# Patient Record
Sex: FEMALE | Race: WHITE | Employment: OTHER | URBAN - METROPOLITAN AREA
[De-identification: names, ages, dates, MRNs, and addresses within clinical notes are randomized per-mention and may not be internally consistent; named-entity substitution may affect disease eponyms.]

---

## 2017-04-21 ENCOUNTER — OP HISTORICAL/CONVERTED ENCOUNTER (OUTPATIENT)
Dept: OTHER | Age: 72
End: 2017-04-21

## 2018-04-27 ENCOUNTER — OP HISTORICAL/CONVERTED ENCOUNTER (OUTPATIENT)
Dept: OTHER | Age: 73
End: 2018-04-27

## 2019-01-04 LAB — AMB DEXA, EXTERNAL: NORMAL

## 2019-05-03 ENCOUNTER — OP HISTORICAL/CONVERTED ENCOUNTER (OUTPATIENT)
Dept: OTHER | Age: 74
End: 2019-05-03

## 2019-10-04 LAB
CREATININE, EXTERNAL: 0.67
HBA1C MFR BLD HPLC: NORMAL %
LDL-C, EXTERNAL: 111

## 2020-05-13 ENCOUNTER — OP HISTORICAL/CONVERTED ENCOUNTER (OUTPATIENT)
Dept: OTHER | Age: 75
End: 2020-05-13

## 2020-05-13 LAB — MAMMOGRAPHY, EXTERNAL: NORMAL

## 2020-10-04 PROBLEM — M85.80 OSTEOPENIA: Status: ACTIVE | Noted: 2020-10-04

## 2020-10-04 PROBLEM — E78.2 MIXED HYPERLIPIDEMIA: Status: ACTIVE | Noted: 2020-10-04

## 2020-10-04 PROBLEM — M19.90 ARTHRITIS: Status: ACTIVE | Noted: 2020-10-04

## 2020-10-04 PROBLEM — M11.20 CHONDROCALCINOSIS DUE TO DICALCIUM PHOSPHATE CRYSTALS: Status: ACTIVE | Noted: 2020-10-04

## 2020-10-04 RX ORDER — AMLODIPINE BESYLATE AND BENAZEPRIL HYDROCHLORIDE 10; 40 MG/1; MG/1
1 CAPSULE ORAL DAILY
COMMUNITY

## 2020-10-04 RX ORDER — NYSTATIN 100000 [USP'U]/G
POWDER TOPICAL 4 TIMES DAILY
COMMUNITY
End: 2022-07-07 | Stop reason: ALTCHOICE

## 2020-10-04 RX ORDER — METOPROLOL SUCCINATE 100 MG/1
100 TABLET, EXTENDED RELEASE ORAL DAILY
COMMUNITY

## 2020-10-04 RX ORDER — PRAVASTATIN SODIUM 20 MG/1
20 TABLET ORAL
COMMUNITY

## 2020-10-04 RX ORDER — VALACYCLOVIR HYDROCHLORIDE 1 G/1
TABLET, FILM COATED ORAL
COMMUNITY
End: 2021-05-13 | Stop reason: ALTCHOICE

## 2020-10-04 RX ORDER — DICLOFENAC SODIUM 75 MG/1
TABLET, DELAYED RELEASE ORAL
COMMUNITY
End: 2020-10-16

## 2020-10-04 RX ORDER — PERPHENAZINE/AMITRIPTYLINE HCL 2 MG-10 MG
TABLET ORAL
COMMUNITY

## 2020-10-16 RX ORDER — DICLOFENAC SODIUM 75 MG/1
TABLET, DELAYED RELEASE ORAL
Qty: 180 TAB | Refills: 0 | Status: SHIPPED | OUTPATIENT
Start: 2020-10-16 | End: 2021-01-11

## 2020-11-02 VITALS
BODY MASS INDEX: 23.56 KG/M2 | HEART RATE: 66 BPM | HEIGHT: 64 IN | OXYGEN SATURATION: 97 % | SYSTOLIC BLOOD PRESSURE: 180 MMHG | TEMPERATURE: 98.5 F | DIASTOLIC BLOOD PRESSURE: 80 MMHG | WEIGHT: 138 LBS | RESPIRATION RATE: 12 BRPM

## 2020-11-09 ENCOUNTER — OFFICE VISIT (OUTPATIENT)
Dept: FAMILY MEDICINE CLINIC | Age: 75
End: 2020-11-09
Payer: MEDICARE

## 2020-11-09 VITALS
TEMPERATURE: 97.5 F | DIASTOLIC BLOOD PRESSURE: 70 MMHG | SYSTOLIC BLOOD PRESSURE: 120 MMHG | HEART RATE: 65 BPM | OXYGEN SATURATION: 98 % | WEIGHT: 137 LBS | HEIGHT: 64 IN | BODY MASS INDEX: 23.39 KG/M2

## 2020-11-09 DIAGNOSIS — I10 ESSENTIAL HYPERTENSION, BENIGN: Chronic | ICD-10-CM

## 2020-11-09 DIAGNOSIS — B35.1 TOENAIL FUNGUS: ICD-10-CM

## 2020-11-09 DIAGNOSIS — R73.01 IMPAIRED FASTING GLUCOSE: Primary | ICD-10-CM

## 2020-11-09 DIAGNOSIS — M85.80 OSTEOPENIA, UNSPECIFIED LOCATION: ICD-10-CM

## 2020-11-09 DIAGNOSIS — E78.2 MIXED HYPERLIPIDEMIA: ICD-10-CM

## 2020-11-09 DIAGNOSIS — Z86.19 HISTORY OF HERPES ZOSTER: ICD-10-CM

## 2020-11-09 PROBLEM — R01.1 HEART MURMUR: Status: ACTIVE | Noted: 2020-11-09

## 2020-11-09 PROBLEM — L24.9 IRRITANT DERMATITIS: Status: ACTIVE | Noted: 2020-11-09

## 2020-11-09 PROBLEM — L82.1 SEBORRHEIC KERATOSES: Status: ACTIVE | Noted: 2020-11-09

## 2020-11-09 PROCEDURE — G8420 CALC BMI NORM PARAMETERS: HCPCS | Performed by: NURSE PRACTITIONER

## 2020-11-09 PROCEDURE — 1090F PRES/ABSN URINE INCON ASSESS: CPT | Performed by: NURSE PRACTITIONER

## 2020-11-09 PROCEDURE — G0439 PPPS, SUBSEQ VISIT: HCPCS | Performed by: NURSE PRACTITIONER

## 2020-11-09 PROCEDURE — G8752 SYS BP LESS 140: HCPCS | Performed by: NURSE PRACTITIONER

## 2020-11-09 PROCEDURE — G8536 NO DOC ELDER MAL SCRN: HCPCS | Performed by: NURSE PRACTITIONER

## 2020-11-09 PROCEDURE — 99213 OFFICE O/P EST LOW 20 MIN: CPT | Performed by: NURSE PRACTITIONER

## 2020-11-09 PROCEDURE — G8427 DOCREV CUR MEDS BY ELIG CLIN: HCPCS | Performed by: NURSE PRACTITIONER

## 2020-11-09 PROCEDURE — G8399 PT W/DXA RESULTS DOCUMENT: HCPCS | Performed by: NURSE PRACTITIONER

## 2020-11-09 PROCEDURE — G8754 DIAS BP LESS 90: HCPCS | Performed by: NURSE PRACTITIONER

## 2020-11-09 PROCEDURE — G8432 DEP SCR NOT DOC, RNG: HCPCS | Performed by: NURSE PRACTITIONER

## 2020-11-09 PROCEDURE — 1101F PT FALLS ASSESS-DOCD LE1/YR: CPT | Performed by: NURSE PRACTITIONER

## 2020-11-09 RX ORDER — SPIRONOLACTONE 25 MG/1
25 TABLET ORAL DAILY
COMMUNITY

## 2020-11-09 RX ORDER — CICLOPIROX 7.7 MG/G
GEL TOPICAL 2 TIMES DAILY
Qty: 30 G | Refills: 3 | Status: SHIPPED | OUTPATIENT
Start: 2020-11-09 | End: 2022-01-06 | Stop reason: ALTCHOICE

## 2020-11-09 RX ORDER — ZOSTER VACCINE RECOMBINANT, ADJUVANTED 50 MCG/0.5
0.5 KIT INTRAMUSCULAR ONCE
Qty: 0.5 ML | Refills: 0 | Status: SHIPPED | OUTPATIENT
Start: 2020-11-09 | End: 2020-11-09

## 2020-11-09 NOTE — PROGRESS NOTES
Medicare Wellness Exam:    Chief Complaint   Patient presents with    Annual Wellness Visit     Subsequent      she is a 67y.o. year old female who presents for evaluation for their Medicare Wellness Visit. Patient does not monitor her blood pressure at home but is seen annually by a cardiologist (Dr. Lis Mcneil) and is also followed by dermatology (Dr. Gavin Seth for her herpese zoster episode, history of epidermal cyst, irritant dermatitis of the back, seborrheic keratosis of the back and skin cancer screening). Last tdap was  2012 and she has had both pneumonia vaccines. It has been recommended to her by her dermatologist that she get the shingrix and I am sending an order to her pharmacy. She has had a total hysterectomy with oopherectomy and her last colonoscopy was in 2017 and was WNL. Actually at age 67 she has aged out of pap and colonoscopy screenings. Patient got her flu vaccine [id number] at her local pharmacy. She is also followed by Dr. Huang Phillips (opthamology ) for cataracts. She does have a current complaint of toenail fungus of left great toe    Fall Screen is completed and assessed=yes  Depression Screen is completed and assessed=yes  Medication list reviewed and adjusted for accuracy=yes  Immunizations reviewed and updated=yes  Health/Preventative Screenings reviewed and updated=yes  ADL Functions reviewed=yes  See scanned medicare wellness documents for full details.      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 ROS   ROS per HPI and patient's active problem list    Objective:     Vitals:    11/09/20 0856   BP: 120/70   Pulse: 65   Temp: 97.5 °F (36.4 °C)   TempSrc: Temporal   SpO2: 98%   Weight: 137 lb (62.1 kg)   Height: 5' 4\" (1.626 m)     Physical Exam  Vitals signs reviewed. HENT:      Head: Normocephalic. Neck:      Musculoskeletal: Normal range of motion and neck supple. Cardiovascular:      Rate and Rhythm: Normal rate and regular rhythm. Heart sounds: Normal heart sounds. Pulmonary:      Effort: Pulmonary effort is normal.      Breath sounds: Normal breath sounds. Comments: Non-radiating murmur  Abdominal:      General: Bowel sounds are normal.      Palpations: Abdomen is soft. Musculoskeletal: Normal range of motion. Skin:     General: Skin is warm and dry. Neurological:      Mental Status: She is oriented to person, place, and time. Psychiatric:         Mood and Affect: Mood normal.         Behavior: Behavior normal.         Thought Content: Thought content normal.         Judgment: Judgment normal.          Assessment/ Plan:   Diagnoses and all orders for this visit:    1. Impaired fasting glucose  -     HEMOGLOBIN A1C WITH EAG    2. Osteopenia, unspecified location    3. Mixed hyperlipidemia  -     CBC WITH AUTOMATED DIFF  -     LIPID PANEL  -     METABOLIC PANEL, COMPREHENSIVE    4. Essential hypertension, benign    5. History of herpes zoster    6. Toenail fungus    Other orders  -     varicella-zoster recombinant, PF, (Shingrix, PF,) 50 mcg/0.5 mL susr injection; 0.5 mL by IntraMUSCular route once for 1 dose. -     ciclopirox (LOPROX) 0.77 % topical gel; Apply  to affected area two (2) times a day.          -Pain evaluation performed in office  -Cognitive Screen performed in office  -Depression Screen, Fall risks (by up and go test)  and ADL functionality were addressed  -Medication list updated and reviewed for any changes   -A comprehensive review of medical issues and a plan was formulated  -End of life planning was addressed with pt   -Health Screenings for preventions were addressed and a plan was formulated  -Shingles Vaccine was recommended  -Discussed with patient cancer risk factors and appropriate screenings for age  -Patient evaluated for colonoscopy and referred if needed per screeing criteria  -Labs from previous visits were discussed with patient   -Discussed with patient diet and exercise and formulated a plan as needed  -An Advanced care plan was developed with the patient.  -Alcohol screening performed and was negative    -  Follow-up and Dispositions    · Return in about 1 year (around 11/9/2021). I have discussed the diagnosis with the patient and the intended plan as seen in the above orders. The patient understands and agrees with the plan. The patient has received an after-visit summary and questions were answered concerning future plans. Medication Side Effects and Warnings were discussed with patien  Patient Labs were reviewed and or requested  Patient Past Records were reviewed and or requested    There are no Patient Instructions on file for this visit.       Chepe Childs NP

## 2020-11-10 LAB
ALBUMIN SERPL-MCNC: 4.9 G/DL (ref 3.7–4.7)
ALBUMIN/GLOB SERPL: 2 {RATIO} (ref 1.2–2.2)
ALP SERPL-CCNC: 85 IU/L (ref 39–117)
ALT SERPL-CCNC: 22 IU/L (ref 0–32)
AST SERPL-CCNC: 18 IU/L (ref 0–40)
BASOPHILS # BLD AUTO: 0.1 X10E3/UL (ref 0–0.2)
BASOPHILS NFR BLD AUTO: 1 %
BILIRUB SERPL-MCNC: 0.4 MG/DL (ref 0–1.2)
BUN SERPL-MCNC: 21 MG/DL (ref 8–27)
BUN/CREAT SERPL: 25 (ref 12–28)
CALCIUM SERPL-MCNC: 10.1 MG/DL (ref 8.7–10.3)
CHLORIDE SERPL-SCNC: 95 MMOL/L (ref 96–106)
CHOLEST SERPL-MCNC: 188 MG/DL (ref 100–199)
CO2 SERPL-SCNC: 26 MMOL/L (ref 20–29)
CREAT SERPL-MCNC: 0.85 MG/DL (ref 0.57–1)
EOSINOPHIL # BLD AUTO: 0.1 X10E3/UL (ref 0–0.4)
EOSINOPHIL NFR BLD AUTO: 1 %
ERYTHROCYTE [DISTWIDTH] IN BLOOD BY AUTOMATED COUNT: 12.5 % (ref 11.7–15.4)
EST. AVERAGE GLUCOSE BLD GHB EST-MCNC: 126 MG/DL
GLOBULIN SER CALC-MCNC: 2.4 G/DL (ref 1.5–4.5)
GLUCOSE SERPL-MCNC: 105 MG/DL (ref 65–99)
HBA1C MFR BLD: 6 % (ref 4.8–5.6)
HCT VFR BLD AUTO: 36.9 % (ref 34–46.6)
HDLC SERPL-MCNC: 52 MG/DL
HGB BLD-MCNC: 12.3 G/DL (ref 11.1–15.9)
IMM GRANULOCYTES # BLD AUTO: 0 X10E3/UL (ref 0–0.1)
IMM GRANULOCYTES NFR BLD AUTO: 0 %
LDLC SERPL CALC-MCNC: 115 MG/DL (ref 0–99)
LYMPHOCYTES # BLD AUTO: 1.1 X10E3/UL (ref 0.7–3.1)
LYMPHOCYTES NFR BLD AUTO: 20 %
MCH RBC QN AUTO: 30.9 PG (ref 26.6–33)
MCHC RBC AUTO-ENTMCNC: 33.3 G/DL (ref 31.5–35.7)
MCV RBC AUTO: 93 FL (ref 79–97)
MONOCYTES # BLD AUTO: 0.6 X10E3/UL (ref 0.1–0.9)
MONOCYTES NFR BLD AUTO: 10 %
NEUTROPHILS # BLD AUTO: 3.8 X10E3/UL (ref 1.4–7)
NEUTROPHILS NFR BLD AUTO: 68 %
PLATELET # BLD AUTO: 218 X10E3/UL (ref 150–450)
POTASSIUM SERPL-SCNC: 4.5 MMOL/L (ref 3.5–5.2)
PROT SERPL-MCNC: 7.3 G/DL (ref 6–8.5)
RBC # BLD AUTO: 3.98 X10E6/UL (ref 3.77–5.28)
SODIUM SERPL-SCNC: 131 MMOL/L (ref 134–144)
TRIGL SERPL-MCNC: 119 MG/DL (ref 0–149)
VLDLC SERPL CALC-MCNC: 21 MG/DL (ref 5–40)
WBC # BLD AUTO: 5.6 X10E3/UL (ref 3.4–10.8)

## 2020-11-11 ENCOUNTER — TELEPHONE (OUTPATIENT)
Dept: FAMILY MEDICINE CLINIC | Age: 75
End: 2020-11-11

## 2020-11-11 NOTE — TELEPHONE ENCOUNTER
[facility] called to see where to put the gel for her tonail fungus: Left great toenail

## 2020-11-12 ENCOUNTER — TELEPHONE (OUTPATIENT)
Dept: FAMILY MEDICINE CLINIC | Age: 75
End: 2020-11-12

## 2020-11-13 NOTE — PROGRESS NOTES
In October of 2019 your A1C was WNL at 5.6. It is now in the prediabetic level at 6.0. I think that we can still handle it with lifestyle changes and recheck in 6 months. Those lifestyle changes would be:  Patient instructed to adhere to portion sizes Avoid the intake of concentrated sweets such as cakes, pies and cookies, Avoid intake of soft drinks (even diet soft drinks)  Increase intake of water, Increase intake of fiber, Lose weight and exercise at least 30 minutes most days of the week. Your bad cholesterol is also mildly elevated but relatively stable. It was 111 at your last set of labs and 115. Now. That is a minimal increase so we will monitor it for now. Continue taking your pravastatin and hopefully you are taking that in the evening. Your other labs are basically normal.  Some values may be minimally outside the \"normal\" range but are not harmful or clinically significant.   Please contact the office if you have questions or concerns

## 2020-11-16 ENCOUNTER — TELEPHONE (OUTPATIENT)
Dept: FAMILY MEDICINE CLINIC | Age: 75
End: 2020-11-16

## 2021-01-11 RX ORDER — DICLOFENAC SODIUM 75 MG/1
TABLET, DELAYED RELEASE ORAL
Qty: 180 TAB | Refills: 1 | Status: SHIPPED | OUTPATIENT
Start: 2021-01-11 | End: 2021-07-07

## 2021-04-20 ENCOUNTER — TRANSCRIBE ORDER (OUTPATIENT)
Dept: SCHEDULING | Age: 76
End: 2021-04-20

## 2021-04-20 DIAGNOSIS — Z12.31 VISIT FOR SCREENING MAMMOGRAM: Primary | ICD-10-CM

## 2021-05-13 ENCOUNTER — OFFICE VISIT (OUTPATIENT)
Dept: FAMILY MEDICINE CLINIC | Age: 76
End: 2021-05-13
Payer: MEDICARE

## 2021-05-13 VITALS
WEIGHT: 136.8 LBS | TEMPERATURE: 97.2 F | DIASTOLIC BLOOD PRESSURE: 78 MMHG | HEART RATE: 65 BPM | RESPIRATION RATE: 18 BRPM | OXYGEN SATURATION: 98 % | BODY MASS INDEX: 23.35 KG/M2 | SYSTOLIC BLOOD PRESSURE: 140 MMHG | HEIGHT: 64 IN

## 2021-05-13 DIAGNOSIS — Z11.59 ENCOUNTER FOR HEPATITIS C SCREENING TEST FOR LOW RISK PATIENT: ICD-10-CM

## 2021-05-13 DIAGNOSIS — M19.90 ARTHRITIS: ICD-10-CM

## 2021-05-13 DIAGNOSIS — E78.2 MIXED HYPERLIPIDEMIA: ICD-10-CM

## 2021-05-13 DIAGNOSIS — M85.80 OSTEOPENIA, UNSPECIFIED LOCATION: ICD-10-CM

## 2021-05-13 DIAGNOSIS — D62 ACUTE POSTHEMORRHAGIC ANEMIA: ICD-10-CM

## 2021-05-13 DIAGNOSIS — R01.1 HEART MURMUR: ICD-10-CM

## 2021-05-13 DIAGNOSIS — E55.9 VITAMIN D DEFICIENCY: ICD-10-CM

## 2021-05-13 DIAGNOSIS — Z76.0 MEDICATION REFILL: ICD-10-CM

## 2021-05-13 DIAGNOSIS — B35.1 TOENAIL FUNGUS: ICD-10-CM

## 2021-05-13 DIAGNOSIS — M11.20: ICD-10-CM

## 2021-05-13 DIAGNOSIS — R73.01 IMPAIRED FASTING GLUCOSE: Primary | ICD-10-CM

## 2021-05-13 DIAGNOSIS — I10 ESSENTIAL HYPERTENSION, BENIGN: Chronic | ICD-10-CM

## 2021-05-13 PROCEDURE — G8753 SYS BP > OR = 140: HCPCS | Performed by: NURSE PRACTITIONER

## 2021-05-13 PROCEDURE — 99214 OFFICE O/P EST MOD 30 MIN: CPT | Performed by: NURSE PRACTITIONER

## 2021-05-13 PROCEDURE — G8536 NO DOC ELDER MAL SCRN: HCPCS | Performed by: NURSE PRACTITIONER

## 2021-05-13 PROCEDURE — G8399 PT W/DXA RESULTS DOCUMENT: HCPCS | Performed by: NURSE PRACTITIONER

## 2021-05-13 PROCEDURE — 1101F PT FALLS ASSESS-DOCD LE1/YR: CPT | Performed by: NURSE PRACTITIONER

## 2021-05-13 PROCEDURE — G8420 CALC BMI NORM PARAMETERS: HCPCS | Performed by: NURSE PRACTITIONER

## 2021-05-13 PROCEDURE — G8432 DEP SCR NOT DOC, RNG: HCPCS | Performed by: NURSE PRACTITIONER

## 2021-05-13 PROCEDURE — 1090F PRES/ABSN URINE INCON ASSESS: CPT | Performed by: NURSE PRACTITIONER

## 2021-05-13 PROCEDURE — G8427 DOCREV CUR MEDS BY ELIG CLIN: HCPCS | Performed by: NURSE PRACTITIONER

## 2021-05-13 PROCEDURE — G8754 DIAS BP LESS 90: HCPCS | Performed by: NURSE PRACTITIONER

## 2021-05-13 RX ORDER — DICLOFENAC SODIUM 10 MG/G
2 GEL TOPICAL 4 TIMES DAILY
Qty: 20 G | Refills: 2 | Status: SHIPPED | OUTPATIENT
Start: 2021-05-13 | End: 2022-07-07 | Stop reason: SDUPTHER

## 2021-05-13 RX ORDER — PREDNISOLONE ACETATE 10 MG/ML
SUSPENSION/ DROPS OPHTHALMIC
COMMUNITY
Start: 2021-03-16

## 2021-05-13 RX ORDER — CEPHALEXIN 500 MG/1
CAPSULE ORAL
COMMUNITY
Start: 2021-04-06 | End: 2021-05-13

## 2021-05-13 NOTE — PROGRESS NOTES
Subjective  Chief Complaint   Patient presents with    Follow-up     cataracts surgery, shingles    Labs     fasting, A1c     HPI:  Criss Fuentes is a 67 y.o. female. 68 yo female presents for f/u of her chronic conditions. She has gotten the first of the shingrix vaccine and has a mammogram scheduled for tomorrow. She had her eyes checked last Thursday and has a f/u in June. She is s/p successful cataract surgery in April and February of this year. She is also followed by cardiology( Dr Terrell Anton) every 6 months and he manages her blood pressure meds.   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 10/4/2020    Eye problems     Gout     Hx of colonoscopy approx 2000    Hypertension     Mixed hyperlipidemia 10/4/2020    Nausea & vomiting     Osteopenia 10/4/2020    Urinary tract infection, site not specified 7/2012     Family History   Problem Relation Age of Onset    Cancer Father     Cancer Sister         breast, ovary    Delayed Awakening Other     Malignant Hyperthermia Neg Hx     Pseudocholinesterase Deficiency Neg Hx     Post-op Nausea/Vomiting Neg Hx     Emergence Delirium Neg Hx     Post-op Cognitive Dysfunction Neg Hx      Social History     Socioeconomic History    Marital status:      Spouse name: Not on file    Number of children: Not on file    Years of education: Not on file    Highest education level: Not on file   Occupational History    Not on file   Social Needs    Financial resource strain: Not on file    Food insecurity     Worry: Not on file     Inability: Not on file    Transportation needs     Medical: Not on file     Non-medical: Not on file   Tobacco Use    Smoking status: Never Smoker    Smokeless tobacco: Never Used   Substance and Sexual Activity    Alcohol use: No    Drug use: No    Sexual activity: Not on file   Lifestyle    Physical activity     Days per week: Not on file     Minutes per session: Not on file    Stress: Not on file   Relationships    Social connections     Talks on phone: Not on file     Gets together: Not on file     Attends Restorationist service: Not on file     Active member of club or organization: Not on file     Attends meetings of clubs or organizations: Not on file     Relationship status: Not on file    Intimate partner violence     Fear of current or ex partner: Not on file     Emotionally abused: Not on file     Physically abused: Not on file     Forced sexual activity: Not on file   Other Topics Concern    Not on file   Social History Narrative    Not on file     Current Outpatient Medications on File Prior to Visit   Medication Sig Dispense Refill    prednisoLONE acetate (PRED FORTE) 1 % ophthalmic suspension       diclofenac EC (VOLTAREN) 75 mg EC tablet Take 1 tablet by mouth twice daily as needed for pain 180 Tab 1    spironolactone (ALDACTONE) 25 mg tablet Take 25 mg by mouth daily.  vit C/vit E acet/lutein/min (LUTEIN VISON FORMULA PO) Take  by mouth.  ciclopirox (LOPROX) 0.77 % topical gel Apply  to affected area two (2) times a day. 30 g 3    amLODIPine-benazepril (LOTREL) 10-40 mg per capsule Take 1 Cap by mouth daily.  astaxanthin 4 mg cap Take  by mouth.  calcium crb,cit/D3/min34/frances (CITRACAL PLUS BONE DENSITY PO) Take  by mouth.  calcium phosphate trib/vit D3 (CITRACAL-D3 GUMMIES PO) Take  by mouth.  metoprolol succinate (TOPROL-XL) 100 mg tablet Take 100 mg by mouth daily.  pravastatin (PRAVACHOL) 20 mg tablet Take 20 mg by mouth nightly.  TURMERIC PO Take  by mouth. 500mg po bid      furosemide (LASIX) 40 mg tablet Take 40 mg by mouth daily.       [DISCONTINUED] cephALEXin (KEFLEX) 500 mg capsule       nystatin (Nyamyc) powder Apply  to affected area four (4) times daily.  [DISCONTINUED] valACYclovir (VALTREX) 1 gram tablet Take  by mouth.  rivaroxaban (XARELTO) 10 mg tablet Take 1 Tab by mouth every twenty-four (24) hours. 30 Tab 1    HYDROmorphone (DILAUDID) 2 mg tablet Take 1 Tab by mouth every four (4) hours as needed for Pain. 60 Tab 0    conjugated estrogens (PREMARIN) 0.625 mg tablet Take 0.625 mg by mouth.  losartan (COZAAR) 100 mg tablet Take 100 mg by mouth daily.  amLODIPine (NORVASC) 5 mg tablet Take 5 mg by mouth daily.  citalopram (CELEXA) 20 mg tablet Take 10 mg by mouth daily. No current facility-administered medications on file prior to visit. Allergies   Allergen Reactions    Codeine Other (comments)    Darvocet A500 [Propoxyphene N-Acetaminophen] Not Reported This Time    Hydrochlorothiazide Rash    Morphine Not Reported This Time     ROS   ROS per HPI and PMH      Objective  Physical Exam  Vitals signs reviewed. Cardiovascular:      Rate and Rhythm: Normal rate and regular rhythm. Heart sounds: Normal heart sounds. Pulmonary:      Effort: Pulmonary effort is normal.      Breath sounds: Normal breath sounds. Neurological:      Mental Status: She is alert and oriented to person, place, and time. Psychiatric:         Mood and Affect: Mood normal.         Behavior: Behavior normal.         Thought Content: Thought content normal.         Judgment: Judgment normal.          Assessment & Plan      ICD-10-CM ICD-9-CM    1. Impaired fasting glucose  R73.01 790.21 HEMOGLOBIN A1C WITH EAG   2. Essential hypertension, benign  X14 242.3 METABOLIC PANEL, COMPREHENSIVE   3. Osteopenia, unspecified location  M85.80 733.90    4. Mixed hyperlipidemia  E78.2 272.2 LIPID PANEL   5. Acute posthemorrhagic anemia  D62 285.1 CBC WITH AUTOMATED DIFF   6.  Encounter for hepatitis C screening test for low risk patient  Z11.59 V73.89 HEPATITIS C AB   7. Vitamin D deficiency  E55.9 268.9 VITAMIN D, 25 HYDROXY   8. Toenail fungus  B35.1 110.1    9. Arthritis  M19.90 716.90    10. 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 Heart murmur  R01.1 785. 2      Diagnoses and all orders for this visit:    1. Impaired fasting glucose  -     HEMOGLOBIN A1C WITH EAG  Obtaining updated A1C for trending and will make treatment decisions when I get the results    2. Essential hypertension, benign  -     METABOLIC PANEL, COMPREHENSIVE  Her blood pressure on presentation is 140/78 and her blood pressure meds are handled by her cardiologist.  Obtaining updated CMP for trending and will make treatment decisions when I get the results    3. Osteopenia, unspecified location  Patient is taking a vitamin D supplement. Will discuss updated bone density at [facility]    4. Mixed hyperlipidemia  -     LIPID PANEL  Obtaining updated lipid panel for trending and will make treatment decisions when I get the results    5. Acute posthemorrhagic anemia  -     CBC WITH AUTOMATED DIFF  Obtaining updated CBC for trending and will make treatment decisions when I get the results    6. Encounter for hepatitis C screening test for low risk patient  -     HEPATITIS C AB  Obtaining screening for hepatitis C and will confirm positive with an additional lab test and will refer to GI for further evaluation and treatment    7. Vitamin D deficiency  -     VITAMIN D, 25 HYDROXY  Obtaining updated vitamin D for trending and will make supplementation decisions when I get the results    8. Toenail fungus  This condition is much improved with the use of her toe nail fungus gell. I will continue with the use of this medication  9. Arthritis  Patient uses PO diclofenac and diclofenac gel for her discomfort and this works adequately for her. Will continue with the use of these medications    10.  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 Will continue with the use of these medications    11. Heart murmur  Patient is followed by Dr. Chidi Renteria for this conditions    Other orders  -     diclofenac (VOLTAREN) 1 % gel; Apply 2 g to affected area four (4) times daily.   Medication refill as requested by patient      Jami Mathur NP

## 2021-05-13 NOTE — PROGRESS NOTES
Diclofenac 1% gel wants a prescription on the gel uses it mostly on her toes    Chief Complaint   Patient presents with    Follow-up     cataracts surgery, shingles    Labs     fasting, A1c     1. Have you been to the ER, urgent care clinic since your last visit? Hospitalized since your last visit? No    2. Have you seen or consulted any other health care providers outside of the [address] [place] [address] since your last visit? Include any pap smears or colon screening.  Yes Where: [facility] Reason for visit: Cataracts surgery, dentist    3 most recent [facility] Screens 5/13/2021   Little interest or pleasure in doing things Not at all   Feeling down, depressed, irritable, or hopeless Not at all   Total Score PHQ 2 0

## 2021-05-14 ENCOUNTER — HOSPITAL ENCOUNTER (OUTPATIENT)
Dept: MAMMOGRAPHY | Age: 76
Discharge: HOME | End: 2021-05-14
Attending: NURSE PRACTITIONER
Payer: MEDICARE

## 2021-05-14 DIAGNOSIS — Z12.31 VISIT FOR SCREENING MAMMOGRAM: ICD-10-CM

## 2021-05-14 LAB
25(OH)D3+25(OH)D2 SERPL-MCNC: 57.1 NG/ML (ref 30–100)
ALBUMIN SERPL-MCNC: 4.8 G/DL (ref 3.7–4.7)
ALBUMIN/GLOB SERPL: 1.8 {RATIO} (ref 1.2–2.2)
ALP SERPL-CCNC: 79 IU/L (ref 39–117)
ALT SERPL-CCNC: 20 IU/L (ref 0–32)
AST SERPL-CCNC: 17 IU/L (ref 0–40)
BASOPHILS # BLD AUTO: 0.1 X10E3/UL (ref 0–0.2)
BASOPHILS NFR BLD AUTO: 1 %
BILIRUB SERPL-MCNC: 0.4 MG/DL (ref 0–1.2)
BUN SERPL-MCNC: 21 MG/DL (ref 8–27)
BUN/CREAT SERPL: 24 (ref 12–28)
CALCIUM SERPL-MCNC: 10.4 MG/DL (ref 8.7–10.3)
CHLORIDE SERPL-SCNC: 99 MMOL/L (ref 96–106)
CHOLEST SERPL-MCNC: 179 MG/DL (ref 100–199)
CO2 SERPL-SCNC: 26 MMOL/L (ref 20–29)
CREAT SERPL-MCNC: 0.86 MG/DL (ref 0.57–1)
EOSINOPHIL # BLD AUTO: 0.1 X10E3/UL (ref 0–0.4)
EOSINOPHIL NFR BLD AUTO: 1 %
ERYTHROCYTE [DISTWIDTH] IN BLOOD BY AUTOMATED COUNT: 12.4 % (ref 11.7–15.4)
EST. AVERAGE GLUCOSE BLD GHB EST-MCNC: 120 MG/DL
GLOBULIN SER CALC-MCNC: 2.7 G/DL (ref 1.5–4.5)
GLUCOSE SERPL-MCNC: 95 MG/DL (ref 65–99)
HBA1C MFR BLD: 5.8 % (ref 4.8–5.6)
HCT VFR BLD AUTO: 37.6 % (ref 34–46.6)
HCV AB S/CO SERPL IA: <0.1 S/CO RATIO (ref 0–0.9)
HDLC SERPL-MCNC: 56 MG/DL
HGB BLD-MCNC: 12.8 G/DL (ref 11.1–15.9)
IMM GRANULOCYTES # BLD AUTO: 0 X10E3/UL (ref 0–0.1)
IMM GRANULOCYTES NFR BLD AUTO: 0 %
LDLC SERPL CALC-MCNC: 102 MG/DL (ref 0–99)
LYMPHOCYTES # BLD AUTO: 1.1 X10E3/UL (ref 0.7–3.1)
LYMPHOCYTES NFR BLD AUTO: 15 %
MCH RBC QN AUTO: 31.4 PG (ref 26.6–33)
MCHC RBC AUTO-ENTMCNC: 34 G/DL (ref 31.5–35.7)
MCV RBC AUTO: 92 FL (ref 79–97)
MONOCYTES # BLD AUTO: 0.6 X10E3/UL (ref 0.1–0.9)
MONOCYTES NFR BLD AUTO: 8 %
NEUTROPHILS # BLD AUTO: 5.5 X10E3/UL (ref 1.4–7)
NEUTROPHILS NFR BLD AUTO: 75 %
PLATELET # BLD AUTO: 218 X10E3/UL (ref 150–450)
POTASSIUM SERPL-SCNC: 4.5 MMOL/L (ref 3.5–5.2)
PROT SERPL-MCNC: 7.5 G/DL (ref 6–8.5)
RBC # BLD AUTO: 4.07 X10E6/UL (ref 3.77–5.28)
SODIUM SERPL-SCNC: 137 MMOL/L (ref 134–144)
TRIGL SERPL-MCNC: 117 MG/DL (ref 0–149)
VLDLC SERPL CALC-MCNC: 21 MG/DL (ref 5–40)
WBC # BLD AUTO: 7.4 X10E3/UL (ref 3.4–10.8)

## 2021-05-14 PROCEDURE — 77063 BREAST TOMOSYNTHESIS BI: CPT

## 2021-05-14 NOTE — PROGRESS NOTES
Your A1c has decreased a little so that is a good thing. It is almost in the normal range. Your other labs are basically normal.  Some values may be minimally outside the \"normal\" range but are not harmful or clinically significant. Please contact the office if you have questions or concerns.   We will recheck your labs at your next office visit

## 2021-09-16 ENCOUNTER — TELEPHONE (OUTPATIENT)
Dept: FAMILY MEDICINE CLINIC | Age: 76
End: 2021-09-16

## 2021-09-16 DIAGNOSIS — Z23 ENCOUNTER FOR IMMUNIZATION: Primary | ICD-10-CM

## 2021-09-17 ENCOUNTER — TELEPHONE (OUTPATIENT)
Dept: FAMILY MEDICINE CLINIC | Age: 76
End: 2021-09-17

## 2021-09-17 NOTE — TELEPHONE ENCOUNTER
Called and informed that per TSS it would be best if she got at least one MMR vaccine. Pt stated that when she comes for her appt in Dec she will get it then.

## 2021-09-17 NOTE — TELEPHONE ENCOUNTER
Pt called back concerning MMR vaccine. Can call back at either [phone]:Home or [phone]:Cell. She may not be home all day so she said the best phone to reach her on is her cell phone. Thanks.

## 2022-01-06 ENCOUNTER — OFFICE VISIT (OUTPATIENT)
Dept: FAMILY MEDICINE CLINIC | Age: 77
End: 2022-01-06
Payer: MEDICARE

## 2022-01-06 VITALS
TEMPERATURE: 97.1 F | RESPIRATION RATE: 16 BRPM | SYSTOLIC BLOOD PRESSURE: 130 MMHG | BODY MASS INDEX: 23.52 KG/M2 | OXYGEN SATURATION: 96 % | WEIGHT: 137.8 LBS | DIASTOLIC BLOOD PRESSURE: 68 MMHG | HEIGHT: 64 IN | HEART RATE: 64 BPM

## 2022-01-06 DIAGNOSIS — I10 ESSENTIAL HYPERTENSION, BENIGN: Primary | ICD-10-CM

## 2022-01-06 DIAGNOSIS — M19.90 ARTHRITIS: ICD-10-CM

## 2022-01-06 DIAGNOSIS — R73.01 IMPAIRED FASTING GLUCOSE: ICD-10-CM

## 2022-01-06 DIAGNOSIS — Z00.00 ENCOUNTER FOR MEDICARE ANNUAL WELLNESS EXAM: ICD-10-CM

## 2022-01-06 DIAGNOSIS — R76.0 ABNORMAL ANTIBODY TITER: ICD-10-CM

## 2022-01-06 DIAGNOSIS — E55.9 VITAMIN D DEFICIENCY: ICD-10-CM

## 2022-01-06 DIAGNOSIS — Z13.29 SCREENING FOR THYROID DISORDER: ICD-10-CM

## 2022-01-06 DIAGNOSIS — E78.2 MIXED HYPERLIPIDEMIA: ICD-10-CM

## 2022-01-06 PROCEDURE — G8420 CALC BMI NORM PARAMETERS: HCPCS | Performed by: NURSE PRACTITIONER

## 2022-01-06 PROCEDURE — 1090F PRES/ABSN URINE INCON ASSESS: CPT | Performed by: NURSE PRACTITIONER

## 2022-01-06 PROCEDURE — G8536 NO DOC ELDER MAL SCRN: HCPCS | Performed by: NURSE PRACTITIONER

## 2022-01-06 PROCEDURE — G8427 DOCREV CUR MEDS BY ELIG CLIN: HCPCS | Performed by: NURSE PRACTITIONER

## 2022-01-06 PROCEDURE — G0439 PPPS, SUBSEQ VISIT: HCPCS | Performed by: NURSE PRACTITIONER

## 2022-01-06 PROCEDURE — G8754 DIAS BP LESS 90: HCPCS | Performed by: NURSE PRACTITIONER

## 2022-01-06 PROCEDURE — G8752 SYS BP LESS 140: HCPCS | Performed by: NURSE PRACTITIONER

## 2022-01-06 PROCEDURE — 1101F PT FALLS ASSESS-DOCD LE1/YR: CPT | Performed by: NURSE PRACTITIONER

## 2022-01-06 PROCEDURE — 99213 OFFICE O/P EST LOW 20 MIN: CPT | Performed by: NURSE PRACTITIONER

## 2022-01-06 PROCEDURE — G8399 PT W/DXA RESULTS DOCUMENT: HCPCS | Performed by: NURSE PRACTITIONER

## 2022-01-06 PROCEDURE — G8432 DEP SCR NOT DOC, RNG: HCPCS | Performed by: NURSE PRACTITIONER

## 2022-01-06 RX ORDER — CEPHALEXIN 500 MG/1
CAPSULE ORAL
COMMUNITY
Start: 2021-12-09

## 2022-01-06 RX ORDER — DICLOFENAC SODIUM 75 MG/1
TABLET, DELAYED RELEASE ORAL
Qty: 180 TABLET | Refills: 1 | Status: SHIPPED | OUTPATIENT
Start: 2022-01-06 | End: 2022-07-07

## 2022-01-06 RX ORDER — TRETINOIN 0.25 MG/G
CREAM TOPICAL
COMMUNITY
Start: 2021-12-09

## 2022-01-06 RX ORDER — GABAPENTIN 100 MG/1
CAPSULE ORAL
COMMUNITY
End: 2022-07-07 | Stop reason: ALTCHOICE

## 2022-01-06 NOTE — PROGRESS NOTES
Medicare Wellness Exam:    Chief Complaint   Patient presents with    Annual Wellness Visit     Medicare   [facility]     she is a 67y.o. year old female who presents for evaluation for their Medicare Wellness Visit. Of concern is that she thinks that she has not had an MMR. She has had the measles and mumps and her son advised her to get an MMR so we will check a titer. He told her that there are cases among the [place]. She is followed by ortho for her hip/sciatica. She is also followed by Dr. Ronaldo Brooks (cardiology). She is otherwise without complaints    Fall Screen is completed and assessed=yes  Depression Screen is completed and assessed=yes  Medication list reviewed and adjusted for accuracy=yes  Immunizations reviewed and updated=yes  Health/Preventative Screenings reviewed and updated=yes  ADL Functions reviewed=yes  See scanned medicare wellness documents for full details. 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 ROS   ROS per HPI and patient's active problem list    Objective:     Vitals:    01/06/22 0935   BP: 130/68   Pulse: 64   Resp: 16   Temp: 97.1 °F (36.2 °C)   TempSrc: Temporal   SpO2: 96%   Weight: 137 lb 12.8 oz (62.5 kg)   Height: 5' 4\" (1.626 m)     Physical Exam  Vitals reviewed.    Cardiovascular:      Rate and Rhythm: Normal rate and regular rhythm. Heart sounds: Normal heart sounds. Pulmonary:      Effort: Pulmonary effort is normal.      Breath sounds: Normal breath sounds. Neurological:      Mental Status: She is alert and oriented to person, place, and time. Psychiatric:         Mood and Affect: Mood normal.         Behavior: Behavior normal.         Thought Content: Thought content normal.         Judgment: Judgment normal.          Assessment/ Plan:   Diagnoses and all orders for this visit:    1. Essential hypertension, benign  -     CBC WITH AUTOMATED DIFF  -     METABOLIC PANEL, COMPREHENSIVE  Obtaining updated CBC and CMP for trending and will make treatment decisions when I get the results    2. Impaired fasting glucose  -     HEMOGLOBIN A1C WITH EAG  Obtaining updated A1C for trending and will make treatment decisions when I get the results    3. Mixed hyperlipidemia  -     LIPID PANEL  Obtaining baseline lipid for trending and will make treatment decisions when I get the resujlts    4. Vitamin D deficiency  -     VITAMIN D, 25 HYDROXY  Obtaining updated vitamin D for trending and will make supplementation decisions when I get the results    5. Screening for thyroid disorder  -     TSH 3RD GENERATION  -     T4, FREE  Obtaining baseline TSH and T4 and will make treatment decisions when I get the results    6. Abnormal antibody titer  -     MEASLES/MUMPS/RUBELLA IMMUNITY  Obtaining MMR to ascertain status and will make treatment decisions when I get the results    7. Arthritis  Patient is followed by ortho for this condition and uses diclofenac gel for discomfort    8.  Encounter for Medicare annual wellness   exam  We are making sure that her health screenings are done in a timely fashion and they are as documented in the EMR         -Pain evaluation performed in office  -Cognitive Screen performed in office  -Depression Screen, Fall risks (by up and go test)  and ADL functionality were addressed  -Medication list updated and reviewed for any changes   -A comprehensive review of medical issues and a plan was formulated  -End of life planning was addressed with pt   -Health Screenings for preventions were addressed and a plan was formulated  -Shingles Vaccine was recommended  -Discussed with patient cancer risk factors and appropriate screenings for age  -Patient evaluated for colonoscopy and referred if needed per screeing criteria  -Labs from previous visits were discussed with patient   -Discussed with patient diet and exercise and formulated a plan as needed  -An Advanced care plan was developed with the patient.  -Alcohol screening performed and was negative    -  Follow-up and Dispositions    · Return in about 6 months (around 7/6/2022) for f/u of chronic conditions with fasting labs. I have discussed the diagnosis with the patient and the intended plan as seen in the above orders. The patient understands and agrees with the plan. The patient has received an after-visit summary and questions were answered concerning future plans. Medication Side Effects and Warnings were discussed with patien  Patient Labs were reviewed and or requested  Patient Past Records were reviewed and or requested    There are no Patient Instructions on file for this visit.       Crystal Garnica NP

## 2022-01-06 NOTE — PROGRESS NOTES
Chief Complaint   Patient presents with   [facility] Annual Wellness Visit     Medicare    Labs     1. Have you been to the ER, urgent care clinic since your last visit? Hospitalized since your last visit? No    2. Have you seen or consulted any other health care providers outside of the [address] [place] [address] since your last visit? Include any pap smears or colon screening. No  3 most recent PHQ Screens 1/6/2022   Little interest or pleasure in doing things Not at all   Feeling down, depressed, irritable, or hopeless Not at all   Total Score PHQ 2 0     Fall Risk Assessment, last 12 mths 1/6/2022   Able to walk? Yes   Fall in past 12 months? 0   Do you feel unsteady?  0   Are you worried about falling 0               Visit Vitals  /68 (BP 1 Location: Left upper arm, BP Patient Position: Sitting, BP Cuff Size: Adult)   Pulse 64   Temp 97.1 °F (36.2 °C) (Temporal)   Resp 16   Ht 5' 4\" (1.626 m)   Wt 137 lb 12.8 oz (62.5 kg)   SpO2 96%   BMI 23.65 kg/m²

## 2022-01-08 LAB
25(OH)D3+25(OH)D2 SERPL-MCNC: 50.6 NG/ML (ref 30–100)
ALBUMIN SERPL-MCNC: 4.7 G/DL (ref 3.7–4.7)
ALBUMIN/GLOB SERPL: 1.8 {RATIO} (ref 1.2–2.2)
ALP SERPL-CCNC: 86 IU/L (ref 44–121)
ALT SERPL-CCNC: 21 IU/L (ref 0–32)
AST SERPL-CCNC: 17 IU/L (ref 0–40)
BASOPHILS # BLD AUTO: 0.1 X10E3/UL (ref 0–0.2)
BASOPHILS NFR BLD AUTO: 1 %
BILIRUB SERPL-MCNC: 0.3 MG/DL (ref 0–1.2)
BUN SERPL-MCNC: 18 MG/DL (ref 8–27)
BUN/CREAT SERPL: 21 (ref 12–28)
CALCIUM SERPL-MCNC: 10 MG/DL (ref 8.7–10.3)
CHLORIDE SERPL-SCNC: 97 MMOL/L (ref 96–106)
CHOLEST SERPL-MCNC: 162 MG/DL (ref 100–199)
CO2 SERPL-SCNC: 23 MMOL/L (ref 20–29)
CREAT SERPL-MCNC: 0.86 MG/DL (ref 0.57–1)
EOSINOPHIL # BLD AUTO: 0.1 X10E3/UL (ref 0–0.4)
EOSINOPHIL NFR BLD AUTO: 1 %
ERYTHROCYTE [DISTWIDTH] IN BLOOD BY AUTOMATED COUNT: 12.2 % (ref 11.7–15.4)
EST. AVERAGE GLUCOSE BLD GHB EST-MCNC: 123 MG/DL
GLOBULIN SER CALC-MCNC: 2.6 G/DL (ref 1.5–4.5)
GLUCOSE SERPL-MCNC: 101 MG/DL (ref 65–99)
HBA1C MFR BLD: 5.9 % (ref 4.8–5.6)
HCT VFR BLD AUTO: 36.3 % (ref 34–46.6)
HDLC SERPL-MCNC: 53 MG/DL
HGB BLD-MCNC: 12.3 G/DL (ref 11.1–15.9)
IMM GRANULOCYTES # BLD AUTO: 0 X10E3/UL (ref 0–0.1)
IMM GRANULOCYTES NFR BLD AUTO: 1 %
LDLC SERPL CALC-MCNC: 90 MG/DL (ref 0–99)
LYMPHOCYTES # BLD AUTO: 1.2 X10E3/UL (ref 0.7–3.1)
LYMPHOCYTES NFR BLD AUTO: 19 %
MCH RBC QN AUTO: 31.5 PG (ref 26.6–33)
MCHC RBC AUTO-ENTMCNC: 33.9 G/DL (ref 31.5–35.7)
MCV RBC AUTO: 93 FL (ref 79–97)
MEV IGG SER IA-ACNC: >300 AU/ML
MONOCYTES # BLD AUTO: 0.6 X10E3/UL (ref 0.1–0.9)
MONOCYTES NFR BLD AUTO: 9 %
MUV IGG SER IA-ACNC: 289 AU/ML
NEUTROPHILS # BLD AUTO: 4.6 X10E3/UL (ref 1.4–7)
NEUTROPHILS NFR BLD AUTO: 69 %
PLATELET # BLD AUTO: 223 X10E3/UL (ref 150–450)
POTASSIUM SERPL-SCNC: 4.1 MMOL/L (ref 3.5–5.2)
PROT SERPL-MCNC: 7.3 G/DL (ref 6–8.5)
RBC # BLD AUTO: 3.9 X10E6/UL (ref 3.77–5.28)
RUBV IGG SERPL IA-ACNC: 9.99 INDEX
SODIUM SERPL-SCNC: 133 MMOL/L (ref 134–144)
T4 FREE SERPL-MCNC: 1.69 NG/DL (ref 0.82–1.77)
TRIGL SERPL-MCNC: 104 MG/DL (ref 0–149)
TSH SERPL DL<=0.005 MIU/L-ACNC: 1.54 UIU/ML (ref 0.45–4.5)
VLDLC SERPL CALC-MCNC: 19 MG/DL (ref 5–40)
WBC # BLD AUTO: 6.6 X10E3/UL (ref 3.4–10.8)

## 2022-01-12 NOTE — PROGRESS NOTES
Her titers show that she has immunity to [place], Rubeola and Mumps and is not in need of an MMR. These results indicated past exposure to these conditions. Your other labs are basically normal.  Some values may be minimally outside the  \"normal\" range but are not harmful or clinically significant. Please contact the office if you have questions or concerns.   We will recheck all your labs at your next office visit

## 2022-02-05 PROBLEM — Z00.00 ENCOUNTER FOR MEDICARE ANNUAL WELLNESS EXAM: Status: RESOLVED | Noted: 2022-01-06 | Resolved: 2022-02-05

## 2022-03-18 PROBLEM — E55.9 VITAMIN D DEFICIENCY: Status: ACTIVE | Noted: 2022-01-06

## 2022-03-18 PROBLEM — M19.90 ARTHRITIS: Status: ACTIVE | Noted: 2020-10-04

## 2022-03-18 PROBLEM — L24.9 IRRITANT DERMATITIS: Status: ACTIVE | Noted: 2020-11-09

## 2022-03-18 PROBLEM — M11.20 CHONDROCALCINOSIS DUE TO DICALCIUM PHOSPHATE CRYSTALS: Status: ACTIVE | Noted: 2020-10-04

## 2022-03-18 PROBLEM — M85.80 OSTEOPENIA: Status: ACTIVE | Noted: 2020-10-04

## 2022-03-19 PROBLEM — Z86.19 HISTORY OF HERPES ZOSTER: Status: ACTIVE | Noted: 2020-11-09

## 2022-03-19 PROBLEM — B35.1 TOENAIL FUNGUS: Status: ACTIVE | Noted: 2020-11-09

## 2022-03-19 PROBLEM — R76.0 ABNORMAL ANTIBODY TITER: Status: ACTIVE | Noted: 2022-01-06

## 2022-03-19 PROBLEM — R01.1 HEART MURMUR: Status: ACTIVE | Noted: 2020-11-09

## 2022-03-19 PROBLEM — Z76.0 MEDICATION REFILL: Status: ACTIVE | Noted: 2021-05-13

## 2022-03-19 PROBLEM — L82.1 SEBORRHEIC KERATOSES: Status: ACTIVE | Noted: 2020-11-09

## 2022-03-20 PROBLEM — E78.2 MIXED HYPERLIPIDEMIA: Status: ACTIVE | Noted: 2020-10-04

## 2022-04-18 ENCOUNTER — TRANSCRIBE ORDER (OUTPATIENT)
Dept: SCHEDULING | Age: 77
End: 2022-04-18

## 2022-04-18 DIAGNOSIS — Z12.31 SCREENING MAMMOGRAM FOR HIGH-RISK PATIENT: Primary | ICD-10-CM

## 2022-05-16 ENCOUNTER — HOSPITAL ENCOUNTER (OUTPATIENT)
Dept: MAMMOGRAPHY | Age: 77
Discharge: HOME | End: 2022-05-16
Attending: NURSE PRACTITIONER
Payer: MEDICARE

## 2022-05-16 DIAGNOSIS — Z12.31 SCREENING MAMMOGRAM FOR HIGH-RISK PATIENT: ICD-10-CM

## 2022-05-16 PROCEDURE — 77063 BREAST TOMOSYNTHESIS BI: CPT

## 2022-07-07 ENCOUNTER — TELEPHONE (OUTPATIENT)
Dept: FAMILY MEDICINE CLINIC | Age: 77
End: 2022-07-07

## 2022-07-07 ENCOUNTER — OFFICE VISIT (OUTPATIENT)
Dept: FAMILY MEDICINE CLINIC | Age: 77
End: 2022-07-07
Payer: MEDICARE

## 2022-07-07 VITALS
TEMPERATURE: 97.5 F | OXYGEN SATURATION: 98 % | WEIGHT: 143 LBS | HEART RATE: 66 BPM | HEIGHT: 64 IN | BODY MASS INDEX: 24.41 KG/M2 | DIASTOLIC BLOOD PRESSURE: 60 MMHG | SYSTOLIC BLOOD PRESSURE: 130 MMHG | RESPIRATION RATE: 16 BRPM

## 2022-07-07 DIAGNOSIS — E55.9 VITAMIN D DEFICIENCY: ICD-10-CM

## 2022-07-07 DIAGNOSIS — I10 ESSENTIAL HYPERTENSION, BENIGN: Primary | Chronic | ICD-10-CM

## 2022-07-07 DIAGNOSIS — Z13.29 THYROID DISORDER SCREENING: ICD-10-CM

## 2022-07-07 DIAGNOSIS — D62 ACUTE POSTHEMORRHAGIC ANEMIA: ICD-10-CM

## 2022-07-07 DIAGNOSIS — E78.2 MIXED HYPERLIPIDEMIA: ICD-10-CM

## 2022-07-07 DIAGNOSIS — R73.01 IMPAIRED FASTING GLUCOSE: ICD-10-CM

## 2022-07-07 PROCEDURE — G8536 NO DOC ELDER MAL SCRN: HCPCS | Performed by: NURSE PRACTITIONER

## 2022-07-07 PROCEDURE — 1123F ACP DISCUSS/DSCN MKR DOCD: CPT | Performed by: NURSE PRACTITIONER

## 2022-07-07 PROCEDURE — 1090F PRES/ABSN URINE INCON ASSESS: CPT | Performed by: NURSE PRACTITIONER

## 2022-07-07 PROCEDURE — G8427 DOCREV CUR MEDS BY ELIG CLIN: HCPCS | Performed by: NURSE PRACTITIONER

## 2022-07-07 PROCEDURE — G8399 PT W/DXA RESULTS DOCUMENT: HCPCS | Performed by: NURSE PRACTITIONER

## 2022-07-07 PROCEDURE — G8752 SYS BP LESS 140: HCPCS | Performed by: NURSE PRACTITIONER

## 2022-07-07 PROCEDURE — 1101F PT FALLS ASSESS-DOCD LE1/YR: CPT | Performed by: NURSE PRACTITIONER

## 2022-07-07 PROCEDURE — G8754 DIAS BP LESS 90: HCPCS | Performed by: NURSE PRACTITIONER

## 2022-07-07 PROCEDURE — G8420 CALC BMI NORM PARAMETERS: HCPCS | Performed by: NURSE PRACTITIONER

## 2022-07-07 PROCEDURE — G8432 DEP SCR NOT DOC, RNG: HCPCS | Performed by: NURSE PRACTITIONER

## 2022-07-07 PROCEDURE — 99214 OFFICE O/P EST MOD 30 MIN: CPT | Performed by: NURSE PRACTITIONER

## 2022-07-07 RX ORDER — DICLOFENAC SODIUM 75 MG/1
TABLET, DELAYED RELEASE ORAL
Qty: 180 TABLET | Refills: 1 | Status: SHIPPED | OUTPATIENT
Start: 2022-07-07

## 2022-07-07 RX ORDER — DICLOFENAC SODIUM 10 MG/G
2 GEL TOPICAL 4 TIMES DAILY
Qty: 20 G | Refills: 2 | Status: SHIPPED | OUTPATIENT
Start: 2022-07-07

## 2022-07-07 NOTE — PROGRESS NOTES
Chief Complaint   Patient presents with    Follow Up Chronic Condition     6mth f/u, Dr Janna Desai the 12th (cardio)     1. \"Have you been to the ER, urgent care clinic since your last visit? Hospitalized since your last visit? \" No    2. \"Have you seen or consulted any other health care providers outside of the [address] [place] [address] since your last visit? \" No     3. For patients aged 39-70: Has the patient had a colonoscopy / FIT/ Cologuard? Yes - no Care Gap present      If the patient is female:    4. For patients aged 41-77: Has the patient had a mammogram within the past 2 years? Yes - no Care Gap present      5. For patients aged 21-65: Has the patient had a pap smear?  Yes - no Care Gap present       Visit Vitals  /60 (BP 1 Location: Left upper arm, BP Patient Position: Sitting, BP Cuff Size: Adult)   Pulse 66   Temp 97.5 °F (36.4 °C) (Temporal)   Resp 16   Ht 5' 4\" (1.626 m)   Wt 143 lb (64.9 kg)   SpO2 98%   BMI 24.55 kg/m²        Food Insecurity: No Food Insecurity    Worried About Running Out of Food in the Last Year: Never true    Ann of Food in the Last Year: Never true        Financial Resource Strain: Low Risk     Difficulty of Paying Living Expenses: Not hard at all

## 2022-07-07 NOTE — PROGRESS NOTES
Subjective  Chief Complaint   Patient presents with    Follow Up Chronic Condition     6mth f/u, Dr Rausch Camera the 12th (cardio)     HPI:  Pamela Forbes is a 65 y.o. female. 66 yo female presents for f/u of chronic conditions which include anxiety, seasonal allergies and arthritis. She does not monitor her bp at home but is adherent with her medication regimen.   She is followed by cardiology every 6 months and has no specific complaints at this time    Past Medical History:   Diagnosis Date    Allergies     Anxiety     Arthritis     osteo  both knees    Arthritis     Cataract immature     Cataracts, bilateral 2/2/2021, 4/13/2021    Chondrocalcinosis due to dicalcium phosphate crystals 10/4/2020    Eye problems     Gout     Hx of colonoscopy approx 2000    Hypertension     Mixed hyperlipidemia 10/4/2020    Nausea & vomiting     Osteopenia 10/4/2020    Urinary tract infection, site not specified 7/2012     Family History   Problem Relation Age of Onset    Cancer Father     Cancer Sister         breast, ovary    Breast Cancer Sister     Delayed Awakening Other     Malignant Hyperthermia Neg Hx     Pseudocholinesterase Deficiency Neg Hx     Post-op Nausea/Vomiting Neg Hx     Emergence Delirium Neg Hx     Post-op Cognitive Dysfunction Neg Hx      Social History     Socioeconomic History    Marital status:      Spouse name: Not on file    Number of children: Not on file    Years of education: Not on file    Highest education level: Not on file   Occupational History    Not on file   Tobacco Use    Smoking status: Never Smoker    Smokeless tobacco: Never Used   Vaping Use    Vaping Use: Never used   Substance and Sexual Activity    Alcohol use: No    Drug use: No    Sexual activity: Not on file   Other Topics Concern    Not on file   Social History Narrative    Not on file     Social Determinants of Health     Financial Resource Strain: Low Risk     Difficulty of Paying Living Expenses: Not hard at all   Food Insecurity: No Food Insecurity    Worried About [facility] in the Last Year: Never true    Ann of Food in the Last Year: Never true   Transportation Needs:     Lack of Transportation (Medical): Not on file    Lack of Transportation (Non-Medical): Not on file   Physical Activity:     Days of Exercise per Week: Not on file    Minutes of Exercise per Session: Not on file   Stress:     Feeling of Stress : Not on file   Social Connections:     Frequency of Communication with Friends and Family: Not on file    Frequency of Social Gatherings with Friends and Family: Not on file    Attends Jew Services: Not on file    Active Member of Clubs or Organizations: Not on file    Attends Club or Organization Meetings: Not on file    Marital Status: Not on file   Intimate Partner Violence:     Fear of Current or Ex-Partner: Not on file    Emotionally Abused: Not on file    Physically Abused: Not on file    Sexually Abused: Not on file   Housing Stability:     Unable to Pay for Housing in the Last Year: Not on file    Number of Places Lived in the Last Year: Not on file    Unstable Housing in the Last Year: Not on file     Current Outpatient Medications on File Prior to Visit   Medication Sig Dispense Refill    tretinoin (RETIN-A) 0.025 % topical cream APPLY A THIN LAYER TO AFFECTED AREA(S) OF THE FACE AS NEEDED      cephALEXin (KEFLEX) 500 mg capsule TAKE FOUR CAPSULES BY MOUTH 1 HOUR PRIOR TO APPOINTMENT      diclofenac EC (VOLTAREN) 75 mg EC tablet TAKE ONE TABLET BY MOUTH TWICE A DAY AS NEEDED FOR PAIN 180 Tablet 1    prednisoLONE acetate (PRED FORTE) 1 % ophthalmic suspension       diclofenac (VOLTAREN) 1 % gel Apply 2 g to affected area four (4) times daily. 20 g 2    spironolactone (ALDACTONE) 25 mg tablet Take 25 mg by mouth daily.  vit C/vit E acet/lutein/min (LUTEIN VISON FORMULA PO) Take  by mouth.       amLODIPine-benazepril (LOTREL) 10-40 mg per capsule Take 1 Cap by mouth daily.  astaxanthin 4 mg cap Take  by mouth.  calcium crb,cit/D3/min34/frances (CITRACAL PLUS BONE DENSITY PO) Take  by mouth.  metoprolol succinate (TOPROL-XL) 100 mg tablet Take 100 mg by mouth daily.  pravastatin (PRAVACHOL) 20 mg tablet Take 20 mg by mouth nightly.  TURMERIC PO Take  by mouth. 500mg po bid      furosemide (LASIX) 40 mg tablet Take 40 mg by mouth daily.  [DISCONTINUED] gabapentin (NEURONTIN) 100 mg capsule gabapentin 100 mg capsule   TAKE ONE CAPSULE BY MOUTH THREE TIMES A DAY FOR 30 DAYS      [DISCONTINUED] calcium phosphate trib/vit D3 (CITRACAL-D3 GUMMIES PO) Take  by mouth.  [DISCONTINUED] nystatin (Nyamyc) powder Apply  to affected area four (4) times daily.  [DISCONTINUED] losartan (COZAAR) 100 mg tablet Take 100 mg by mouth daily.  [DISCONTINUED] amLODIPine (NORVASC) 5 mg tablet Take 5 mg by mouth daily. (Patient not taking: Reported on 1/6/2022)      [DISCONTINUED] citalopram (CELEXA) 20 mg tablet Take 10 mg by mouth daily. No current facility-administered medications on file prior to visit. Allergies   Allergen Reactions    Codeine Other (comments)    Darvocet A500 [Propoxyphene N-Acetaminophen] Not Reported This Time    Hydrochlorothiazide Rash    Morphine Not Reported This Time     ROS  ROS per HPI    Objective  Physical Exam  HENT:      Head: Normocephalic. Pulmonary:      Effort: Pulmonary effort is normal.      Breath sounds: Normal breath sounds. Abdominal:      General: Bowel sounds are normal.      Palpations: Abdomen is soft. Musculoskeletal:      Comments: Arthritic changes in bilateral hands   Neurological:      Mental Status: She is oriented to person, place, and time. Psychiatric:         Mood and Affect: Mood normal.         Behavior: Behavior normal.         Thought Content:  Thought content normal.         Judgment: Judgment normal.          Assessment & Plan ICD-10-CM ICD-9-CM    1. Essential hypertension, benign  I10 401.1 LIPID PANEL   2. Impaired fasting glucose  R73.01 790.21 HEMOGLOBIN A1C WITH EAG   3. Thyroid disorder screening  Z13.29 V77.0 TSH 3RD GENERATION      T4, FREE   4. Mixed hyperlipidemia  S13.2 425.0 METABOLIC PANEL, COMPREHENSIVE   5. Vitamin D deficiency  E55.9 268.9 VITAMIN D, 25 HYDROXY   6. Acute posthemorrhagic anemia  D62 285.1 CBC WITH AUTOMATED DIFF     Diagnoses and all orders for this visit:    1. Essential hypertension, benign  -     LIPID PANEL  Obtaining updated lipid panel for trending and will make treatment decisions when I get the results    2. Impaired fasting glucose  -     HEMOGLOBIN A1C WITH EAG  Obtaining updated A1C for trending and will make treatment decisions when I get the results    3. Thyroid disorder screening  -     TSH 3RD GENERATION  -     T4, FREE  Obtaining updated TSH and T4for trending and will make treatment decisions when I get the results    4. Mixed hyperlipidemia  -     METABOLIC PANEL, COMPREHENSIVE  Obtaining updated CMP for trending and will make treatment decisions when I get the results  5. Vitamin D deficiency  -     VITAMIN D, 25 HYDROXY  Obtaining updated Vitamin D for trending and will make treatment decisions when I get the results    6. Acute posthemorrhagic anemia  -     CBC WITH AUTOMATED DIFF  Obtaining updated CBC for trending and will make treatment decisions when I get the results      Follow-up and Dispositions    · Return in about 6 months (around 1/7/2023) for [address]  with physical nd fasting labs.        Pradip Vergara NP

## 2022-07-08 LAB
25(OH)D3+25(OH)D2 SERPL-MCNC: 46.8 NG/ML (ref 30–100)
ALBUMIN SERPL-MCNC: 4.6 G/DL (ref 3.7–4.7)
ALBUMIN/GLOB SERPL: 1.8 {RATIO} (ref 1.2–2.2)
ALP SERPL-CCNC: 94 IU/L (ref 44–121)
ALT SERPL-CCNC: 22 IU/L (ref 0–32)
AST SERPL-CCNC: 12 IU/L (ref 0–40)
BASOPHILS # BLD AUTO: 0.1 X10E3/UL (ref 0–0.2)
BASOPHILS NFR BLD AUTO: 1 %
BILIRUB SERPL-MCNC: 0.3 MG/DL (ref 0–1.2)
BUN SERPL-MCNC: 19 MG/DL (ref 8–27)
BUN/CREAT SERPL: 22 (ref 12–28)
CALCIUM SERPL-MCNC: 9.6 MG/DL (ref 8.7–10.3)
CHLORIDE SERPL-SCNC: 93 MMOL/L (ref 96–106)
CHOLEST SERPL-MCNC: 171 MG/DL (ref 100–199)
CO2 SERPL-SCNC: 24 MMOL/L (ref 20–29)
CREAT SERPL-MCNC: 0.85 MG/DL (ref 0.57–1)
EGFR: 70 ML/MIN/1.73
EOSINOPHIL # BLD AUTO: 0.1 X10E3/UL (ref 0–0.4)
EOSINOPHIL NFR BLD AUTO: 1 %
ERYTHROCYTE [DISTWIDTH] IN BLOOD BY AUTOMATED COUNT: 12.3 % (ref 11.7–15.4)
EST. AVERAGE GLUCOSE BLD GHB EST-MCNC: 128 MG/DL
GLOBULIN SER CALC-MCNC: 2.6 G/DL (ref 1.5–4.5)
GLUCOSE SERPL-MCNC: 101 MG/DL (ref 65–99)
HBA1C MFR BLD: 6.1 % (ref 4.8–5.6)
HCT VFR BLD AUTO: 33.4 % (ref 34–46.6)
HDLC SERPL-MCNC: 49 MG/DL
HGB BLD-MCNC: 10.9 G/DL (ref 11.1–15.9)
IMM GRANULOCYTES # BLD AUTO: 0 X10E3/UL (ref 0–0.1)
IMM GRANULOCYTES NFR BLD AUTO: 1 %
LDLC SERPL CALC-MCNC: 99 MG/DL (ref 0–99)
LYMPHOCYTES # BLD AUTO: 1.2 X10E3/UL (ref 0.7–3.1)
LYMPHOCYTES NFR BLD AUTO: 20 %
MCH RBC QN AUTO: 31.1 PG (ref 26.6–33)
MCHC RBC AUTO-ENTMCNC: 32.6 G/DL (ref 31.5–35.7)
MCV RBC AUTO: 95 FL (ref 79–97)
MONOCYTES # BLD AUTO: 0.6 X10E3/UL (ref 0.1–0.9)
MONOCYTES NFR BLD AUTO: 9 %
NEUTROPHILS # BLD AUTO: 4.1 X10E3/UL (ref 1.4–7)
NEUTROPHILS NFR BLD AUTO: 68 %
PLATELET # BLD AUTO: 225 X10E3/UL (ref 150–450)
POTASSIUM SERPL-SCNC: 4.4 MMOL/L (ref 3.5–5.2)
PROT SERPL-MCNC: 7.2 G/DL (ref 6–8.5)
RBC # BLD AUTO: 3.51 X10E6/UL (ref 3.77–5.28)
SODIUM SERPL-SCNC: 129 MMOL/L (ref 134–144)
T4 FREE SERPL-MCNC: 1.61 NG/DL (ref 0.82–1.77)
TRIGL SERPL-MCNC: 128 MG/DL (ref 0–149)
TSH SERPL DL<=0.005 MIU/L-ACNC: 1.86 UIU/ML (ref 0.45–4.5)
VLDLC SERPL CALC-MCNC: 23 MG/DL (ref 5–40)
WBC # BLD AUTO: 6 X10E3/UL (ref 3.4–10.8)

## 2022-08-06 NOTE — PROGRESS NOTES
Your sodium is slightly below baseline but this seems to have been a problem for the last 10 years so we will continue to monitor. Your A1C is basically stable from 5.9 to 6.1. Since you are still in the prediabetic level and it is below 7 I am not going to make diabetic med changes but I so recommend:   adhere to portion sizes, Avoid the intake of concentrated sweets such as cakes, pies and cookies, Avoid intake of soft drinks (even diet soft drinks), Increase intake of water, Increase intake of fiber. Your other labs are basically normal.  Some values may be minimally outside the  \"normal\" range but are not harmful or clinically significant. Please contact the office if you have questions or concerns.   We will recheck your labs at your next office visit

## 2023-01-06 RX ORDER — DICLOFENAC SODIUM 75 MG/1
TABLET, DELAYED RELEASE ORAL
Qty: 180 TABLET | Refills: 1 | Status: SHIPPED | OUTPATIENT
Start: 2023-01-06

## 2023-01-12 ENCOUNTER — OFFICE VISIT (OUTPATIENT)
Dept: FAMILY MEDICINE CLINIC | Age: 78
End: 2023-01-12
Payer: MEDICARE

## 2023-01-12 VITALS
WEIGHT: 141 LBS | DIASTOLIC BLOOD PRESSURE: 72 MMHG | HEART RATE: 72 BPM | TEMPERATURE: 97.2 F | SYSTOLIC BLOOD PRESSURE: 124 MMHG | HEIGHT: 64 IN | OXYGEN SATURATION: 96 % | BODY MASS INDEX: 24.07 KG/M2 | RESPIRATION RATE: 16 BRPM

## 2023-01-12 DIAGNOSIS — Z13.29 THYROID DISORDER SCREENING: ICD-10-CM

## 2023-01-12 DIAGNOSIS — M85.80 OSTEOPENIA, UNSPECIFIED LOCATION: ICD-10-CM

## 2023-01-12 DIAGNOSIS — Z78.0 ENCOUNTER FOR OSTEOPOROSIS SCREENING IN ASYMPTOMATIC POSTMENOPAUSAL PATIENT: ICD-10-CM

## 2023-01-12 DIAGNOSIS — E55.9 VITAMIN D DEFICIENCY: ICD-10-CM

## 2023-01-12 DIAGNOSIS — R01.1 HEART MURMUR: ICD-10-CM

## 2023-01-12 DIAGNOSIS — Z00.00 MEDICARE ANNUAL WELLNESS VISIT, INITIAL: Primary | ICD-10-CM

## 2023-01-12 DIAGNOSIS — I10 ESSENTIAL HYPERTENSION, BENIGN: ICD-10-CM

## 2023-01-12 DIAGNOSIS — E78.2 MIXED HYPERLIPIDEMIA: ICD-10-CM

## 2023-01-12 DIAGNOSIS — Z13.820 ENCOUNTER FOR OSTEOPOROSIS SCREENING IN ASYMPTOMATIC POSTMENOPAUSAL PATIENT: ICD-10-CM

## 2023-01-12 DIAGNOSIS — R73.01 IMPAIRED FASTING GLUCOSE: ICD-10-CM

## 2023-01-12 DIAGNOSIS — L24.9 IRRITANT DERMATITIS: ICD-10-CM

## 2023-01-12 DIAGNOSIS — L82.1 SEBORRHEIC KERATOSES: ICD-10-CM

## 2023-01-12 RX ORDER — OMEGA-3-ACID ETHYL ESTERS 1 G/1
2 CAPSULE, LIQUID FILLED ORAL 2 TIMES DAILY
COMMUNITY

## 2023-01-12 NOTE — PROGRESS NOTES
Subjective  Chief Complaint   Patient presents with    Annual Wellness Visit     MEDICARE     HPI:  Katiana Cheng is a 77 y.o. female. 65-year-old female presents for her annual Medicare wellness with physical and fasting labs. She is followed by cardiology every 6 months, [address] [place] [address] for cataracts every 6 months, and dermatology every 6 months. Her health screenings are as documented in the EMR. She does not monitor blood pressure at home. She has no current complaints at this time.     Past Medical History:   Diagnosis Date    Allergies     Anxiety     Arthritis     osteo  both knees    Arthritis     Cataract immature     Cataracts, bilateral 2/2/2021, 4/13/2021    Chondrocalcinosis due to dicalcium phosphate crystals 10/4/2020    Eye problems     Gout     Hx of colonoscopy approx 2000    Hypertension     Mixed hyperlipidemia 10/4/2020    Nausea & vomiting     Osteopenia 10/4/2020    Urinary tract infection, site not specified 7/2012     Family History   Problem Relation Age of Onset    Cancer Father     Cancer Sister         breast, ovary    Breast Cancer Sister     Delayed Awakening Other     Malignant Hyperthermia Neg Hx     Pseudocholinesterase Deficiency Neg Hx     Post-op Nausea/Vomiting Neg Hx     Emergence Delirium Neg Hx     Post-op Cognitive Dysfunction Neg Hx      Social History     Socioeconomic History    Marital status:      Spouse name: Not on file    Number of children: Not on file    Years of education: Not on file    Highest education level: Not on file   Occupational History    Not on file   Tobacco Use    Smoking status: Never    Smokeless tobacco: Never   Vaping Use    Vaping Use: Never used   Substance and Sexual Activity    Alcohol use: No    Drug use: No    Sexual activity: Not on file   Other Topics Concern    Not on file   Social History Narrative    Not on file     Social Determinants of Health     Financial Resource Strain: Low Risk     Difficulty of Paying Living Expenses: Not hard at all   Food Insecurity: No Food Insecurity    Worried About Running Out of Food in the Last Year: Never true    Ran Out of Food in the Last Year: Never true   Transportation Needs: Not on file   Physical Activity: Not on file   Stress: Not on file   Social Connections: Not on file   Intimate Partner Violence: Not on file   Housing Stability: Not on file     Current Outpatient Medications on File Prior to Visit   Medication Sig Dispense Refill    CALCIUM-VITAMIN D3 PO Take  by mouth. omega-3 acid ethyl esters (LOVAZA) 1 gram capsule Take 2 g by mouth two (2) times a day. diclofenac EC (VOLTAREN) 75 mg EC tablet TAKE ONE TABLET BY MOUTH TWICE A DAY AS NEEDED FOR PAIN 180 Tablet 1    diclofenac (VOLTAREN) 1 % gel Apply 2 g to affected area four (4) times daily. 20 g 2    tretinoin (RETIN-A) 0.025 % topical cream APPLY A THIN LAYER TO AFFECTED AREA(S) OF THE FACE AS NEEDED      prednisoLONE acetate (PRED FORTE) 1 % ophthalmic suspension       spironolactone (ALDACTONE) 25 mg tablet Take 25 mg by mouth daily. vit C/vit E acet/lutein/min (LUTEIN VISON FORMULA PO) Take  by mouth. amLODIPine-benazepril (LOTREL) 10-40 mg per capsule Take 1 Cap by mouth daily. astaxanthin 4 mg cap Take  by mouth.      calcium crb,cit/D3/min34/frances (CITRACAL PLUS BONE DENSITY PO) Take  by mouth.      metoprolol succinate (TOPROL-XL) 100 mg tablet Take 100 mg by mouth daily. pravastatin (PRAVACHOL) 20 mg tablet Take 20 mg by mouth nightly. TURMERIC PO Take  by mouth. 500mg po bid      furosemide (LASIX) 40 mg tablet Take 40 mg by mouth daily. [DISCONTINUED] cephALEXin (KEFLEX) 500 mg capsule TAKE FOUR CAPSULES BY MOUTH 1 HOUR PRIOR TO APPOINTMENT (Patient not taking: Reported on 1/12/2023)       No current facility-administered medications on file prior to visit.      Allergies   Allergen Reactions    Codeine Other (comments)    Darvocet A500 [Propoxyphene N-Acetaminophen] Not Reported This Time    Hydrochlorothiazide Rash    Morphine Not Reported This Time     ROS  ROS per HPI and PMH      Objective  Physical Exam  Vitals and nursing note reviewed. HENT:      Head: Normocephalic. Cardiovascular:      Rate and Rhythm: Normal rate and regular rhythm. Pulmonary:      Effort: Pulmonary effort is normal.      Breath sounds: Normal breath sounds. Abdominal:      General: Bowel sounds are normal.      Palpations: Abdomen is soft. Skin:     General: Skin is warm and dry. Psychiatric:         Mood and Affect: Mood normal.         Behavior: Behavior normal.         Thought Content: Thought content normal.         Judgment: Judgment normal.        Assessment & Plan      ICD-10-CM ICD-9-CM    1. Medicare annual wellness visit, initial  Z00.00 V70.0       2. Essential hypertension, benign  I10 401.1 CBC WITH AUTOMATED DIFF      METABOLIC PANEL, COMPREHENSIVE      3. Impaired fasting glucose  R73.01 790.21       4. Mixed hyperlipidemia  E78.2 272.2 LIPID PANEL      5. Vitamin D deficiency  E55.9 268.9 VITAMIN D, 25 HYDROXY      6. Thyroid disorder screening  Z13.29 V77.0 TSH 3RD GENERATION      T4, FREE      7. Encounter for osteoporosis screening in asymptomatic postmenopausal patient  Z13.820 V82.81 DEXA BONE DENSITY STUDY AXIAL    Z78.0 V49.81 DEXA BONE DENSITY STUDY AXIAL      8. Heart murmur  R01.1 785.2       9. Irritant dermatitis  L24.9 692.9       10. Osteopenia, unspecified location  M85.80 733.90       11. Seborrheic keratoses  L82.1 702.19         Diagnoses and all orders for this visit:    1. Medicare annual wellness visit, initial  We are making sure that her health screenings are done in a timely fashion. They are as documented in the EMR. 2. Essential hypertension, benign  -     CBC WITH AUTOMATED DIFF  -     METABOLIC PANEL, COMPREHENSIVE  BP is at goal at 124/72. We will maintain patient on current regimen at current dosage.   Obtaining updated CBC and CMP for trending and will make treatment decisions when I get the results. 3. Impaired fasting glucose  Obtaining updated A1c for trending and will make treatment decisions results. 4. Mixed hyperlipidemia  -     LIPID PANEL  Obtaining updated lipid panel for trending and will make treatment decisions when I get the results. 5. Vitamin D deficiency  -     VITAMIN D, 25 HYDROXY  Obtaining updated vitamin D for trending and will make supplementation when I get the results. 6. Thyroid disorder screening  -     TSH 3RD GENERATION  -     T4, FREE  Obtaining updated TSH and T4 for trending and will make treatment decisions when I get the results. 7. Encounter for osteoporosis screening in asymptomatic postmenopausal patient  -     DEXA BONE DENSITY STUDY AXIAL; Future  Referral for bone density for further clinical information. 8. Heart murmur  Patient is stable at this time with her heart murmur and will continue to be adherent with her appointments with her cardiologist    9. Irritant dermatitis  Patient is stable with her dermatitis and will continue to see her dermatologist every year for surveillance. She is stable at this time. 10. Osteopenia, unspecified location  Referral will make further treatment decisions when I get the results. 11. Seborrheic keratoses  Patient is stable with her dermatitis and will continue to see her dermatologist every year for surveillance. She is stable at this time. Follow-up and Dispositions    Return in 6 months (on 7/12/2023) for F/U OF CHRONIC CONDITIONS/FASTING LABS AND cpe.        Jorge Luong NP

## 2023-01-12 NOTE — PROGRESS NOTES
Chief Complaint   Patient presents with    Annual Wellness Visit     MEDICARE     1. \"Have you been to the ER, urgent care clinic since your last visit? Hospitalized since your last visit? \" No    2. \"Have you seen or consulted any other health care providers outside of the [address] [place] [address] since your last visit? \" No     3. For patients aged 39-70: Has the patient had a colonoscopy / FIT/ Cologuard? Yes - no Care Gap present      If the patient is female:    4. For patients aged 41-77: Has the patient had a mammogram within the past 2 years? Yes - no Care Gap present      5. For patients aged 21-65: Has the patient had a pap smear? NA - based on age or sex  Visit Vitals  /72 (BP 1 Location: Left upper arm, BP Patient Position: Sitting, BP Cuff Size: Adult)   Pulse 72   Temp 97.2 °F (36.2 °C) (Temporal)   Resp 16   Ht 5' 4\" (1.626 m)   Wt 141 lb (64 kg)   SpO2 96%   BMI 24.20 kg/m²     3 most recent PHQ Screens 1/12/2023   Little interest or pleasure in doing things Not at all   Feeling down, depressed, irritable, or hopeless Not at all   Total Score PHQ 2 0     Fall Risk Assessment, last 12 mths 1/12/2023   Able to walk? Yes   Fall in past 12 months? 1   Do you feel unsteady? 0   Are you worried about falling 0   Is TUG test greater than 12 seconds? 1   Is the gait abnormal? 0   Number of falls in past 12 months 1   Fall with injury?  1

## 2023-01-13 LAB
25(OH)D3+25(OH)D2 SERPL-MCNC: 55.7 NG/ML (ref 30–100)
ALBUMIN SERPL-MCNC: 4.9 G/DL (ref 3.7–4.7)
ALBUMIN/GLOB SERPL: 2 {RATIO} (ref 1.2–2.2)
ALP SERPL-CCNC: 118 IU/L (ref 44–121)
ALT SERPL-CCNC: 26 IU/L (ref 0–32)
AST SERPL-CCNC: 17 IU/L (ref 0–40)
BASOPHILS # BLD AUTO: 0.1 X10E3/UL (ref 0–0.2)
BASOPHILS NFR BLD AUTO: 1 %
BILIRUB SERPL-MCNC: 0.4 MG/DL (ref 0–1.2)
BUN SERPL-MCNC: 16 MG/DL (ref 8–27)
BUN/CREAT SERPL: 19 (ref 12–28)
CALCIUM SERPL-MCNC: 10 MG/DL (ref 8.7–10.3)
CHLORIDE SERPL-SCNC: 95 MMOL/L (ref 96–106)
CHOLEST SERPL-MCNC: 178 MG/DL (ref 100–199)
CO2 SERPL-SCNC: 22 MMOL/L (ref 20–29)
CREAT SERPL-MCNC: 0.86 MG/DL (ref 0.57–1)
EGFR: 69 ML/MIN/1.73
EOSINOPHIL # BLD AUTO: 0.1 X10E3/UL (ref 0–0.4)
EOSINOPHIL NFR BLD AUTO: 2 %
ERYTHROCYTE [DISTWIDTH] IN BLOOD BY AUTOMATED COUNT: 14.1 % (ref 11.7–15.4)
GLOBULIN SER CALC-MCNC: 2.4 G/DL (ref 1.5–4.5)
GLUCOSE SERPL-MCNC: 103 MG/DL (ref 70–99)
HCT VFR BLD AUTO: 36.4 % (ref 34–46.6)
HDLC SERPL-MCNC: 51 MG/DL
HGB BLD-MCNC: 12.1 G/DL (ref 11.1–15.9)
IMM GRANULOCYTES # BLD AUTO: 0 X10E3/UL (ref 0–0.1)
IMM GRANULOCYTES NFR BLD AUTO: 1 %
LDLC SERPL CALC-MCNC: 105 MG/DL (ref 0–99)
LYMPHOCYTES # BLD AUTO: 1.1 X10E3/UL (ref 0.7–3.1)
LYMPHOCYTES NFR BLD AUTO: 18 %
MCH RBC QN AUTO: 30.3 PG (ref 26.6–33)
MCHC RBC AUTO-ENTMCNC: 33.2 G/DL (ref 31.5–35.7)
MCV RBC AUTO: 91 FL (ref 79–97)
MONOCYTES # BLD AUTO: 0.7 X10E3/UL (ref 0.1–0.9)
MONOCYTES NFR BLD AUTO: 11 %
NEUTROPHILS # BLD AUTO: 4.1 X10E3/UL (ref 1.4–7)
NEUTROPHILS NFR BLD AUTO: 67 %
PLATELET # BLD AUTO: 213 X10E3/UL (ref 150–450)
POTASSIUM SERPL-SCNC: 4.3 MMOL/L (ref 3.5–5.2)
PROT SERPL-MCNC: 7.3 G/DL (ref 6–8.5)
RBC # BLD AUTO: 3.99 X10E6/UL (ref 3.77–5.28)
SODIUM SERPL-SCNC: 135 MMOL/L (ref 134–144)
T4 FREE SERPL-MCNC: 1.61 NG/DL (ref 0.82–1.77)
TRIGL SERPL-MCNC: 125 MG/DL (ref 0–149)
TSH SERPL DL<=0.005 MIU/L-ACNC: 1.92 UIU/ML (ref 0.45–4.5)
VLDLC SERPL CALC-MCNC: 22 MG/DL (ref 5–40)
WBC # BLD AUTO: 6 X10E3/UL (ref 3.4–10.8)

## 2023-01-24 ENCOUNTER — HOSPITAL ENCOUNTER (OUTPATIENT)
Dept: MAMMOGRAPHY | Age: 78
Discharge: HOME | End: 2023-01-24
Attending: NURSE PRACTITIONER
Payer: MEDICARE

## 2023-01-24 PROCEDURE — 77080 DXA BONE DENSITY AXIAL: CPT

## 2023-02-11 PROBLEM — Z13.820 ENCOUNTER FOR OSTEOPOROSIS SCREENING IN ASYMPTOMATIC POSTMENOPAUSAL PATIENT: Status: RESOLVED | Noted: 2022-01-06 | Resolved: 2023-02-11

## 2023-02-11 PROBLEM — Z78.0 ENCOUNTER FOR OSTEOPOROSIS SCREENING IN ASYMPTOMATIC POSTMENOPAUSAL PATIENT: Status: RESOLVED | Noted: 2022-01-06 | Resolved: 2023-02-11

## 2023-04-17 ENCOUNTER — TRANSCRIBE ORDER (OUTPATIENT)
Dept: SCHEDULING | Age: 78
End: 2023-04-17

## 2023-04-17 DIAGNOSIS — Z12.31 SCREENING MAMMOGRAM FOR HIGH-RISK PATIENT: Primary | ICD-10-CM

## 2023-04-22 ENCOUNTER — TRANSCRIBE ORDERS (OUTPATIENT)
Facility: HOSPITAL | Age: 78
End: 2023-04-22

## 2023-04-22 DIAGNOSIS — Z12.31 VISIT FOR SCREENING MAMMOGRAM: Primary | ICD-10-CM

## 2023-04-23 DIAGNOSIS — Z12.31 SCREENING MAMMOGRAM FOR HIGH-RISK PATIENT: Primary | ICD-10-CM

## 2023-04-24 ENCOUNTER — TRANSCRIBE ORDERS (OUTPATIENT)
Facility: HOSPITAL | Age: 78
End: 2023-04-24

## 2023-04-24 DIAGNOSIS — Z12.31 SCREENING MAMMOGRAM FOR HIGH-RISK PATIENT: Primary | ICD-10-CM

## 2023-05-23 ENCOUNTER — HOSPITAL ENCOUNTER (OUTPATIENT)
Facility: HOSPITAL | Age: 78
Discharge: HOME | End: 2023-05-26
Payer: MEDICARE

## 2023-05-23 DIAGNOSIS — Z12.31 SCREENING MAMMOGRAM FOR HIGH-RISK PATIENT: ICD-10-CM

## 2023-05-23 PROCEDURE — 77063 BREAST TOMOSYNTHESIS BI: CPT

## 2023-05-24 ENCOUNTER — TELEPHONE (OUTPATIENT)
Facility: CLINIC | Age: 78
End: 2023-05-24

## 2023-05-24 DIAGNOSIS — Z12.31 VISIT FOR SCREENING MAMMOGRAM: ICD-10-CM

## 2023-05-24 DIAGNOSIS — N63.20 MASS OF LEFT BREAST ON MAMMOGRAM: ICD-10-CM

## 2023-05-25 DIAGNOSIS — N63.20 MASS OF LEFT BREAST ON MAMMOGRAM: Primary | ICD-10-CM

## 2023-05-26 ENCOUNTER — TELEPHONE (OUTPATIENT)
Facility: CLINIC | Age: 78
End: 2023-05-26

## 2023-05-26 NOTE — TELEPHONE ENCOUNTER
----- Message from Kurt Elizondo, [address] - NP sent at 5/25/2023 10:40 AM EDT -----  Regarding: order for US and spot compression  Order in for US and spot compression

## 2023-05-30 ENCOUNTER — TRANSCRIBE ORDERS (OUTPATIENT)
Facility: HOSPITAL | Age: 78
End: 2023-05-30

## 2023-05-30 DIAGNOSIS — R92.8 ABNORMAL MAMMOGRAM: Primary | ICD-10-CM

## 2023-06-15 ENCOUNTER — HOSPITAL ENCOUNTER (OUTPATIENT)
Facility: HOSPITAL | Age: 78
Discharge: HOME | End: 2023-06-18
Payer: MEDICARE

## 2023-06-15 DIAGNOSIS — R92.8 ABNORMAL MAMMOGRAM: ICD-10-CM

## 2023-06-15 PROCEDURE — G0279 TOMOSYNTHESIS, MAMMO: HCPCS

## 2023-07-13 RX ORDER — DICLOFENAC SODIUM 75 MG/1
TABLET, DELAYED RELEASE ORAL
Qty: 180 TABLET | Refills: 1 | Status: SHIPPED | OUTPATIENT
Start: 2023-07-13

## 2023-07-20 ENCOUNTER — OFFICE VISIT (OUTPATIENT)
Facility: CLINIC | Age: 78
End: 2023-07-20
Payer: MEDICARE

## 2023-07-20 VITALS
BODY MASS INDEX: 23.73 KG/M2 | DIASTOLIC BLOOD PRESSURE: 78 MMHG | TEMPERATURE: 97.3 F | WEIGHT: 139 LBS | HEIGHT: 64 IN | HEART RATE: 72 BPM | SYSTOLIC BLOOD PRESSURE: 130 MMHG | OXYGEN SATURATION: 96 % | RESPIRATION RATE: 16 BRPM

## 2023-07-20 DIAGNOSIS — M11.20: ICD-10-CM

## 2023-07-20 DIAGNOSIS — Z13.0 SCREENING FOR DEFICIENCY ANEMIA: ICD-10-CM

## 2023-07-20 DIAGNOSIS — E55.9 VITAMIN D DEFICIENCY: ICD-10-CM

## 2023-07-20 DIAGNOSIS — M20.10 ACQUIRED HALLUX VALGUS, UNSPECIFIED LATERALITY: ICD-10-CM

## 2023-07-20 DIAGNOSIS — Z13.228 ENCOUNTER FOR SCREENING FOR OTHER METABOLIC DISORDERS: ICD-10-CM

## 2023-07-20 DIAGNOSIS — R01.1 HEART MURMUR: ICD-10-CM

## 2023-07-20 DIAGNOSIS — I10 ESSENTIAL HYPERTENSION, BENIGN: ICD-10-CM

## 2023-07-20 DIAGNOSIS — E78.2 MIXED HYPERLIPIDEMIA: ICD-10-CM

## 2023-07-20 DIAGNOSIS — R73.01 IMPAIRED FASTING GLUCOSE: Primary | ICD-10-CM

## 2023-07-20 PROBLEM — Z13.820 ENCOUNTER FOR OSTEOPOROSIS SCREENING IN ASYMPTOMATIC POSTMENOPAUSAL PATIENT: Status: RESOLVED | Noted: 2022-01-06 | Resolved: 2023-07-20

## 2023-07-20 PROBLEM — Z78.0 ENCOUNTER FOR OSTEOPOROSIS SCREENING IN ASYMPTOMATIC POSTMENOPAUSAL PATIENT: Status: RESOLVED | Noted: 2022-01-06 | Resolved: 2023-07-20

## 2023-07-20 PROCEDURE — 3078F DIAST BP <80 MM HG: CPT | Performed by: NURSE PRACTITIONER

## 2023-07-20 PROCEDURE — 1090F PRES/ABSN URINE INCON ASSESS: CPT | Performed by: NURSE PRACTITIONER

## 2023-07-20 PROCEDURE — 3075F SYST BP GE 130 - 139MM HG: CPT | Performed by: NURSE PRACTITIONER

## 2023-07-20 PROCEDURE — G8399 PT W/DXA RESULTS DOCUMENT: HCPCS | Performed by: NURSE PRACTITIONER

## 2023-07-20 PROCEDURE — G8420 CALC BMI NORM PARAMETERS: HCPCS | Performed by: NURSE PRACTITIONER

## 2023-07-20 PROCEDURE — 1123F ACP DISCUSS/DSCN MKR DOCD: CPT | Performed by: NURSE PRACTITIONER

## 2023-07-20 PROCEDURE — 99214 OFFICE O/P EST MOD 30 MIN: CPT | Performed by: NURSE PRACTITIONER

## 2023-07-20 PROCEDURE — G8427 DOCREV CUR MEDS BY ELIG CLIN: HCPCS | Performed by: NURSE PRACTITIONER

## 2023-07-20 PROCEDURE — 1036F TOBACCO NON-USER: CPT | Performed by: NURSE PRACTITIONER

## 2023-07-20 SDOH — ECONOMIC STABILITY: FOOD INSECURITY: WITHIN THE PAST 12 MONTHS, YOU WORRIED THAT YOUR FOOD WOULD RUN OUT BEFORE YOU GOT MONEY TO BUY MORE.: NEVER TRUE

## 2023-07-20 SDOH — ECONOMIC STABILITY: INCOME INSECURITY: HOW HARD IS IT FOR YOU TO PAY FOR THE VERY BASICS LIKE FOOD, HOUSING, MEDICAL CARE, AND HEATING?: NOT HARD AT ALL

## 2023-07-20 SDOH — ECONOMIC STABILITY: HOUSING INSECURITY
IN THE LAST 12 MONTHS, WAS THERE A TIME WHEN YOU DID NOT HAVE A STEADY PLACE TO SLEEP OR SLEPT IN A SHELTER (INCLUDING NOW)?: NO

## 2023-07-20 SDOH — ECONOMIC STABILITY: FOOD INSECURITY: WITHIN THE PAST 12 MONTHS, THE FOOD YOU BOUGHT JUST DIDN'T LAST AND YOU DIDN'T HAVE MONEY TO GET MORE.: NEVER TRUE

## 2023-07-20 NOTE — PROGRESS NOTES
Subjective    Chief Complaint   Patient presents with    6 Month Follow-Up       HPI:    Lee Curry is a 77 y.o. female. 77-year-old female presents for follow-up of her chronic conditions which include heart murmur, hypertension, impaired fasting glucose, arthritis, osteopenia, hypercholesterolemia, pseudogout, of the shingles in her left eye. She is followed by Dr. Teodora Lomas every 6 months. She has no current complaints at this time. [email or web address]  @[email or web address]@  [unfilled]  Current Outpatient Medications on File Prior to Visit   Medication Sig Dispense Refill    APPLE CIDER VINEGAR PO Take by mouth      Misc Natural Products (YUMVS BEET ROOT-TART CHERRY PO) Take by mouth      diclofenac (VOLTAREN) 75 MG EC tablet TAKE ONE TABLET BY MOUTH TWICE A DAY AS NEEDED FOR PAIN 180 tablet 1    Calcium Carbonate-Vitamin D (CALCIUM-VITAMIN D3 PO) Take by mouth      TURMERIC PO Take by mouth      amLODIPine-benazepril (LOTREL) 10-40 MG per capsule Take 1 capsule by mouth daily      Astaxanthin 4 MG CAPS Take by mouth      diclofenac sodium (VOLTAREN) 1 % GEL Apply 2 g topically 4 times daily      furosemide (LASIX) 40 MG tablet Take 1 tablet by mouth daily      metoprolol succinate (TOPROL XL) 100 MG extended release tablet Take 1 tablet by mouth daily      Omega-3 Fatty Acids (FISH OIL) 1000 MG capsule Take 2 capsules by mouth 2 times daily      pravastatin (PRAVACHOL) 20 MG tablet Take 1 tablet by mouth      prednisoLONE acetate (PRED FORTE) 1 % ophthalmic suspension ceived the following from [facility]: Outside name: prednisoLONE acetate (PRED FORTE) 1 % ophthalmic suspension      spironolactone (ALDACTONE) 25 MG tablet Take 1 tablet by mouth daily      tretinoin (RETIN-A) 0.025 % cream APPLY A THIN LAYER TO AFFECTED AREA(S) OF THE FACE AS NEEDED       No current facility-administered medications on file prior to visit.      Allergies   Allergen Reactions    Codeine Other (See Comments)

## 2023-07-21 LAB
25(OH)D3+25(OH)D2 SERPL-MCNC: 58.7 NG/ML (ref 30–100)
ALBUMIN SERPL-MCNC: 4.5 G/DL (ref 3.8–4.8)
ALBUMIN/GLOB SERPL: 1.9 {RATIO} (ref 1.2–2.2)
ALP SERPL-CCNC: 104 IU/L (ref 44–121)
ALT SERPL-CCNC: 22 IU/L (ref 0–32)
AST SERPL-CCNC: 17 IU/L (ref 0–40)
BASOPHILS # BLD AUTO: 0 X10E3/UL (ref 0–0.2)
BASOPHILS NFR BLD AUTO: 1 %
BILIRUB SERPL-MCNC: 0.3 MG/DL (ref 0–1.2)
BUN SERPL-MCNC: 15 MG/DL (ref 8–27)
BUN/CREAT SERPL: 17 (ref 12–28)
CALCIUM SERPL-MCNC: 9.4 MG/DL (ref 8.7–10.3)
CHLORIDE SERPL-SCNC: 94 MMOL/L (ref 96–106)
CHOLEST SERPL-MCNC: 163 MG/DL (ref 100–199)
CO2 SERPL-SCNC: 24 MMOL/L (ref 20–29)
CREAT SERPL-MCNC: 0.9 MG/DL (ref 0.57–1)
EGFRCR SERPLBLD CKD-EPI 2021: 65 ML/MIN/1.73
EOSINOPHIL # BLD AUTO: 0.1 X10E3/UL (ref 0–0.4)
EOSINOPHIL NFR BLD AUTO: 2 %
ERYTHROCYTE [DISTWIDTH] IN BLOOD BY AUTOMATED COUNT: 13.1 % (ref 11.7–15.4)
GLOBULIN SER CALC-MCNC: 2.4 G/DL (ref 1.5–4.5)
GLUCOSE SERPL-MCNC: 99 MG/DL (ref 70–99)
HBA1C MFR BLD: 6 % (ref 4.8–5.6)
HCT VFR BLD AUTO: 37.4 % (ref 34–46.6)
HDLC SERPL-MCNC: 47 MG/DL
HGB BLD-MCNC: 11.9 G/DL (ref 11.1–15.9)
IMM GRANULOCYTES # BLD AUTO: 0 X10E3/UL (ref 0–0.1)
IMM GRANULOCYTES NFR BLD AUTO: 1 %
LDLC SERPL CALC-MCNC: 96 MG/DL (ref 0–99)
LYMPHOCYTES # BLD AUTO: 0.9 X10E3/UL (ref 0.7–3.1)
LYMPHOCYTES NFR BLD AUTO: 19 %
MCH RBC QN AUTO: 29.6 PG (ref 26.6–33)
MCHC RBC AUTO-ENTMCNC: 31.8 G/DL (ref 31.5–35.7)
MCV RBC AUTO: 93 FL (ref 79–97)
MONOCYTES # BLD AUTO: 0.6 X10E3/UL (ref 0.1–0.9)
MONOCYTES NFR BLD AUTO: 12 %
NEUTROPHILS # BLD AUTO: 3.2 X10E3/UL (ref 1.4–7)
NEUTROPHILS NFR BLD AUTO: 65 %
PLATELET # BLD AUTO: 198 X10E3/UL (ref 150–450)
POTASSIUM SERPL-SCNC: 4.2 MMOL/L (ref 3.5–5.2)
PROT SERPL-MCNC: 6.9 G/DL (ref 6–8.5)
RBC # BLD AUTO: 4.02 X10E6/UL (ref 3.77–5.28)
SODIUM SERPL-SCNC: 132 MMOL/L (ref 134–144)
TRIGL SERPL-MCNC: 110 MG/DL (ref 0–149)
VLDLC SERPL CALC-MCNC: 20 MG/DL (ref 5–40)
WBC # BLD AUTO: 4.8 X10E3/UL (ref 3.4–10.8)

## 2023-08-19 PROBLEM — Z13.0 SCREENING FOR DEFICIENCY ANEMIA: Status: RESOLVED | Noted: 2022-01-06 | Resolved: 2023-08-19

## 2024-01-08 RX ORDER — DICLOFENAC SODIUM 75 MG/1
TABLET, DELAYED RELEASE ORAL
Qty: 180 TABLET | Refills: 1 | Status: SHIPPED | OUTPATIENT
Start: 2024-01-08

## 2024-02-03 ENCOUNTER — HOSPITAL ENCOUNTER (INPATIENT)
Facility: HOSPITAL | Age: 79
LOS: 6 days | Discharge: HOME | End: 2024-02-09
Attending: STUDENT IN AN ORGANIZED HEALTH CARE EDUCATION/TRAINING PROGRAM | Admitting: INTERNAL MEDICINE
Payer: MEDICARE

## 2024-02-03 ENCOUNTER — APPOINTMENT (OUTPATIENT)
Facility: HOSPITAL | Age: 79
End: 2024-02-03
Payer: MEDICARE

## 2024-02-03 DIAGNOSIS — I48.0 PAROXYSMAL ATRIAL FIBRILLATION (HCC): ICD-10-CM

## 2024-02-03 DIAGNOSIS — U07.1 COVID-19: ICD-10-CM

## 2024-02-03 DIAGNOSIS — E87.1 HYPONATREMIA: Primary | ICD-10-CM

## 2024-02-03 LAB
ALBUMIN SERPL-MCNC: 3.2 G/DL (ref 3.5–5)
ALBUMIN/GLOB SERPL: 0.9 (ref 1.1–2.2)
ALP SERPL-CCNC: 82 U/L (ref 45–117)
ALT SERPL-CCNC: 29 U/L (ref 12–78)
ANION GAP SERPL CALC-SCNC: 8 MMOL/L (ref 5–15)
ANION GAP SERPL CALC-SCNC: 8 MMOL/L (ref 5–15)
APPEARANCE UR: CLEAR
AST SERPL W P-5'-P-CCNC: 25 U/L (ref 15–37)
BACTERIA URNS QL MICRO: NEGATIVE /HPF
BASOPHILS # BLD: 0 K/UL (ref 0–0.1)
BASOPHILS NFR BLD: 0 % (ref 0–1)
BILIRUB SERPL-MCNC: 0.8 MG/DL (ref 0.2–1)
BILIRUB UR QL: NEGATIVE
BNP SERPL-MCNC: 4443 PG/ML
BUN SERPL-MCNC: 12 MG/DL (ref 6–20)
BUN SERPL-MCNC: 14 MG/DL (ref 6–20)
BUN/CREAT SERPL: 16 (ref 12–20)
BUN/CREAT SERPL: 16 (ref 12–20)
CA-I BLD-MCNC: 8.3 MG/DL (ref 8.5–10.1)
CA-I BLD-MCNC: 8.4 MG/DL (ref 8.5–10.1)
CHLORIDE SERPL-SCNC: 78 MMOL/L (ref 97–108)
CHLORIDE SERPL-SCNC: 88 MMOL/L (ref 97–108)
CHLORIDE UR-SCNC: <10 MMOL/L
CO2 SERPL-SCNC: 26 MMOL/L (ref 21–32)
CO2 SERPL-SCNC: 30 MMOL/L (ref 21–32)
COLOR UR: ABNORMAL
CREAT SERPL-MCNC: 0.74 MG/DL (ref 0.55–1.02)
CREAT SERPL-MCNC: 0.89 MG/DL (ref 0.55–1.02)
D DIMER PPP FEU-MCNC: 1.58 UG/ML(FEU)
DIFFERENTIAL METHOD BLD: ABNORMAL
EKG ATRIAL RATE: 416 BPM
EKG DIAGNOSIS: NORMAL
EKG Q-T INTERVAL: 388 MS
EKG QRS DURATION: 84 MS
EKG QTC CALCULATION (BAZETT): 485 MS
EKG R AXIS: 54 DEGREES
EKG T AXIS: 69 DEGREES
EKG VENTRICULAR RATE: 94 BPM
EOSINOPHIL # BLD: 0 K/UL (ref 0–0.4)
EOSINOPHIL NFR BLD: 0 % (ref 0–7)
EPITH CASTS URNS QL MICRO: ABNORMAL /LPF
ERYTHROCYTE [DISTWIDTH] IN BLOOD BY AUTOMATED COUNT: 13.9 % (ref 11.5–14.5)
GLOBULIN SER CALC-MCNC: 3.5 G/DL (ref 2–4)
GLUCOSE SERPL-MCNC: 127 MG/DL (ref 65–100)
GLUCOSE SERPL-MCNC: 142 MG/DL (ref 65–100)
GLUCOSE UR STRIP.AUTO-MCNC: NEGATIVE MG/DL
HCT VFR BLD AUTO: 35.5 % (ref 35–47)
HGB BLD-MCNC: 12.6 G/DL (ref 11.5–16)
HGB UR QL STRIP: ABNORMAL
IMM GRANULOCYTES # BLD AUTO: 0 K/UL (ref 0–0.04)
IMM GRANULOCYTES NFR BLD AUTO: 1 % (ref 0–0.5)
KETONES UR QL STRIP.AUTO: NEGATIVE MG/DL
LEUKOCYTE ESTERASE UR QL STRIP.AUTO: NEGATIVE
LYMPHOCYTES # BLD: 0.4 K/UL (ref 0.8–3.5)
LYMPHOCYTES NFR BLD: 4 % (ref 12–49)
MCH RBC QN AUTO: 28.9 PG (ref 26–34)
MCHC RBC AUTO-ENTMCNC: 35.5 G/DL (ref 30–36.5)
MCV RBC AUTO: 81.4 FL (ref 80–99)
MONOCYTES # BLD: 0.7 K/UL (ref 0–1)
MONOCYTES NFR BLD: 8 % (ref 5–13)
MUCOUS THREADS URNS QL MICRO: NEGATIVE /LPF
NEUTS SEG # BLD: 7.5 K/UL (ref 1.8–8)
NEUTS SEG NFR BLD: 87 % (ref 32–75)
NITRITE UR QL STRIP.AUTO: NEGATIVE
NRBC # BLD: 0 K/UL (ref 0–0.01)
NRBC BLD-RTO: 0 PER 100 WBC
OSMOLALITY UR: 200 MOSM/KG H2O
PH UR STRIP: 7 (ref 5–8)
PLATELET # BLD AUTO: 185 K/UL (ref 150–400)
PMV BLD AUTO: 12.6 FL (ref 8.9–12.9)
POTASSIUM SERPL-SCNC: 3.2 MMOL/L (ref 3.5–5.1)
POTASSIUM SERPL-SCNC: 3.3 MMOL/L (ref 3.5–5.1)
POTASSIUM UR-SCNC: 14 MMOL/L
PROT SERPL-MCNC: 6.7 G/DL (ref 6.4–8.2)
PROT UR STRIP-MCNC: 100 MG/DL
RBC # BLD AUTO: 4.36 M/UL (ref 3.8–5.2)
RBC #/AREA URNS HPF: ABNORMAL /HPF (ref 0–5)
SARS-COV-2 RDRP RESP QL NAA+PROBE: DETECTED
SODIUM SERPL-SCNC: 116 MMOL/L (ref 136–145)
SODIUM SERPL-SCNC: 122 MMOL/L (ref 136–145)
SODIUM UR-SCNC: 14 MMOL/L
SP GR UR REFRACTOMETRY: 1.01 (ref 1–1.03)
TROPONIN I SERPL HS-MCNC: 22 NG/L (ref 0–51)
TSH SERPL DL<=0.05 MIU/L-ACNC: 1.21 UIU/ML (ref 0.36–3.74)
URATE SERPL-MCNC: 4.1 MG/DL (ref 2.6–6)
UROBILINOGEN UR QL STRIP.AUTO: 0.1 EU/DL (ref 0.1–1)
WBC # BLD AUTO: 8.6 K/UL (ref 3.6–11)
WBC URNS QL MICRO: ABNORMAL /HPF (ref 0–4)

## 2024-02-03 PROCEDURE — 6360000002 HC RX W HCPCS: Performed by: INTERNAL MEDICINE

## 2024-02-03 PROCEDURE — 84443 ASSAY THYROID STIM HORMONE: CPT

## 2024-02-03 PROCEDURE — 80048 BASIC METABOLIC PNL TOTAL CA: CPT

## 2024-02-03 PROCEDURE — 83880 ASSAY OF NATRIURETIC PEPTIDE: CPT

## 2024-02-03 PROCEDURE — 2060000000 HC ICU INTERMEDIATE R&B

## 2024-02-03 PROCEDURE — 84550 ASSAY OF BLOOD/URIC ACID: CPT

## 2024-02-03 PROCEDURE — 99285 EMERGENCY DEPT VISIT HI MDM: CPT

## 2024-02-03 PROCEDURE — 93005 ELECTROCARDIOGRAM TRACING: CPT | Performed by: STUDENT IN AN ORGANIZED HEALTH CARE EDUCATION/TRAINING PROGRAM

## 2024-02-03 PROCEDURE — 84484 ASSAY OF TROPONIN QUANT: CPT

## 2024-02-03 PROCEDURE — 6370000000 HC RX 637 (ALT 250 FOR IP): Performed by: INTERNAL MEDICINE

## 2024-02-03 PROCEDURE — 85379 FIBRIN DEGRADATION QUANT: CPT

## 2024-02-03 PROCEDURE — 84300 ASSAY OF URINE SODIUM: CPT

## 2024-02-03 PROCEDURE — 80053 COMPREHEN METABOLIC PANEL: CPT

## 2024-02-03 PROCEDURE — 94761 N-INVAS EAR/PLS OXIMETRY MLT: CPT

## 2024-02-03 PROCEDURE — 87635 SARS-COV-2 COVID-19 AMP PRB: CPT

## 2024-02-03 PROCEDURE — 71045 X-RAY EXAM CHEST 1 VIEW: CPT

## 2024-02-03 PROCEDURE — 2580000003 HC RX 258: Performed by: INTERNAL MEDICINE

## 2024-02-03 PROCEDURE — 81001 URINALYSIS AUTO W/SCOPE: CPT

## 2024-02-03 PROCEDURE — 36415 COLL VENOUS BLD VENIPUNCTURE: CPT

## 2024-02-03 PROCEDURE — 82436 ASSAY OF URINE CHLORIDE: CPT

## 2024-02-03 PROCEDURE — 85025 COMPLETE CBC W/AUTO DIFF WBC: CPT

## 2024-02-03 PROCEDURE — 2580000003 HC RX 258: Performed by: STUDENT IN AN ORGANIZED HEALTH CARE EDUCATION/TRAINING PROGRAM

## 2024-02-03 PROCEDURE — 83935 ASSAY OF URINE OSMOLALITY: CPT

## 2024-02-03 PROCEDURE — 84133 ASSAY OF URINE POTASSIUM: CPT

## 2024-02-03 RX ORDER — SODIUM CHLORIDE 9 MG/ML
INJECTION, SOLUTION INTRAVENOUS PRN
Status: DISCONTINUED | OUTPATIENT
Start: 2024-02-03 | End: 2024-02-09 | Stop reason: HOSPADM

## 2024-02-03 RX ORDER — DEXAMETHASONE SODIUM PHOSPHATE 10 MG/ML
6 INJECTION, SOLUTION INTRAMUSCULAR; INTRAVENOUS EVERY 24 HOURS
Status: DISCONTINUED | OUTPATIENT
Start: 2024-02-03 | End: 2024-02-09 | Stop reason: HOSPADM

## 2024-02-03 RX ORDER — ONDANSETRON 2 MG/ML
4 INJECTION INTRAMUSCULAR; INTRAVENOUS EVERY 6 HOURS PRN
Status: DISCONTINUED | OUTPATIENT
Start: 2024-02-03 | End: 2024-02-09 | Stop reason: HOSPADM

## 2024-02-03 RX ORDER — ACETAMINOPHEN 325 MG/1
650 TABLET ORAL EVERY 6 HOURS PRN
Status: DISCONTINUED | OUTPATIENT
Start: 2024-02-03 | End: 2024-02-04

## 2024-02-03 RX ORDER — ONDANSETRON 4 MG/1
4 TABLET, ORALLY DISINTEGRATING ORAL EVERY 8 HOURS PRN
Status: DISCONTINUED | OUTPATIENT
Start: 2024-02-03 | End: 2024-02-09 | Stop reason: HOSPADM

## 2024-02-03 RX ORDER — SODIUM CHLORIDE 0.9 % (FLUSH) 0.9 %
5-40 SYRINGE (ML) INJECTION PRN
Status: DISCONTINUED | OUTPATIENT
Start: 2024-02-03 | End: 2024-02-09 | Stop reason: HOSPADM

## 2024-02-03 RX ORDER — POLYETHYLENE GLYCOL 3350 17 G/17G
17 POWDER, FOR SOLUTION ORAL DAILY PRN
Status: DISCONTINUED | OUTPATIENT
Start: 2024-02-03 | End: 2024-02-09 | Stop reason: HOSPADM

## 2024-02-03 RX ORDER — 0.9 % SODIUM CHLORIDE 0.9 %
1000 INTRAVENOUS SOLUTION INTRAVENOUS ONCE
Status: COMPLETED | OUTPATIENT
Start: 2024-02-03 | End: 2024-02-03

## 2024-02-03 RX ORDER — MAGNESIUM SULFATE IN WATER 40 MG/ML
2000 INJECTION, SOLUTION INTRAVENOUS PRN
Status: DISCONTINUED | OUTPATIENT
Start: 2024-02-03 | End: 2024-02-09 | Stop reason: HOSPADM

## 2024-02-03 RX ORDER — ACETAMINOPHEN 325 MG/1
650 TABLET ORAL EVERY 6 HOURS PRN
Status: DISCONTINUED | OUTPATIENT
Start: 2024-02-03 | End: 2024-02-09 | Stop reason: HOSPADM

## 2024-02-03 RX ORDER — SODIUM CHLORIDE 9 MG/ML
INJECTION, SOLUTION INTRAVENOUS CONTINUOUS
Status: DISPENSED | OUTPATIENT
Start: 2024-02-03 | End: 2024-02-05

## 2024-02-03 RX ORDER — ENOXAPARIN SODIUM 100 MG/ML
40 INJECTION SUBCUTANEOUS DAILY
Status: DISCONTINUED | OUTPATIENT
Start: 2024-02-04 | End: 2024-02-04

## 2024-02-03 RX ORDER — POTASSIUM CHLORIDE 20 MEQ/1
40 TABLET, EXTENDED RELEASE ORAL ONCE
Status: COMPLETED | OUTPATIENT
Start: 2024-02-03 | End: 2024-02-03

## 2024-02-03 RX ORDER — SODIUM CHLORIDE 0.9 % (FLUSH) 0.9 %
5-40 SYRINGE (ML) INJECTION EVERY 12 HOURS SCHEDULED
Status: DISCONTINUED | OUTPATIENT
Start: 2024-02-03 | End: 2024-02-09 | Stop reason: HOSPADM

## 2024-02-03 RX ORDER — SENNOSIDES A AND B 8.6 MG/1
1 TABLET, FILM COATED ORAL 2 TIMES DAILY PRN
Status: DISCONTINUED | OUTPATIENT
Start: 2024-02-03 | End: 2024-02-09 | Stop reason: HOSPADM

## 2024-02-03 RX ORDER — ACETAMINOPHEN 650 MG/1
650 SUPPOSITORY RECTAL EVERY 6 HOURS PRN
Status: DISCONTINUED | OUTPATIENT
Start: 2024-02-03 | End: 2024-02-09 | Stop reason: HOSPADM

## 2024-02-03 RX ORDER — ACETAMINOPHEN 650 MG/1
650 SUPPOSITORY RECTAL EVERY 6 HOURS PRN
Status: DISCONTINUED | OUTPATIENT
Start: 2024-02-03 | End: 2024-02-04

## 2024-02-03 RX ORDER — SODIUM CHLORIDE 0.9 % (FLUSH) 0.9 %
5-40 SYRINGE (ML) INJECTION EVERY 12 HOURS SCHEDULED
Status: DISCONTINUED | OUTPATIENT
Start: 2024-02-03 | End: 2024-02-04

## 2024-02-03 RX ADMIN — SODIUM CHLORIDE 1000 ML: 9 INJECTION, SOLUTION INTRAVENOUS at 11:17

## 2024-02-03 RX ADMIN — DEXAMETHASONE SODIUM PHOSPHATE 6 MG: 10 INJECTION INTRAMUSCULAR; INTRAVENOUS at 12:22

## 2024-02-03 RX ADMIN — SODIUM CHLORIDE, PRESERVATIVE FREE 10 ML: 5 INJECTION INTRAVENOUS at 12:26

## 2024-02-03 RX ADMIN — POTASSIUM CHLORIDE 40 MEQ: 1500 TABLET, EXTENDED RELEASE ORAL at 17:34

## 2024-02-03 RX ADMIN — SODIUM CHLORIDE: 9 INJECTION, SOLUTION INTRAVENOUS at 12:22

## 2024-02-03 NOTE — CONSULTS
NAME:  Lashon Vanegas   :   1945   MRN:   [id number]     ATTENDING: Rafael Ferrera MD  PCP:  Braxton Noland APRN - NP    Date/Time:  2/3/2024       Subjective:   REQUESTING PHYSICIAN:Rafael Ferrera MD  REASON FOR CONSULT:    Hyponatremia    History of presenting illness: Patient is an 79-year-old female with past medical history of hypertension, osteoarthritis presented to the emergency room with complaints of generalized weakness, cough and exertional shortness of breath.  Patient was apparently diagnosed with COVID-19 recently and used a course of Paxlovid, as her symptoms persisted she presented to the emergency room.  Initial labs done in the emergency room showed a low sodium 116, nephrology consultation is requested for further evaluation and management.   Patient seen in the emergency room, she is alert and awake answering simple questions appropriately, her family members at bedside.  She has history of hypertension and maintains a salt restricted diet, she has increased her p.o. fluid consumption secondary to her recent illness with COVID-19.  She has some history of heart disease and currently taking spironolactone as outpatient.  Review of last labs on 2023 showed up at sodium of 132.  No history of any chronic kidney disease.  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 10/4/2020    Eye problems     Gout     Hx of colonoscopy approx 2000    Hypertension     Mixed hyperlipidemia 10/4/2020    Nausea & vomiting     Osteopenia 10/4/2020    Urinary tract infection, site not specified 2012      Past Surgical History:   Procedure Laterality Date    COLONOSCOPY  2017    repeat 10yrs    CYST  restriction and use of spironolactone   No history of thiazide use  Hold spironolactone  Recommend to check urine random electrolytes, urine osmolality, uric acid level  Recommend to check a TSH level and random cortisol level to rule out any endocrine issues contributing to hyponatremia  Will start patient on normal saline  at 75 mL/hour  Recommend strict p.o. fluid restriction to 1200 mL per day  Will get a repeat stat BMP and continue sodium monitoring every 6 hours  Will continue to monitor sodium levels closely and adjust management as needed     2.  Hypokalemia: Recommend to add potassium chloride to IV fluids, will give p.o. supplementation of KCl 40 mEq 1 dose  Continue to monitor potassium levels and supplement as needed  Check a mag level tomorrow    3.  Shortness of breath/recently diagnosed COVID-19 infection  Still complaining of some cough and exertional shortness of breath  Initial chest x-ray was reported in negative for any infiltrates  Pulmonary consulted    4.  Hypertension: Stable blood pressures , not on any antiparkinson medications at this time  continue to monitor blood pressures       Addendum: Repeat sodium level is at 122, will decrease normal saline to 50 mill per hour continue to monitor sodium levels    Signed: Raffi Anaya MD

## 2024-02-03 NOTE — ED NOTES
ED TO INPATIENT SBAR HANDOFF    Patient Name: Lashon Vanegas   Preferred Name: Lashon  : 1945  79 y.o.   Family/Caregiver Present: no   Code Status Order: Full Code  PO Status: Regular  Telemetry Order:   C-SSRS: Risk of Suicide: No Risk  Sitter no   Restraints:     Sepsis Risk Score Sepsis Risk Score: 2.93    Situation  Chief Complaint   Patient presents with    Shortness of Breath    Fatigue    Positive For Covid-19     Brief Description of Patient's Condition: recently dx with covid, weak and short of breath, has nausea and vomiting   Mental Status: oriented and coherent  Arrived from:Home  Imaging:   XR CHEST PORTABLE   Final Result      No acute process on portable chest.           Abnormal labs:   Abnormal Labs Reviewed   COVID-19, RAPID - Abnormal; Notable for the following components:       Result Value    SARS-CoV-2, Rapid DETECTED (*)     All other components within normal limits   CBC WITH AUTO DIFFERENTIAL - Abnormal; Notable for the following components:    Neutrophils % 87 (*)     Lymphocytes % 4 (*)     Immature Granulocytes 1 (*)     Lymphocytes Absolute 0.4 (*)     All other components within normal limits   COMPREHENSIVE METABOLIC PANEL - Abnormal; Notable for the following components:    Sodium 116 (*)     Potassium 3.3 (*)     Chloride 78 (*)     Glucose 142 (*)     Calcium 8.4 (*)     Albumin 3.2 (*)     Albumin/Globulin Ratio 0.9 (*)     All other components within normal limits   BRAIN NATRIURETIC PEPTIDE - Abnormal; Notable for the following components:    NT Pro-BNP 4,443 (*)     All other components within normal limits   URINALYSIS WITH MICROSCOPIC - Abnormal; Notable for the following components:    Protein,  (*)     Blood, Urine Small (*)     All other components within normal limits       Background  Allergies:   Allergies   Allergen Reactions    Codeine Other (See Comments)    Morphine      Other reaction(s): Not Reported This Time    Hydrochlorothiazide Rash     History:    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 10/4/2020    Eye problems     Gout     Hx of colonoscopy approx 2000    Hypertension     Mixed hyperlipidemia 10/4/2020    Nausea & vomiting     Osteopenia 10/4/2020    Urinary tract infection, site not specified 7/2012       Assessment  Vitals: MEWS Score: 2  Level of Consciousness: Alert (0)   Vitals:    02/03/24 1400 02/03/24 1430 02/03/24 1500 02/03/24 1520   BP: (!) 149/71 136/79 (!) 145/82    Pulse: 91 (!) 108 87    Resp: 17 21 20    Temp:    99.8 °F (37.7 °C)   TempSrc:    Oral   SpO2: 95% 97% 98%    Weight:       Height:         Deterioration Index (DI): Deterioration Index: 29.02  Deterioration Index (DI) Interventions Performed:    O2 Flow Rate:    O2 Device:    Cardiac Rhythm:    Critical Lab Results: [unfilled]  Cultures: NA  NIH Score: NIH     Active LDA's:   Peripheral IV 02/03/24 Left Antecubital (Active)     Active Central Lines:                          Active Wounds:    Active [place]'s:    Active Feeding Tubes:      Administered Medications:   Medications   sodium chloride flush 0.9 % injection 5-40 mL (10 mLs IntraVENous Given 2/3/24 1226)   sodium chloride flush 0.9 % injection 5-40 mL (has no administration in time range)   0.9 % sodium chloride infusion (has no administration in time range)   magnesium sulfate 2000 mg in 50 mL IVPB premix (has no administration in time range)   enoxaparin (LOVENOX) injection 40 mg (has no administration in time range)   ondansetron (ZOFRAN-ODT) disintegrating tablet 4 mg (has no administration in time range)     Or   ondansetron (ZOFRAN) injection 4 mg (has no administration in time range)   polyethylene glycol (GLYCOLAX) packet 17 g (has no administration in time range)   acetaminophen (TYLENOL)  tablet 650 mg (has no administration in time range)     Or   acetaminophen (TYLENOL) suppository 650 mg (has no administration in time range)   0.9 % sodium chloride infusion ( IntraVENous New Bag 2/3/24 1222)   sodium chloride flush 0.9 % injection 5-40 mL (has no administration in time range)   sodium chloride flush 0.9 % injection 5-40 mL (has no administration in time range)   0.9 % sodium chloride infusion (has no administration in time range)   acetaminophen (TYLENOL) tablet 650 mg (has no administration in time range)     Or   acetaminophen (TYLENOL) suppository 650 mg (has no administration in time range)   dexAMETHasone (PF) (DECADRON) injection 6 mg (6 mg IntraVENous Given 2/3/24 1222)   senna (SENOKOT) tablet 8.6 mg (has no administration in time range)   sodium chloride 0.9 % bolus 1,000 mL (0 mLs IntraVENous Stopped 2/3/24 1230)     Last documented pain medication administration: NA  Pertinent or High Risk Medications/Drips: no   If Yes, please provide details: NA  Blood Product Administration: no  If Yes, please provide details: NA  Process Protocols/Bundles: NA    Recommendation  Incomplete STAT orders: NA  Overdue Medications: NA  Patient Belongings:    Additional Comments: Pt COVID POSITIVE  If any further questions, please call Sending RN at [phone]    Electronically signed by: Electronically signed by Luis Mcclain RN on 2/3/2024 at 3:21 PM

## 2024-02-03 NOTE — ED TRIAGE NOTES
Tested positive for COVID on Tuesday, thought she was getting better but now feels as if she is getting worse. Pt has no appetite, not eating or drinking, very weak. Pt also reports SOB, reports cough at night.

## 2024-02-03 NOTE — ED PROVIDER NOTES
[facility] EMERGENCY DEPT  EMERGENCY DEPARTMENT HISTORY AND PHYSICAL EXAM      Date: 2/3/2024  Patient Name: Lashon Vanegas  MRN: [id number]  YOB: 1945  Date of evaluation: 2/3/2024  Provider: Jose Maria Salinas MD   Note Started: 11:36 AM EST 2/3/24    HISTORY OF PRESENT ILLNESS     Chief Complaint   Patient presents with    Shortness of Breath    Fatigue    Positive For Covid-19       History Provided By: Patient    HPI: Lashon Vanegas is a 79 y.o. female presents to the emergency department for evaluation of generalized weakness fatigue, difficulty getting around her home for the last 2 to 3 days.  Patient was diagnosed with COVID-19 approximately 5 days ago was started on Paxlovid.  Patient states she has had significant abdominal discomfort nausea and diarrhea since then.  Has not eaten anything significant.  No noted any recurrent fevers, no trouble breathing, positive dry cough nonproductive.  Denies any chest pains or loss of consciousness.    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 10/4/2020    Eye problems     Gout     Hx of colonoscopy approx 2000    Hypertension     Mixed hyperlipidemia 10/4/2020    Nausea & vomiting     Osteopenia 10/4/2020    Urinary tract infection, site not specified 7/2012       Past Surgical History:  Past Surgical History:   Procedure Laterality Date    COLONOSCOPY  05/18/2017    repeat 10yrs    CYST REMOVAL  03/15/2022    HYSTERECTOMY (CERVIX STATUS UNKNOWN)  2000    HYSTERECTOMY, VAGINAL  2000    re: menorrhagia plus \"bladder tack\"    MENISCECTOMY  1972; 1973    twice - right knee    ORTHOPEDIC SURGERY  2000    right rotator cuff repair    TONSILLECTOMY  1954    TOTAL KNEE ARTHROPLASTY      TOTAL KNEE  Conjunctiva/sclera: Conjunctivae normal.      Pupils: Pupils are equal, round, and reactive to light.   Cardiovascular:      Rate and Rhythm: Normal rate and regular rhythm.      Heart sounds: Normal heart sounds.   Pulmonary:      Effort: Pulmonary effort is normal.      Breath sounds: Normal breath sounds.   Abdominal:      General: Abdomen is flat. Bowel sounds are normal. There is no distension.      Palpations: Abdomen is soft.      Tenderness: There is no abdominal tenderness.   Musculoskeletal:         General: No swelling, tenderness or signs of injury. Normal range of motion.      Cervical back: Normal range of motion and neck supple.   Skin:     General: Skin is warm and dry.      Capillary Refill: Capillary refill takes less than 2 seconds.   Neurological:      General: No focal deficit present.      Mental Status: She is alert and oriented to person, place, and time. Mental status is at baseline.      Cranial Nerves: No cranial nerve deficit.      Sensory: No sensory deficit.      Motor: No weakness.   Psychiatric:         Mood and Affect: Mood normal.         Behavior: Behavior normal.           SCREENINGS                  LAB, EKG AND DIAGNOSTIC RESULTS   Labs:  Recent Results (from the past 12 hour(s))   CBC with Auto Differential    Collection Time: 02/03/24 10:41 AM   Result Value Ref Range    WBC 8.6 3.6 - 11.0 K/uL    RBC 4.36 3.80 - 5.20 M/uL    Hemoglobin 12.6 11.5 - 16.0 g/dL    Hematocrit 35.5 35.0 - 47.0 %    MCV 81.4 80.0 - 99.0 FL    MCH 28.9 26.0 - 34.0 PG    MCHC 35.5 30.0 - 36.5 g/dL    RDW 13.9 11.5 - 14.5 %    Platelets 185 150 - 400 K/uL    MPV 12.6 8.9 - 12.9 FL    Nucleated RBCs 0.0 0.0  WBC    nRBC 0.00 0.00 - 0.01 K/uL    Neutrophils % 87 (H) 32 - 75 %    Lymphocytes % 4 (L) 12 - 49 %    Monocytes % 8 5 - 13 %    Eosinophils % 0 0 - 7 %    Basophils % 0 0 - 1 %    Immature Granulocytes 1 (H) 0 - 0.5 %    Neutrophils Absolute 7.5 1.8 - 8.0 K/UL    Lymphocytes Absolute 0.4  and DIFFERENTIAL DIAGNOSIS/MDM   11:41 AM DDx, ED Course, and Reassessment: 79-year-old female, history of hypertension, recent COVID diagnosis, presents to the emergency department for generalized weakness, difficulty ambulating throughout house due to generalized weakness, decreased p.o. intake.    Physical shows elderly female, ill-appearing however in no distress mild tachycardia noted, afebrile, normotensive.  Unremarkable physical exam otherwise nonfocal neurological exam.    Differential includes dehydration, COVID-pneumonia, electrolyte abnormality,    Will draw basic lab work, UA, chest x-ray, 1 L IV normal saline ordered    Records Reviewed (source and summary of external notes): Prior medical records and Nursing notes    Vitals:    Vitals:    02/03/24 1031   BP: (!) 145/76   Pulse: (!) 106   Resp: 18   Temp: 98.9 °F (37.2 °C)   TempSrc: Oral   SpO2: 96%   Weight: 63.5 kg (140 lb)   Height: 1.626 m (5' 4\")        ED COURSE  ED Course as of 02/03/24 1141   Sat Feb 03, 2024   1137 Patient's lab work resulting metabolic panel significant for severe hyponatremia sodium 116.  BUN 14 creatinine 0.9, chlorides 78.  1 L normal saline has already been ordered.  CBC shows no leukocytosis stable H&H, UA and urine electrolyte panel ordered.  Will admit patient for generalized weakness, hyponatremia [PZ]   1141 I discussed case with on-call hospitalist, will admit patient for generalized weakness, hyponatremia. [PZ]      ED Course User Index  [PZ] Jose Maria Salinas MD       Clinical Management Tools:  Not Applicable    Sepsis Reassessment: Sepsis reassessment not applicable    Disposition Considerations (Tests not done, Shared Decision Making, Pt Expectation of Test or Treatment.): Not Applicable    Patient was given the following medications:  Medications   sodium chloride 0.9 % bolus 1,000 mL (1,000 mLs IntraVENous New Bag 2/3/24 1117)       CONSULTS: (Who and What was discussed)  None     Social Determinants  affecting Dx or Tx: None    Smoking Cessation: Not Applicable    PROCEDURES   Unless otherwise noted above, none.  Procedures      CRITICAL CARE TIME   CRITICAL CARE NOTE :    11:44 AM    IMPENDING DETERIORATION -Metabolic  ASSOCIATED RISK FACTORS - Metabolic changes  MANAGEMENT- Bedside Assessment and Supervision of Care  INTERPRETATION -  ECG and Blood Pressure  INTERVENTIONS - Metabolic interventions  CASE REVIEW - Hospitalist/Intensivist  TREATMENT RESPONSE -Stable  PERFORMED BY - Self    NOTES:  I have spent 30 minutes of critical care time involved in lab review, consultations with specialist, family decision- making, bedside attention and documentation. Time is exclusive of EKG interpretation, imaging interpretation and separately billed procedures.  During this entire length of time I was immediately available to the patient.  Jose Maria Salinas MD    FINAL IMPRESSION   No diagnosis found.      DISPOSITION/PLAN   DISPOSITION      Admit Note: Pt is being admitted by hospitalist team. The results of their tests and reason(s) for their admission have been discussed with pt and/or available family. They convey agreement and understanding for the need to be admitted and for the admission diagnosis.     PATIENT REFERRED TO:  No follow-up provider specified.      DISCHARGE MEDICATIONS:     Medication List        ASK your doctor about these medications      amLODIPine-benazepril 10-40 MG per capsule  Commonly known as: LOTREL     APPLE CIDER VINEGAR PO     Astaxanthin 4 MG Caps     CALCIUM-VITAMIN D3 PO     diclofenac 75 MG EC tablet  Commonly known as: VOLTAREN  TAKE ONE TABLET BY MOUTH TWICE A DAY AS NEEDED FOR PAIN     diclofenac sodium 1 % Gel  Commonly known as: VOLTAREN     fish oil 1000 MG capsule     furosemide 40 MG tablet  Commonly known as: LASIX     metoprolol succinate 100 MG extended release tablet  Commonly known as: TOPROL XL     pravastatin 20 MG tablet  Commonly known as: PRAVACHOL     prednisoLONE

## 2024-02-03 NOTE — PROGRESS NOTES
2 person skin assessment performed with FELICIANO Posada. Pt's skin is free from redness and clean, dry, and intact.

## 2024-02-03 NOTE — CONSULTS
Pulmonary and Critical Care Consult    Subjective:   Consult Note: 2/3/2024 @no control      Chief Complaint:   Chief Complaint   Patient presents with    Shortness of Breath    Fatigue    Positive For Covid-19        This patient has been seen and evaluated at the request of Dr. Ferrera    79-year-old  lady  I am asked to see for acute respiratory failure with hypoxia and COVID infection and severe hyponatremia    Lifetime non-smoker  Denies any known history of lung problems  Not on home oxygen therapy    Patient presents with generalized weakness, tiredness, increasing cough, shortness of breath, diarrhea, nausea and poor oral intake getting worse over the duration of 1 week.  She is accompanied by her daughter and son-in-law.    She recalls that she was exposed to COVID approximately a week ago and has been coughing a lot.  She has not been taking her home medications of Lasix and spironolactone.  Usually she is quite active and is in good health as per her daughter.  She has been quite dizzy and unable to ambulate.  She has been mainly consuming some fluids the past few days.  Very nauseous and has had diarrhea.  She continues to take diclofenac 2 tablets daily for arthritis pains    Evaluation in the ED showed a sodium of 116, elevated BNP, hypoxia     Chest x-ray shows clear lung fields    Patient on nasal cannula oxygen    Review of Systems:  Pertinent items are noted in HPI.    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 10/4/2020    Eye problems     Gout     Hx of colonoscopy approx 2000    Hypertension     Mixed hyperlipidemia 10/4/2020    Nausea & vomiting     Osteopenia 10/4/2020    Urinary tract infection, site not specified 7/2012     Past Surgical History:   Procedure  Or    acetaminophen (TYLENOL) suppository 650 mg  650 mg Rectal Q6H PRN Rafael Ferrera MD        0.9 % sodium chloride infusion   IntraVENous Continuous Rafael Ferrera MD 75 mL/hr at 02/03/24 1222 New Bag at 02/03/24 1222    sodium chloride flush 0.9 % injection 5-40 mL  5-40 mL IntraVENous 2 times per day Rafael Ferrera MD        sodium chloride flush 0.9 % injection 5-40 mL  5-40 mL IntraVENous PRN Rafael Ferrera MD        0.9 % sodium chloride infusion   IntraVENous PRN Rafael Ferrera MD        acetaminophen (TYLENOL) tablet 650 mg  650 mg Oral Q6H PRN Rafael Ferrera MD        Or    acetaminophen (TYLENOL) suppository 650 mg  650 mg Rectal Q6H PRN Rafael Ferrera MD        dexAMETHasone (PF) (DECADRON) injection 6 mg  6 mg IntraVENous Q24H Rafael Ferrera MD   6 mg at 02/03/24 1222    senna (SENOKOT) tablet 8.6 mg  1 tablet Oral BID PRN Rafael Ferrera MD         Current Outpatient Medications   Medication Sig Dispense Refill    diclofenac (VOLTAREN) 75 MG EC tablet TAKE ONE TABLET BY MOUTH TWICE A DAY AS NEEDED FOR PAIN 180 tablet 1    APPLE CIDER VINEGAR PO Take by mouth      Misc Natural Products (YUMVS BEET ROOT-TART CHERRY PO) Take by mouth      Calcium Carbonate-Vitamin D (CALCIUM-VITAMIN D3 PO) Take by mouth      TURMERIC PO Take by mouth      amLODIPine-benazepril (LOTREL) 10-40 MG per capsule Take 1 capsule by mouth daily      Astaxanthin 4 MG CAPS Take by mouth      diclofenac sodium (VOLTAREN) 1 % GEL Apply 2 g topically 4 times daily      furosemide (LASIX) 40 MG tablet Take 1 tablet by mouth daily      metoprolol succinate (TOPROL XL) 100 MG extended release tablet Take 1 tablet by mouth daily      Omega-3 Fatty Acids (FISH OIL) 1000 MG capsule Take 2 capsules by mouth 2 times daily      pravastatin (PRAVACHOL) 20 MG tablet Take 1 tablet by mouth      prednisoLONE acetate (PRED FORTE) 1 % ophthalmic suspension ceived the following from [facility]  studies, imaging studies, and vital signs to date as well as treatment rendered and patient's response to those treatments. In addition, prior medical, surgical and relevant social and family histories were reviewed.     Time spent more than 60 minutes in direct patient care with no overlap reviewing results and records, decision making, and answering questions.      DENICE BOSE MD  [facility] (PAT)  2/3/2024  1:26 PM

## 2024-02-03 NOTE — H&P
History & Physical    Primary Care Provider: Braxton Noland APRN - NP  Source of Information: Patient/family     History of Presenting Illness:   Lashon Vanegas is a 79 y.o. female who presents with generalized weakness, tiredness, increasing cough, shortness of breath, diarrhea, nausea and poor oral intake getting worse over the duration of 1 week.  She is accompanied by her daughter and son-in-law.  Evaluation in the ED showed a sodium of 116, elevated BNP, hypoxia and clear chest x-ray.  She was recommended for admission by ED attending Dr. Salinas  At time of my evaluation patient states that she her mouth is dry and she would like to drink something.  She recalls that she was exposed to COVID approximately a week ago and has been coughing a lot.  She has not been taking her home medications of Lasix and spironolactone.  Usually she is quite active and is in good health as per her daughter.  She has been quite dizzy and unable to ambulate.  She has been mainly consuming some fluids the past few days.  Very nauseous and has had diarrhea.  She continues to take diclofenac 2 tablets daily for arthritis pains     Review of Systems:  Pertinent items are noted in the History of Present Illness.     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 10/4/2020    Eye problems     Gout     Hx of colonoscopy approx 2000    Hypertension     Mixed hyperlipidemia 10/4/2020    Nausea & vomiting     Osteopenia 10/4/2020    Urinary tract infection, site not specified 7/2012        Past Surgical History:   Procedure Laterality Date    COLONOSCOPY  05/18/2017    repeat 10yrs    CYST REMOVAL  03/15/2022    HYSTERECTOMY (CERVIX STATUS UNKNOWN)  2000    HYSTERECTOMY, VAGINAL  2000    re: menorrhagia plus  8.4 (L) 8.5 - 10.1 mg/dL    Total Bilirubin 0.8 0.2 - 1.0 mg/dL    AST 25 15 - 37 U/L    ALT 29 12 - 78 U/L    Alk Phosphatase 82 45 - 117 U/L    Total Protein 6.7 6.4 - 8.2 g/dL    Albumin 3.2 (L) 3.5 - 5.0 g/dL    Globulin 3.5 2.0 - 4.0 g/dL    Albumin/Globulin Ratio 0.9 (L) 1.1 - 2.2     Troponin    Collection Time: 02/03/24 10:41 AM   Result Value Ref Range    Troponin, High Sensitivity 22 0 - 51 ng/L   COVID-19, Rapid    Collection Time: 02/03/24 10:41 AM    Specimen: Nasopharyngeal   Result Value Ref Range    SARS-CoV-2, Rapid DETECTED (A) Not Detected     Brain Natriuretic Peptide    Collection Time: 02/03/24 10:41 AM   Result Value Ref Range    NT Pro-BNP 4,443 (H) <450 pg/mL         Imaging:   XR CHEST PORTABLE   Final Result      No acute process on portable chest.                 Assessment:       Severe hyponatremia-sodium 116  Mild hypokalemia  COVID-19 pneumonia  Dehydration  Increase generalized weakness/lethargy  History of hypertension  History of chronic CHF?  Cardiologist Dr. Zhu  History of dyslipidemia  Severe osteoarthritis on daily NSAID therapy    Discussion/Medical Decision Making: Patient with numerous medical comorbidities, each with increased risk for mortality and morbidity if left untreated.  Patient requires medications with high risk of toxicity and need for intensive monitoring. I have reviewed patient's presenting subjective and objective findings, as well as all laboratory studies, imaging studies, and vital signs to date as well as treatment rendered and patient's response to those treatments.  In addition, prior medical, surgical and relevant social and family histories were reviewed.    Plan:     She is already received 1 L of normal saline in the emergency room.  Will obtain updated sodium.  Consulted nephrology  Urine sodium/urine osmolality and other workup send  Either SIADH or psychogenic polydipsia  Will monitor potassium and correct accordingly.  Will start Decadron for

## 2024-02-04 ENCOUNTER — APPOINTMENT (OUTPATIENT)
Facility: HOSPITAL | Age: 79
End: 2024-02-04
Attending: INTERNAL MEDICINE
Payer: MEDICARE

## 2024-02-04 LAB
25(OH)D3 SERPL-MCNC: 48.2 NG/ML (ref 30–100)
ALBUMIN SERPL-MCNC: 3 G/DL (ref 3.5–5)
ANION GAP SERPL CALC-SCNC: 7 MMOL/L (ref 5–15)
BUN SERPL-MCNC: 12 MG/DL (ref 6–20)
BUN/CREAT SERPL: 21 (ref 12–20)
CA-I BLD-MCNC: 8.3 MG/DL (ref 8.5–10.1)
CHLORIDE SERPL-SCNC: 94 MMOL/L (ref 97–108)
CO2 SERPL-SCNC: 23 MMOL/L (ref 21–32)
CREAT SERPL-MCNC: 0.58 MG/DL (ref 0.55–1.02)
CRP SERPL-MCNC: 4.45 MG/DL (ref 0–0.3)
ECHO BSA: 1.69 M2
EKG ATRIAL RATE: 105 BPM
EKG DIAGNOSIS: NORMAL
EKG Q-T INTERVAL: 356 MS
EKG QRS DURATION: 90 MS
EKG QTC CALCULATION (BAZETT): 435 MS
EKG R AXIS: 50 DEGREES
EKG T AXIS: 36 DEGREES
EKG VENTRICULAR RATE: 90 BPM
GLUCOSE SERPL-MCNC: 138 MG/DL (ref 65–100)
MAGNESIUM SERPL-MCNC: 2 MG/DL (ref 1.6–2.4)
PHOSPHATE SERPL-MCNC: 2.2 MG/DL (ref 2.6–4.7)
POTASSIUM SERPL-SCNC: 3.7 MMOL/L (ref 3.5–5.1)
SODIUM SERPL-SCNC: 124 MMOL/L (ref 136–145)

## 2024-02-04 PROCEDURE — 80069 RENAL FUNCTION PANEL: CPT

## 2024-02-04 PROCEDURE — 6370000000 HC RX 637 (ALT 250 FOR IP): Performed by: NURSE PRACTITIONER

## 2024-02-04 PROCEDURE — 2580000003 HC RX 258: Performed by: INTERNAL MEDICINE

## 2024-02-04 PROCEDURE — 93005 ELECTROCARDIOGRAM TRACING: CPT | Performed by: NURSE PRACTITIONER

## 2024-02-04 PROCEDURE — 86140 C-REACTIVE PROTEIN: CPT

## 2024-02-04 PROCEDURE — 6370000000 HC RX 637 (ALT 250 FOR IP): Performed by: INTERNAL MEDICINE

## 2024-02-04 PROCEDURE — 6360000002 HC RX W HCPCS: Performed by: INTERNAL MEDICINE

## 2024-02-04 PROCEDURE — 2060000000 HC ICU INTERMEDIATE R&B

## 2024-02-04 PROCEDURE — 82306 VITAMIN D 25 HYDROXY: CPT

## 2024-02-04 PROCEDURE — 93970 EXTREMITY STUDY: CPT

## 2024-02-04 PROCEDURE — 36415 COLL VENOUS BLD VENIPUNCTURE: CPT

## 2024-02-04 PROCEDURE — 83735 ASSAY OF MAGNESIUM: CPT

## 2024-02-04 RX ORDER — GUAIFENESIN 600 MG/1
600 TABLET, EXTENDED RELEASE ORAL 2 TIMES DAILY
Status: DISCONTINUED | OUTPATIENT
Start: 2024-02-04 | End: 2024-02-09 | Stop reason: HOSPADM

## 2024-02-04 RX ORDER — DEXTROMETHORPHAN POLISTIREX 30 MG/5ML
30 SUSPENSION ORAL EVERY 12 HOURS SCHEDULED
Status: DISCONTINUED | OUTPATIENT
Start: 2024-02-04 | End: 2024-02-09 | Stop reason: HOSPADM

## 2024-02-04 RX ADMIN — GUAIFENESIN 600 MG: 600 TABLET, EXTENDED RELEASE ORAL at 21:52

## 2024-02-04 RX ADMIN — GUAIFENESIN 600 MG: 600 TABLET, EXTENDED RELEASE ORAL at 13:16

## 2024-02-04 RX ADMIN — DEXAMETHASONE SODIUM PHOSPHATE 6 MG: 10 INJECTION INTRAMUSCULAR; INTRAVENOUS at 11:57

## 2024-02-04 RX ADMIN — DILTIAZEM HYDROCHLORIDE 30 MG: 30 TABLET, FILM COATED ORAL at 15:17

## 2024-02-04 RX ADMIN — Medication 30 MG: at 21:52

## 2024-02-04 RX ADMIN — DILTIAZEM HYDROCHLORIDE 30 MG: 30 TABLET, FILM COATED ORAL at 21:57

## 2024-02-04 RX ADMIN — SODIUM CHLORIDE: 9 INJECTION, SOLUTION INTRAVENOUS at 06:00

## 2024-02-04 RX ADMIN — APIXABAN 5 MG: 5 TABLET, FILM COATED ORAL at 15:17

## 2024-02-04 RX ADMIN — SODIUM CHLORIDE, PRESERVATIVE FREE 10 ML: 5 INJECTION INTRAVENOUS at 11:56

## 2024-02-04 RX ADMIN — ENOXAPARIN SODIUM 40 MG: 100 INJECTION SUBCUTANEOUS at 09:00

## 2024-02-04 RX ADMIN — APIXABAN 5 MG: 5 TABLET, FILM COATED ORAL at 21:52

## 2024-02-04 RX ADMIN — SODIUM CHLORIDE: 9 INJECTION, SOLUTION INTRAVENOUS at 21:53

## 2024-02-04 NOTE — CARE COORDINATION
02/04/24 1731   Service Assessment   Patient Orientation Alert and Oriented   Cognition Alert   History Provided By Child/Family   Primary Caregiver Self   Accompanied By/Relationship Dia@ [phone]   Support Systems Children   PCP Verified by CM Yes  (Braxton Noland)   Last Visit to PCP Within last 6 months   Prior Functional Level Independent in ADLs/IADLs   Current Functional Level Independent in ADLs/IADLs   Can patient return to prior living arrangement Yes   Ability to make needs known: Good   Family able to assist with home care needs: Yes   Would you like for me to discuss the discharge plan with any other family members/significant others, and if so, who? Yes  (consent for Dia)   Financial Resources Medicare  (BCBS)   Community Resources None     Spoke with the pts daughter Ella in the [place] with pt consent.   Dia stated ind with all adls and iadls.  The pt drives and is very active.  Stated she [place] to [place] home at [place].  Advance Care Planning     General Advance Care Planning (ACP) Conversation    Date of Conversation: 2/3/2024  Conducted with: Patient with Decision Making Capacity    Healthcare Decision Maker:    Primary Decision Maker: RominaLuis - Child - [phone]  Click here to complete Healthcare Decision Makers including selection of the Healthcare Decision Maker Relationship (ie \"Primary\").   Today we documented Decision Maker(s) consistent with Legal Next of Kin hierarchy.    Content/Action Overview:  Has ACP document(s) on file - reflects the patient's care preferences  Reviewed DNR/DNI and patient elects Full Code (Attempt Resuscitation)        Length of Voluntary ACP Conversation in minutes:  <16 minutes (Non-Billable)    Natalya Goodwin RN

## 2024-02-04 NOTE — PROGRESS NOTES
Pulmonary and Critical Care progress note    Subjective:   Consult Note: 2/4/2024 @no control      Chief Complaint:   Chief Complaint   Patient presents with    Shortness of Breath    Fatigue    Positive For Covid-19        This patient has been seen and evaluated at the request of Dr. Ferrera    Patient seen and examined  Overnight events noted    Lying in bed comfortably  Awake and alert  On room air  No acute distress  Daughter at bedside    Review of Systems:  Pertinent items are noted in HPI.    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 10/4/2020    Eye problems     Gout     Hx of colonoscopy approx 2000    Hypertension     Mixed hyperlipidemia 10/4/2020    Nausea & vomiting     Osteopenia 10/4/2020    Urinary tract infection, site not specified 7/2012     Past Surgical History:   Procedure Laterality Date    COLONOSCOPY  05/18/2017    repeat 10yrs    CYST REMOVAL  03/15/2022    HYSTERECTOMY (CERVIX STATUS UNKNOWN)  2000    HYSTERECTOMY, VAGINAL  2000    re: menorrhagia plus \"bladder tack\"    MENISCECTOMY  1972; 1973    twice - right knee    ORTHOPEDIC SURGERY  2000    right rotator cuff repair    TONSILLECTOMY  1954    TOTAL KNEE ARTHROPLASTY      TOTAL KNEE ARTHROPLASTY  04/2008    left - unicondylar - orthopaed.: Dr Shiva Chin      Family History   Problem Relation Age of Onset    Pseudochol. Deficiency Neg Hx     Post-op Nausea/Vomiting Neg Hx     Malig Hypertherm Neg Hx     Post-op Cognitive Dysfunction Neg Hx     Emergence Delirium Neg Hx     Delayed Awakening Other     Breast Cancer Sister     Cancer Sister         breast, ovary    Cancer Father      Social History     Tobacco Use    Smoking status: Never    Smokeless tobacco: Never   Substance Use Topics    Alcohol use: No  97 - 108 mmol/L    CO2 23 21 - 32 mmol/L    Anion Gap 7 5 - 15 mmol/L    Glucose 138 (H) 65 - 100 mg/dL    BUN 12 6 - 20 mg/dL    Creatinine 0.58 0.55 - 1.02 mg/dL    Bun/Cre Ratio 21 (H) 12 - 20      Est, Glom Filt Rate >60 >60 ml/min/1.73m2    Calcium 8.3 (L) 8.5 - 10.1 mg/dL    Phosphorus 2.2 (L) 2.6 - 4.7 mg/dL    Albumin 3.0 (L) 3.5 - 5.0 g/dL   C-Reactive Protein    Collection Time: 02/04/24  5:26 AM   Result Value Ref Range    CRP 4.45 (H) 0.00 - 0.30 mg/dL   Vitamin D 25 Hydroxy    Collection Time: 02/04/24  5:26 AM   Result Value Ref Range    Vit D, 25-Hydroxy 48.2 30 - 100 ng/mL   Vascular duplex lower extremity venous bilateral    Collection Time: 02/04/24 10:36 AM   Result Value Ref Range    Body Surface Area 1.69 m2   Magnesium    Collection Time: 02/04/24  1:30 PM   Result Value Ref Range    Magnesium 2.0 1.6 - 2.4 mg/dL       Vascular duplex lower extremity venous bilateral         XR CHEST PORTABLE   Final Result      No acute process on portable chest.             [unfilled]      Assessment:     1.  Acute respiratory failure with hypoxia  2.  COVID-19 infection  3.  Severe hyponatremia  4.  Dehydration  5.  Generalized weakness  6.  Possible CHF  7.  Hypertension    Plan:     Gradual improvement in clinical status    Currently on room air  Will use supplemental oxygen as needed to keep saturation above 92%    Patient has COVID-19 infection  Already received Paxlovid  Chest x-ray mostly clear  continue dexamethasone  Check D-dimer  If elevated may need CTA versus anticoagulation given hypoxia and clear chest x-ray  NT proBNP 4000 but patient does not appear clinically volume overloaded and chest x-ray is clear  Holding Lasix and spironolactone for now    Severe hyponatremia with sodium of 116  Possibly related to psychogenic polydipsia or SIADH  Await nephrology input  Await workup including urine sodium, urine osmolarity  Intake and output charting  Check electrolytes and replace as needed  Monitor  changes    DVT and GI prophylaxis    Questions of patient were answered at bedside in detail  Case discussed in detail with RN, RT, and care team  Thank you for involving me in the care of this patient  I will follow with you closely during hospitalization    Time spent more than 30 minutes in direct patient care with no overlap reviewing results and records, decision making, and answering questions.      DENICE BOSE MD  [facility] ([facility])  2/4/2024  2:47 PM

## 2024-02-04 NOTE — PROGRESS NOTES
Hospitalist Progress Note               Daily Progress Note: 2/4/2024      Chief complaint:   Chief Complaint   Patient presents with    Shortness of Breath    Fatigue    Positive For Covid-19        Subjective:   Hospital course to date:  Lashon Vanegas is a 79 y.o. female presents to the emergency department for evaluation of generalized weakness fatigue, difficulty getting around her home for the last 2 to 3 days.  Patient was diagnosed with COVID-19 approximately 5 days ago was started on Paxlovid.  Patient states she has had significant abdominal discomfort nausea and diarrhea since then.  Has not eaten anything significant.  No noted any recurrent fevers, no trouble breathing, positive dry cough nonproductive.  Denies any chest pains or loss of consciousness. She was transferred from the ER to the 4th floor on 2/4. Along with positive COVID test she tested positive for C.diff on 2/3    --------  Patient is seen today for follow-up.  Patient was seen while she sat up in bed 93% on room air. She reported overall improved respiratory symptoms despite persistent diarrhea. Chest x-ray is clear and without acute process. Patient has been seen by pulmonology and nephrology and will follow up appropriately.       Medications reviewed  Current Facility-Administered Medications   Medication Dose Route Frequency    sodium chloride flush 0.9 % injection 5-40 mL  5-40 mL IntraVENous 2 times per day    sodium chloride flush 0.9 % injection 5-40 mL  5-40 mL IntraVENous PRN    0.9 % sodium chloride infusion   IntraVENous PRN    magnesium sulfate 2000 mg in 50 mL IVPB premix  2,000 mg IntraVENous PRN    enoxaparin (LOVENOX) injection 40 mg  40 mg SubCUTAneous Daily    ondansetron (ZOFRAN-ODT) disintegrating tablet 4 mg  4 mg Oral Q8H PRN    Or    ondansetron (ZOFRAN) injection 4 mg  4 mg IntraVENous Q6H PRN    polyethylene glycol (GLYCOLAX) packet 17 g  17 g Oral Daily PRN    acetaminophen (TYLENOL) tablet 650 mg  650 mg  UA Few Few /lpf    BACTERIA, URINE Negative Negative /hpf    Mucus, UA Negative Negative /lpf   Electrolytes urine random    Collection Time: 02/03/24  1:24 PM   Result Value Ref Range    SODIUM, RANDOM URINE 14 mmol/L    POTASSIUM, RANDOM URINE 14 mmol/L    Chloride <10 mmol/L   Osmolality, Urine    Collection Time: 02/03/24  1:24 PM   Result Value Ref Range    Osmolality, Ur 200 MOSM/kg H2O   D-Dimer, Quantitative    Collection Time: 02/03/24  4:01 PM   Result Value Ref Range    D-Dimer, Quant 1.58 (H) <0.50 ug/ml(FEU)   Basic Metabolic Panel    Collection Time: 02/03/24  4:01 PM   Result Value Ref Range    Sodium 122 (L) 136 - 145 mmol/L    Potassium 3.2 (L) 3.5 - 5.1 mmol/L    Chloride 88 (L) 97 - 108 mmol/L    CO2 26 21 - 32 mmol/L    Anion Gap 8 5 - 15 mmol/L    Glucose 127 (H) 65 - 100 mg/dL    BUN 12 6 - 20 mg/dL    Creatinine 0.74 0.55 - 1.02 mg/dL    Bun/Cre Ratio 16 12 - 20      Est, Glom Filt Rate >60 >60 ml/min/1.73m2    Calcium 8.3 (L) 8.5 - 10.1 mg/dL   Renal Function Panel    Collection Time: 02/04/24  5:26 AM   Result Value Ref Range    Sodium 124 (L) 136 - 145 mmol/L    Potassium 3.7 3.5 - 5.1 mmol/L    Chloride 94 (L) 97 - 108 mmol/L    CO2 23 21 - 32 mmol/L    Anion Gap 7 5 - 15 mmol/L    Glucose 138 (H) 65 - 100 mg/dL    BUN 12 6 - 20 mg/dL    Creatinine 0.58 0.55 - 1.02 mg/dL    Bun/Cre Ratio 21 (H) 12 - 20      Est, Glom Filt Rate >60 >60 ml/min/1.73m2    Calcium 8.3 (L) 8.5 - 10.1 mg/dL    Phosphorus 2.2 (L) 2.6 - 4.7 mg/dL    Albumin 3.0 (L) 3.5 - 5.0 g/dL   C-Reactive Protein    Collection Time: 02/04/24  5:26 AM   Result Value Ref Range    CRP 4.45 (H) 0.00 - 0.30 mg/dL       XR CHEST PORTABLE   Final Result      No acute process on portable chest.         Vascular duplex lower extremity venous bilateral    (Results Pending)          Discussion/MDM:     [] High (any 2)    A. Problems (any 1)  [] Acute/Chronic Illness/injury posing threat to life or bodily function:    [x] Severe

## 2024-02-04 NOTE — PROGRESS NOTES
Hospitalist Progress Note               Daily Progress Note: 2/4/2024      Chief complaint:   Chief Complaint   Patient presents with    Shortness of Breath    Fatigue    Positive For Covid-19        Subjective:   Hospital course to date:  Lashon Vanegas is a 79 y.o. female presents to the emergency department for evaluation of generalized weakness fatigue, difficulty getting around her home for the last 2 to 3 days.  Patient was diagnosed with COVID-19 approximately 5 days ago was started on Paxlovid.  Patient states she has had significant abdominal discomfort nausea and diarrhea since then.  Has not eaten anything significant.  No noted any recurrent fevers, no trouble breathing, positive dry cough nonproductive.  Denies any chest pains or loss of consciousness. She was transferred from the ER to the 4th floor on 2/4. Along with positive COVID test she tested positive for C.diff on 2/3    --------  Patient is seen today for follow-up.  Patient was seen while she sat up in bed 93% on room air. She reported overall improved respiratory symptoms despite persistent diarrhea. Chest x-ray is clear and without acute process. Patient has been seen by pulmonology and nephrology and will follow up appropriately.       Medications reviewed  Current Facility-Administered Medications   Medication Dose Route Frequency    sodium chloride flush 0.9 % injection 5-40 mL  5-40 mL IntraVENous 2 times per day    sodium chloride flush 0.9 % injection 5-40 mL  5-40 mL IntraVENous PRN    0.9 % sodium chloride infusion   IntraVENous PRN    magnesium sulfate 2000 mg in 50 mL IVPB premix  2,000 mg IntraVENous PRN    enoxaparin (LOVENOX) injection 40 mg  40 mg SubCUTAneous Daily    ondansetron (ZOFRAN-ODT) disintegrating tablet 4 mg  4 mg Oral Q8H PRN    Or    ondansetron (ZOFRAN) injection 4 mg  4 mg IntraVENous Q6H PRN    polyethylene glycol (GLYCOLAX) packet 17 g  17 g Oral Daily PRN    acetaminophen (TYLENOL) tablet 650 mg  650 mg  02/04/24  0526   * 122* 124*   K 3.3* 3.2* 3.7   CL 78* 88* 94*   CO2 30 26 23   GLUCOSE 142* 127* 138*   BUN 14 12 12   CREATININE 0.89 0.74 0.58   CALCIUM 8.4* 8.3* 8.3*   PHOS  --   --  2.2*   LABALBU 3.2*  --  3.0*   BILITOT 0.8  --   --    AST 25  --   --    ALT 29  --   --        No results for input(s): \"PHART\", \"CTN2ZWI\", \"PO2ART\", \"OAT2PDQ\", \"BEART\", \"HSX4KWGA\", \"HGBART\", \"CR6ZMBNLM\", \"FIO2A\", \"E2SYIZYY\", \"OXYHEM\", \"CARBOXHGBART\", \"METHGBART\", \"W5JBMMIMU\", \"PHCORART\", \"TEMP\" in the last 72 hours.    Invalid input(s): \"UIT4IBFID\"    24 Hour Results:  Recent Results (from the past 24 hour(s))   Urinalysis with Microscopic    Collection Time: 02/03/24  1:24 PM   Result Value Ref Range    Color, UA Yellow/Straw      Appearance Clear Clear      Specific Gravity, UA 1.006 1.003 - 1.030      pH, Urine 7.0 5.0 - 8.0      Protein,  (A) Negative mg/dL    Glucose, UA Negative Negative mg/dL    Ketones, Urine Negative Negative mg/dL    Bilirubin Urine Negative Negative      Blood, Urine Small (A) Negative      Urobilinogen, Urine 0.1 0.1 - 1.0 EU/dL    Nitrite, Urine Negative Negative      Leukocyte Esterase, Urine Negative Negative      WBC, UA 0-4 0 - 4 /hpf    RBC, UA 0-5 0 - 5 /hpf    Epithelial Cells UA Few Few /lpf    BACTERIA, URINE Negative Negative /hpf    Mucus, UA Negative Negative /lpf   Electrolytes urine random    Collection Time: 02/03/24  1:24 PM   Result Value Ref Range    SODIUM, RANDOM URINE 14 mmol/L    POTASSIUM, RANDOM URINE 14 mmol/L    Chloride <10 mmol/L   Osmolality, Urine    Collection Time: 02/03/24  1:24 PM   Result Value Ref Range    Osmolality, Ur 200 MOSM/kg H2O   D-Dimer, Quantitative    Collection Time: 02/03/24  4:01 PM   Result Value Ref Range    D-Dimer, Quant 1.58 (H) <0.50 ug/ml(FEU)   Basic Metabolic Panel    Collection Time: 02/03/24  4:01 PM   Result Value Ref Range    Sodium 122 (L) 136 - 145 mmol/L    Potassium 3.2 (L) 3.5 - 5.1 mmol/L    Chloride 88 (L) 97 - 108

## 2024-02-04 NOTE — CONSULTS
CARDIOLOGY CONSULTATION    REASON FOR CONSULT: New onset atrial fibrillation    REQUESTING PROVIDER: Dr. Tubbs    CHIEF COMPLAINT:  Weakness    HISTORY OF PRESENT ILLNESS:  Lashon Vanegas is a 79 y.o. year-old female with past medical history significant for hypertension, hyperlipidemia who was evaluated today due to new onset atrial fibrillation.  She presented to the ED due to weakness.  She has been having a lot of coughing as well.  She was found to be covid positive.  She reports mild shortness of breath.  She has some palpitations.  No chest pain.  She is normally followed by Dr. Zhu as an outpatient.      Records from hospital admission course thus far reviewed.      Telemetry reviewed. SR with bursts of SVT.    INPATIENT MEDICATIONS:  Home medications reviewed.    Current Facility-Administered Medications:     dextromethorphan (DELSYM) 30 MG/5ML extended release liquid 30 mg, 30 mg, Oral, 2 times per day, Paulo Tubbs MD    guaiFENesin (MUCINEX) extended release tablet 600 mg, 600 mg, Oral, BID, Paulo Tubbs MD, 600 mg at 02/04/24 1316    sodium chloride flush 0.9 % injection 5-40 mL, 5-40 mL, IntraVENous, PRN, Rafael Ferrera MD    0.9 % sodium chloride infusion, , IntraVENous, PRN, Rafael Ferrera MD    magnesium sulfate 2000 mg in 50 mL IVPB premix, 2,000 mg, IntraVENous, PRN, Rafael Ferrera MD    enoxaparin (LOVENOX) injection 40 mg, 40 mg, SubCUTAneous, Daily, Rafael Ferrera MD, 40 mg at 02/04/24 0900    ondansetron (ZOFRAN-ODT) disintegrating tablet 4 mg, 4 mg, Oral, Q8H PRN **OR** ondansetron (ZOFRAN) injection 4 mg, 4 mg, IntraVENous, Q6H PRN, Rafael Ferrera MD    polyethylene glycol (GLYCOLAX) packet 17 g, 17 g, Oral, Daily PRN, Rafael Ferrera MD    acetaminophen (TYLENOL) tablet 650 mg, 650 mg, Oral, Q6H PRN **OR** acetaminophen (TYLENOL) suppository 650 mg, 650 mg, Rectal, Q6H PRN, Rafael Ferrera MD    0.9 % sodium chloride infusion, ,  function  CHADSVASC at least 4-5, will start eliquis  Continue to treat cough, covid  Start low dose diltiazem but may need to change to BB pending results of echo  She will need a monitor, sleep study, and EP evaluation as OP  Hypertension, difficult to control per patient, BP acceptable at present  Hyperlipidemia, statin as able  Hyponatremia, defer management to primary  Covid, supportive care per primary    Thank you for involving us in the care of this patient.  Please do not hesitate to call me or Dr. Zuniga if additional questions arise.    ALLISON WATT, APRN - NP  2/4/2024

## 2024-02-05 ENCOUNTER — APPOINTMENT (OUTPATIENT)
Facility: HOSPITAL | Age: 79
End: 2024-02-05
Payer: MEDICARE

## 2024-02-05 LAB
ALBUMIN SERPL-MCNC: 2.8 G/DL (ref 3.5–5)
ANION GAP SERPL CALC-SCNC: 7 MMOL/L (ref 5–15)
ARTERIAL PATENCY WRIST A: YES
BASE DEFICIT BLDA-SCNC: 1.9 MMOL/L
BDY SITE: ABNORMAL
BODY TEMPERATURE: 97.5
BUN SERPL-MCNC: 14 MG/DL (ref 6–20)
BUN/CREAT SERPL: 23 (ref 12–20)
CA-I BLD-MCNC: 8.1 MG/DL (ref 8.5–10.1)
CHLORIDE SERPL-SCNC: 100 MMOL/L (ref 97–108)
CHLORIDE UR-SCNC: 48 MMOL/L
CO2 SERPL-SCNC: 21 MMOL/L (ref 21–32)
COHGB MFR BLD: 0.2 % (ref 1–2)
CREAT SERPL-MCNC: 0.62 MG/DL (ref 0.55–1.02)
CREAT UR-MCNC: 76 MG/DL
CREAT UR-MCNC: 77 MG/DL
FIO2 ON VENT: 28 %
GAS FLOW.O2 O2 DELIVERY SYS: 2 L/MIN
GLUCOSE SERPL-MCNC: 153 MG/DL (ref 65–100)
HCO3 BLDA-SCNC: 21 MMOL/L (ref 22–26)
METHGB MFR BLD: 0.1 % (ref 0–1.4)
OSMOLALITY UR: 490 MOSM/KG H2O
OXYHGB MFR BLD: 91.2 % (ref 95–99)
PCO2 BLDA: 30 MMHG (ref 35–45)
PERFORMED BY:: ABNORMAL
PH BLDA: 7.47 (ref 7.35–7.45)
PHOSPHATE SERPL-MCNC: 1.8 MG/DL (ref 2.6–4.7)
PO2 BLDA: 59 MMHG (ref 80–100)
POTASSIUM SERPL-SCNC: 3.7 MMOL/L (ref 3.5–5.1)
PROT UR-MCNC: 67 MG/DL (ref 0–11.9)
PROT UR-MCNC: 70 MG/DL (ref 0–11.9)
PROT/CREAT UR-RTO: 0.9
PROT/CREAT UR-RTO: 0.9
SAO2 % BLD: 92 % (ref 95–99)
SAO2% DEVICE SAO2% SENSOR NAME: ABNORMAL
SODIUM SERPL-SCNC: 128 MMOL/L (ref 136–145)
SODIUM UR-SCNC: 43 MMOL/L
SPECIMEN SITE: ABNORMAL

## 2024-02-05 PROCEDURE — 36600 WITHDRAWAL OF ARTERIAL BLOOD: CPT

## 2024-02-05 PROCEDURE — 82803 BLOOD GASES ANY COMBINATION: CPT

## 2024-02-05 PROCEDURE — 71045 X-RAY EXAM CHEST 1 VIEW: CPT

## 2024-02-05 PROCEDURE — 84156 ASSAY OF PROTEIN URINE: CPT

## 2024-02-05 PROCEDURE — 84300 ASSAY OF URINE SODIUM: CPT

## 2024-02-05 PROCEDURE — 82436 ASSAY OF URINE CHLORIDE: CPT

## 2024-02-05 PROCEDURE — 6370000000 HC RX 637 (ALT 250 FOR IP): Performed by: NURSE PRACTITIONER

## 2024-02-05 PROCEDURE — 6370000000 HC RX 637 (ALT 250 FOR IP): Performed by: INTERNAL MEDICINE

## 2024-02-05 PROCEDURE — 2580000003 HC RX 258: Performed by: INTERNAL MEDICINE

## 2024-02-05 PROCEDURE — 2500000003 HC RX 250 WO HCPCS: Performed by: INTERNAL MEDICINE

## 2024-02-05 PROCEDURE — 83935 ASSAY OF URINE OSMOLALITY: CPT

## 2024-02-05 PROCEDURE — 82570 ASSAY OF URINE CREATININE: CPT

## 2024-02-05 PROCEDURE — 6360000002 HC RX W HCPCS: Performed by: INTERNAL MEDICINE

## 2024-02-05 PROCEDURE — 80069 RENAL FUNCTION PANEL: CPT

## 2024-02-05 PROCEDURE — 94761 N-INVAS EAR/PLS OXIMETRY MLT: CPT

## 2024-02-05 PROCEDURE — 2060000000 HC ICU INTERMEDIATE R&B

## 2024-02-05 RX ORDER — SODIUM CHLORIDE 9 MG/ML
INJECTION, SOLUTION INTRAVENOUS CONTINUOUS
Status: DISPENSED | OUTPATIENT
Start: 2024-02-05 | End: 2024-02-07

## 2024-02-05 RX ADMIN — DILTIAZEM HYDROCHLORIDE 30 MG: 30 TABLET, FILM COATED ORAL at 06:22

## 2024-02-05 RX ADMIN — Medication 30 MG: at 21:05

## 2024-02-05 RX ADMIN — POTASSIUM PHOSPHATES 15 MMOL: 236; 224 INJECTION, SOLUTION INTRAVENOUS at 11:33

## 2024-02-05 RX ADMIN — APIXABAN 5 MG: 5 TABLET, FILM COATED ORAL at 09:26

## 2024-02-05 RX ADMIN — GUAIFENESIN 600 MG: 600 TABLET, EXTENDED RELEASE ORAL at 21:05

## 2024-02-05 RX ADMIN — DEXAMETHASONE SODIUM PHOSPHATE 6 MG: 10 INJECTION INTRAMUSCULAR; INTRAVENOUS at 11:33

## 2024-02-05 RX ADMIN — APIXABAN 5 MG: 5 TABLET, FILM COATED ORAL at 21:05

## 2024-02-05 RX ADMIN — DILTIAZEM HYDROCHLORIDE 30 MG: 30 TABLET, FILM COATED ORAL at 23:45

## 2024-02-05 RX ADMIN — SODIUM PHOSPHATE, MONOBASIC, MONOHYDRATE AND SODIUM PHOSPHATE, DIBASIC, ANHYDROUS 30 MMOL: 142; 276 INJECTION, SOLUTION INTRAVENOUS at 16:48

## 2024-02-05 RX ADMIN — DILTIAZEM HYDROCHLORIDE 30 MG: 30 TABLET, FILM COATED ORAL at 11:33

## 2024-02-05 RX ADMIN — SODIUM CHLORIDE, PRESERVATIVE FREE 10 ML: 5 INJECTION INTRAVENOUS at 21:05

## 2024-02-05 RX ADMIN — Medication 30 MG: at 09:26

## 2024-02-05 RX ADMIN — SODIUM CHLORIDE: 9 INJECTION, SOLUTION INTRAVENOUS at 13:15

## 2024-02-05 RX ADMIN — GUAIFENESIN 600 MG: 600 TABLET, EXTENDED RELEASE ORAL at 09:26

## 2024-02-05 RX ADMIN — DILTIAZEM HYDROCHLORIDE 30 MG: 30 TABLET, FILM COATED ORAL at 17:49

## 2024-02-05 NOTE — PROGRESS NOTES
SpO2 91% on room air at rest. Patient did not ambulate at this time. Patient states that she has issues with balance.

## 2024-02-05 NOTE — PROGRESS NOTES
Hospitalist Progress Note               Daily Progress Note: 2/5/2024      Chief complaint:   Chief Complaint   Patient presents with    Shortness of Breath    Fatigue    Positive For Covid-19        Subjective:   Hospital course to date:  Lashon Vanegas is a 79 y.o. female presents to the emergency department for evaluation of generalized weakness fatigue, difficulty getting around her home for the last 2 to 3 days.  Patient was diagnosed with COVID-19 approximately 5 days ago was started on Paxlovid.  Patient states she has had significant abdominal discomfort nausea and diarrhea since then.  Has not eaten anything significant.  No noted any recurrent fevers, no trouble breathing, positive dry cough nonproductive.  Denies any chest pains or loss of consciousness. She was transferred from the ER to the 4th floor on 2/4.     --------  Patient is seen today for follow-up.    Patient was seen while she sat up in bed 90% on room air. She reported overall improved respiratory symptoms along with semisolid stools. She reports poor sleep last night due to a nightmare. Lower extremity duplex shows no sign of DVT in the legs. EKG as of 2/4 shows atrial fibrillation along with blood work showing hyponatremia at 128 as of 2/5.       Medications reviewed  Current Facility-Administered Medications   Medication Dose Route Frequency    dextromethorphan (DELSYM) 30 MG/5ML extended release liquid 30 mg  30 mg Oral 2 times per day    guaiFENesin (MUCINEX) extended release tablet 600 mg  600 mg Oral BID    apixaban (ELIQUIS) tablet 5 mg  5 mg Oral BID    dilTIAZem (CARDIZEM) tablet 30 mg  30 mg Oral 4 times per day    sodium chloride flush 0.9 % injection 5-40 mL  5-40 mL IntraVENous PRN    0.9 % sodium chloride infusion   IntraVENous PRN    magnesium sulfate 2000 mg in 50 mL IVPB premix  2,000 mg IntraVENous PRN    ondansetron (ZOFRAN-ODT) disintegrating tablet 4 mg  4 mg Oral Q8H PRN    Or    ondansetron (ZOFRAN) injection 4 mg   Glucose 153 (H) 65 - 100 mg/dL    BUN 14 6 - 20 mg/dL    Creatinine 0.62 0.55 - 1.02 mg/dL    Bun/Cre Ratio 23 (H) 12 - 20      Est, Glom Filt Rate >60 >60 ml/min/1.73m2    Calcium 8.1 (L) 8.5 - 10.1 mg/dL    Phosphorus 1.8 (L) 2.6 - 4.7 mg/dL    Albumin 2.8 (L) 3.5 - 5.0 g/dL   Protein / creatinine ratio, urine    Collection Time: 02/05/24  5:30 AM   Result Value Ref Range    Protein, Urine, Random 70 (H) 0.0 - 11.9 mg/dL    Creatinine, Ur 77.00 mg/dL    PROTEIN/CREAT RATIO URINE RAN 0.9     Sodium, urine, random    Collection Time: 02/05/24  5:30 AM   Result Value Ref Range    SODIUM, RANDOM URINE 43 mmol/L   Chloride, Random Urine    Collection Time: 02/05/24  5:30 AM   Result Value Ref Range    Chloride 48 mmol/L       Vascular duplex lower extremity venous bilateral   Final Result      XR CHEST PORTABLE   Final Result      No acute process on portable chest.                Discussion/MDM:     [] High (any 2)    A. Problems (any 1)  [] Acute/Chronic Illness/injury posing threat to life or bodily function:    [x] Severe exacerbation of chronic illness:    ---------------------------------------------------------------------  B. Risk of Treatment (any 1)   [x] Drugs/treatments that require intensive monitoring for toxicity include:    [] IV ABX requiring serial renal monitoring for nephrotoxicity:     [] IV Narcotic analgesia for adverse drug reaction  [] Aggressive IV diuresis requiring serial monitoring for renal impairment and electrolyte derangements  [x] Critical electrolyte abnormalities requiring IV replacement and close serial monitoring  [] SQ Insulin SS- monitoring serial FSBS for Hypoglycemic adverse drug reaction  [] Other -   [] Change in code status:    [] Decision to escalate care:    [] Major surgery/procedure with associated risk factors:    ----------------------------------------------------------------------  CLamin Data (any 2)  [] Discussed current management and discharge planning options with

## 2024-02-05 NOTE — PROGRESS NOTES
Renal Progress Note    Patient: Lashon Vanegas MRN: [id number]  SSN: [id number]    YOB: 1945  Age: 79 y.o.  Sex: female      Admit Date: 2/3/2024    LOS: 2 days     Subjective:   Patient seen at bedside. Alert and awake, no acute distress.   Patient is feeling better, not giving any new complaints today.   No complaints of any swelling in lower extremities.   Repeat labs showed a sodium of 124 this morning      Current Facility-Administered Medications   Medication Dose Route Frequency    potassium phosphate 15 mmol in sodium chloride 0.9 % 250 mL IVPB  15 mmol IntraVENous Once    sodium phosphate 30 mmol in sodium chloride 0.9 % 500 mL IVPB  30 mmol IntraVENous Once    dextromethorphan (DELSYM) 30 MG/5ML extended release liquid 30 mg  30 mg Oral 2 times per day    guaiFENesin (MUCINEX) extended release tablet 600 mg  600 mg Oral BID    apixaban (ELIQUIS) tablet 5 mg  5 mg Oral BID    dilTIAZem (CARDIZEM) tablet 30 mg  30 mg Oral 4 times per day    sodium chloride flush 0.9 % injection 5-40 mL  5-40 mL IntraVENous PRN    0.9 % sodium chloride infusion   IntraVENous PRN    magnesium sulfate 2000 mg in 50 mL IVPB premix  2,000 mg IntraVENous PRN    ondansetron (ZOFRAN-ODT) disintegrating tablet 4 mg  4 mg Oral Q8H PRN    Or    ondansetron (ZOFRAN) injection 4 mg  4 mg IntraVENous Q6H PRN    polyethylene glycol (GLYCOLAX) packet 17 g  17 g Oral Daily PRN    sodium chloride flush 0.9 % injection 5-40 mL  5-40 mL IntraVENous 2 times per day    sodium chloride flush 0.9 % injection 5-40 mL  5-40 mL IntraVENous PRN    0.9 % sodium chloride infusion   IntraVENous PRN    acetaminophen (TYLENOL) tablet 650 mg  650 mg Oral Q6H PRN    Or    acetaminophen (TYLENOL) suppository 650 mg  650 mg Rectal Q6H PRN    dexAMETHasone (PF) (DECADRON) injection 6 mg  6 mg IntraVENous Q24H    senna (SENOKOT) tablet 8.6 mg  1 tablet Oral BID PRN        Vitals:    02/04/24 2052 02/05/24 0303 02/05/24 0820 02/05/24 1236   BP: 121/82  suggestive of volume depletion   improving sodium levels with IV normal saline, appropriate rate of correction from 116 yesterday to 128 today  Will continue on normal saline  at 100 mL/hour  Recommend strict p.o. fluid restriction to 1200 mL per day to restrict hypotonic fluid intake  I will discontinue every 6 hours sodium testing  Will continue to monitor sodium levels closely and adjust management as needed      2.  Hypokalemia: Improved potassium level with supplementation   continue to monitor potassium levels and supplement as needed  Mag level is normal at 2.0     3.  Shortness of breath/recently diagnosed COVID-19 infection  She is feeling much better with improvement in her respiratory symptoms   initial chest x-ray was reported in negative for any infiltrates  Pulmonary following     4.  Hypertension: Stable blood pressures , not on any antihypertensive medications at this time  continue to monitor blood pressures        Signed By: Lyric Stanley MD     February 5, 2024

## 2024-02-05 NOTE — PROGRESS NOTES
Renal Progress Note    Patient: Lashon Vanegas MRN: [id number]  SSN: [id number]    YOB: 1945  Age: 79 y.o.  Sex: female      Admit Date: 2/3/2024    LOS: 1 day     Subjective:   Patient seen at bedside. Alert and awake, no acute distress.   Patient is feeling better, not giving any new complaints today.   No complaints of any swelling in lower extremities.   Repeat labs showed a sodium of 124 this morning      Current Facility-Administered Medications   Medication Dose Route Frequency    dextromethorphan (DELSYM) 30 MG/5ML extended release liquid 30 mg  30 mg Oral 2 times per day    guaiFENesin (MUCINEX) extended release tablet 600 mg  600 mg Oral BID    apixaban (ELIQUIS) tablet 5 mg  5 mg Oral BID    dilTIAZem (CARDIZEM) tablet 30 mg  30 mg Oral 4 times per day    sodium chloride flush 0.9 % injection 5-40 mL  5-40 mL IntraVENous PRN    0.9 % sodium chloride infusion   IntraVENous PRN    magnesium sulfate 2000 mg in 50 mL IVPB premix  2,000 mg IntraVENous PRN    ondansetron (ZOFRAN-ODT) disintegrating tablet 4 mg  4 mg Oral Q8H PRN    Or    ondansetron (ZOFRAN) injection 4 mg  4 mg IntraVENous Q6H PRN    polyethylene glycol (GLYCOLAX) packet 17 g  17 g Oral Daily PRN    0.9 % sodium chloride infusion   IntraVENous Continuous    sodium chloride flush 0.9 % injection 5-40 mL  5-40 mL IntraVENous 2 times per day    sodium chloride flush 0.9 % injection 5-40 mL  5-40 mL IntraVENous PRN    0.9 % sodium chloride infusion   IntraVENous PRN    acetaminophen (TYLENOL) tablet 650 mg  650 mg Oral Q6H PRN    Or    acetaminophen (TYLENOL) suppository 650 mg  650 mg Rectal Q6H PRN    dexAMETHasone (PF) (DECADRON) injection 6 mg  6 mg IntraVENous Q24H    senna (SENOKOT) tablet 8.6 mg  1 tablet Oral BID PRN        Vitals:    02/04/24 0818 02/04/24 1041 02/04/24 1513 02/04/24 2004   BP: (!) 151/79 (!) 143/80 (!) 146/85 122/85   Pulse: 82 (!) 103 89 87   Resp: 20 20 20 18   Temp: 98.4 °F (36.9 °C) 98.2 °F (36.8 °C)  98.1 °F (36.7 °C) 98.1 °F (36.7 °C)   TempSrc: Oral Oral Oral    SpO2: 93% 93% 91% 90%   Weight:       Height:         Objective:   General: alert awake well-oriented, no acute distress.  HEENT: EOMI, no Icterus, no Pallor,  mucosa moist.  Neck: Neck is supple, No JVD  Lungs: breathsounds normal, no respiratory distress on inspection,    CVS: heart sounds normal, regular rate and rhythm, no murmurs, no rubs.   GI: soft, nontender, normal BS.  Extremeties: no cyanosis, no edema,   Neuro: Alert, awake, oriented x3,  moving all extremeties well.   Skin: normal skin turgor, no skin rashes.        Intake and Output:  Current Shift: 02/04 1901 - 02/05 0700  In: -   Out: 650 [Urine:650]  Last three shifts: 02/03 0701 - 02/04 1900  In: 800 [P.O.:800]  Out: 550 [Urine:550]      Lab/Data Review:  Recent Labs     02/03/24  1041   WBC 8.6   HGB 12.6   HCT 35.5        Recent Labs     02/03/24  1041 02/03/24  1601 02/04/24  0526 02/04/24  1330   * 122* 124*  --    K 3.3* 3.2* 3.7  --    CL 78* 88* 94*  --    CO2 30 26 23  --    GLUCOSE 142* 127* 138*  --    BUN 14 12 12  --    CREATININE 0.89 0.74 0.58  --    CALCIUM 8.4* 8.3* 8.3*  --    MG  --   --   --  2.0   PHOS  --   --  2.2*  --    LABALBU 3.2*  --  3.0*  --    BILITOT 0.8  --   --   --    AST 25  --   --   --    ALT 29  --   --   --      No results for input(s): \"PHART\", \"ZWD5PUB\", \"PO2ART\", \"UGJ5PEP\", \"BEART\", \"BXH1UPXN\", \"HGBART\", \"SC2FSTRYX\", \"FIO2A\", \"F4YLCDEM\", \"OXYHEM\", \"CARBOXHGBART\", \"METHGBART\", \"B1DHDQCOL\", \"PHCORART\", \"TEMP\" in the last 72 hours.    Invalid input(s): \"EWD2ODRGX\"  Recent Results (from the past 24 hour(s))   Renal Function Panel    Collection Time: 02/04/24  5:26 AM   Result Value Ref Range    Sodium 124 (L) 136 - 145 mmol/L    Potassium 3.7 3.5 - 5.1 mmol/L    Chloride 94 (L) 97 - 108 mmol/L    CO2 23 21 - 32 mmol/L    Anion Gap 7 5 - 15 mmol/L    Glucose 138 (H) 65 - 100 mg/dL    BUN 12 6 - 20 mg/dL    Creatinine 0.58 0.55 - 1.02  mg/dL    Bun/Cre Ratio 21 (H) 12 - 20      Est, Glom Filt Rate >60 >60 ml/min/1.73m2    Calcium 8.3 (L) 8.5 - 10.1 mg/dL    Phosphorus 2.2 (L) 2.6 - 4.7 mg/dL    Albumin 3.0 (L) 3.5 - 5.0 g/dL   C-Reactive Protein    Collection Time: 02/04/24  5:26 AM   Result Value Ref Range    CRP 4.45 (H) 0.00 - 0.30 mg/dL   Vitamin D 25 Hydroxy    Collection Time: 02/04/24  5:26 AM   Result Value Ref Range    Vit D, 25-Hydroxy 48.2 30 - 100 ng/mL   Vascular duplex lower extremity venous bilateral    Collection Time: 02/04/24 10:36 AM   Result Value Ref Range    Body Surface Area 1.69 m2   Magnesium    Collection Time: 02/04/24  1:30 PM   Result Value Ref Range    Magnesium 2.0 1.6 - 2.4 mg/dL   EKG 12 Lead    Collection Time: 02/04/24  3:07 PM   Result Value Ref Range    Ventricular Rate 90 BPM    Atrial Rate 105 BPM    QRS Duration 90 ms    Q-T Interval 356 ms    QTc Calculation (Bazett) 435 ms    R Axis 50 degrees    T Axis 36 degrees    Diagnosis       Atrial fibrillation  Abnormal ECG  When compared with ECG of 03-FEB-2024 10:42,  Nonspecific T wave abnormality no longer evident in Lateral leads  QT has shortened  Confirmed by WALTER RM ([id number]) on 2/4/2024 3:57:27 PM          Assessment and Plan:     Hyponatremia:  Probably related to increased p.o. fluid consumption/salt restriction and use of spironolactone   No history of thiazide use  Held spironolactone  Low urine sodium is suggestive of sodium deficiency  Improving sodium levels with IV normal saline, appropriate rate of correction from 116 yesterday to 124 today    Will continue on normal saline  at 100 mL/hour  Recommend strict p.o. fluid restriction to 1200 mL per day   continue sodium monitoring every 6 hours  Will continue to monitor sodium levels closely and adjust management as needed      2.  Hypokalemia: Improved potassium level with supplementation   continue to monitor potassium levels and supplement as needed  Mag level is normal at 2.0     3.  Shortness

## 2024-02-05 NOTE — PROGRESS NOTES
Hospitalist Progress Note               Daily Progress Note: 2/5/2024      Chief complaint:   Chief Complaint   Patient presents with    Shortness of Breath    Fatigue    Positive For Covid-19        Subjective:   Hospital course to date:  Lashon Vanegas is a 79 y.o. female presents to the emergency department for evaluation of generalized weakness fatigue, difficulty getting around her home for the last 2 to 3 days.  Patient was diagnosed with COVID-19 approximately 5 days ago was started on Paxlovid.  Patient states she has had significant abdominal discomfort nausea and diarrhea since then.  Has not eaten anything significant.  No noted any recurrent fevers, no trouble breathing, positive dry cough nonproductive.  Denies any chest pains or loss of consciousness. She was transferred from the ER to the 4th floor on 2/4.     --------  Patient is seen today for follow-up.  Patient was seen while she sat up in bed 90% on room air. She reported overall improved respiratory symptoms along with semisolid stools. She reports poor sleep last night due to a nightmare. Lower extremity duplex shows no sign of DVT in the legs. EKG as of 2/4 shows atrial fibrillation along with blood work showing hyponatremia at 128 as of 2/5. Patient is already being seen by cardiology, pulmonology, and nephrology.       Medications reviewed  Current Facility-Administered Medications   Medication Dose Route Frequency    dextromethorphan (DELSYM) 30 MG/5ML extended release liquid 30 mg  30 mg Oral 2 times per day    guaiFENesin (MUCINEX) extended release tablet 600 mg  600 mg Oral BID    apixaban (ELIQUIS) tablet 5 mg  5 mg Oral BID    dilTIAZem (CARDIZEM) tablet 30 mg  30 mg Oral 4 times per day    sodium chloride flush 0.9 % injection 5-40 mL  5-40 mL IntraVENous PRN    0.9 % sodium chloride infusion   IntraVENous PRN    magnesium sulfate 2000 mg in 50 mL IVPB premix  2,000 mg IntraVENous PRN    ondansetron (ZOFRAN-ODT) disintegrating

## 2024-02-05 NOTE — PROGRESS NOTES
Pulmonary and Critical Care progress note    Subjective:     Chief Complaint:   Chief Complaint   Patient presents with    Shortness of Breath    Fatigue    Positive For Covid-19      Patient seen and examined in her room on the floor this afternoon, no acute events overnight.  Saturating well on room air, unable to complete walking O2 test today.  Net -1 L yesterday, sodium level improving to 128 today from 124 yesterday.  During the hospitalization, lower extremity venous Dopplers are negative for clots, chest x-ray nonacute on admission.  Currently on normal saline per nephrology recommendations for hyponatremia.  Phosphorus level low at 1.8 today, getting 30 mmol IV sodium phosphorus.  Repeat electrolytes in the morning.  Repeat inflammatory markers in the morning, currently on IV Decadron but does not qualify for more aggressive anti-COVID-19 therapies        Review of Systems:  Pertinent items are noted in HPI.                Current Facility-Administered Medications   Medication Dose Route Frequency Provider Last Rate Last Admin    potassium phosphate 15 mmol in sodium chloride 0.9 % 250 mL IVPB  15 mmol IntraVENous Once Lyric Stanley MD 62.5 mL/hr at 02/05/24 1133 15 mmol at 02/05/24 1133    sodium phosphate 30 mmol in sodium chloride 0.9 % 500 mL IVPB  30 mmol IntraVENous Once Иван Snell, DO        0.9 % sodium chloride infusion   IntraVENous Continuous Lyric Stanley MD        dextromethorphan (DELSYM) 30 MG/5ML extended release liquid 30 mg  30 mg Oral 2 times per day Paulo Tubbs MD   30 mg at 02/05/24 0926    guaiFENesin (MUCINEX) extended release tablet 600 mg  600 mg Oral BID Paulo Tubbs MD   600 mg at 02/05/24 0926    apixaban (ELIQUIS) tablet 5 mg  5 mg Oral BID Tisha Cisneros APRN - NP   5 mg at 02/05/24 0926    dilTIAZem (CARDIZEM) tablet 30 mg  30 mg Oral 4 times per day Tisha Cisneros APRN - NP   30 mg at 02/05/24 1133    sodium chloride flush 0.9 % injection 5-40 mL   room air  Eye: Reactive, symmetric  Throat and Neck: Supple  Lung: Reduced air entry bilaterally with prolonged exhalation but no wheezing.  Occasional crackles.  Heart: S1+S2.  No murmurs  Abdomen: soft, non-tender. Bowel sounds normal. No masses; obese  Extremities: No edema  : Not done  Skin: No cyanosis  Neurologic: A & O x3.  Grossly nonfocal  Psychiatric: Appropriate affect; coherent      Lab/Data Review:          Recent Results (from the past 24 hour(s))   EKG 12 Lead    Collection Time: 02/04/24  3:07 PM   Result Value Ref Range    Ventricular Rate 90 BPM    Atrial Rate 105 BPM    QRS Duration 90 ms    Q-T Interval 356 ms    QTc Calculation (Bazett) 435 ms    R Axis 50 degrees    T Axis 36 degrees    Diagnosis       Atrial fibrillation  Abnormal ECG  When compared with ECG of 03-FEB-2024 10:42,  Nonspecific T wave abnormality no longer evident in Lateral leads  QT has shortened  Confirmed by WALTER RM ([id number]) on 2/4/2024 3:57:27 PM     Protein / creatinine ratio, urine    Collection Time: 02/05/24  4:25 AM   Result Value Ref Range    Protein, Urine, Random 67 (H) 0.0 - 11.9 mg/dL    Creatinine, Ur 76.00 mg/dL    PROTEIN/CREAT RATIO URINE RAN 0.9     Renal Function Panel    Collection Time: 02/05/24  5:27 AM   Result Value Ref Range    Sodium 128 (L) 136 - 145 mmol/L    Potassium 3.7 3.5 - 5.1 mmol/L    Chloride 100 97 - 108 mmol/L    CO2 21 21 - 32 mmol/L    Anion Gap 7 5 - 15 mmol/L    Glucose 153 (H) 65 - 100 mg/dL    BUN 14 6 - 20 mg/dL    Creatinine 0.62 0.55 - 1.02 mg/dL    Bun/Cre Ratio 23 (H) 12 - 20      Est, Glom Filt Rate >60 >60 ml/min/1.73m2    Calcium 8.1 (L) 8.5 - 10.1 mg/dL    Phosphorus 1.8 (L) 2.6 - 4.7 mg/dL    Albumin 2.8 (L) 3.5 - 5.0 g/dL   Protein / creatinine ratio, urine    Collection Time: 02/05/24  5:30 AM   Result Value Ref Range    Protein, Urine, Random 70 (H) 0.0 - 11.9 mg/dL    Creatinine, Ur 77.00 mg/dL    PROTEIN/CREAT RATIO URINE RAN 0.9     Sodium, urine, random     PM

## 2024-02-06 ENCOUNTER — APPOINTMENT (OUTPATIENT)
Facility: HOSPITAL | Age: 79
End: 2024-02-06
Payer: MEDICARE

## 2024-02-06 LAB
ALBUMIN SERPL-MCNC: 2.7 G/DL (ref 3.5–5)
ANION GAP SERPL CALC-SCNC: 7 MMOL/L (ref 5–15)
BUN SERPL-MCNC: 15 MG/DL (ref 6–20)
BUN/CREAT SERPL: 26 (ref 12–20)
CA-I BLD-MCNC: 8 MG/DL (ref 8.5–10.1)
CHLORIDE SERPL-SCNC: 105 MMOL/L (ref 97–108)
CO2 SERPL-SCNC: 22 MMOL/L (ref 21–32)
CREAT SERPL-MCNC: 0.57 MG/DL (ref 0.55–1.02)
CRP SERPL-MCNC: 1.11 MG/DL (ref 0–0.3)
D DIMER PPP FEU-MCNC: 0.66 UG/ML(FEU)
FERRITIN SERPL-MCNC: 88 NG/ML (ref 26–388)
GLUCOSE SERPL-MCNC: 151 MG/DL (ref 65–100)
LDH SERPL L TO P-CCNC: 242 U/L (ref 81–246)
M PNEUMO IGM SER IA-ACNC: REACTIVE
MAGNESIUM SERPL-MCNC: 2 MG/DL (ref 1.6–2.4)
PHOSPHATE SERPL-MCNC: 3 MG/DL (ref 2.6–4.7)
POTASSIUM SERPL-SCNC: 3.4 MMOL/L (ref 3.5–5.1)
SODIUM SERPL-SCNC: 134 MMOL/L (ref 136–145)

## 2024-02-06 PROCEDURE — 82728 ASSAY OF FERRITIN: CPT

## 2024-02-06 PROCEDURE — 36415 COLL VENOUS BLD VENIPUNCTURE: CPT

## 2024-02-06 PROCEDURE — 87040 BLOOD CULTURE FOR BACTERIA: CPT

## 2024-02-06 PROCEDURE — 6370000000 HC RX 637 (ALT 250 FOR IP): Performed by: INTERNAL MEDICINE

## 2024-02-06 PROCEDURE — 92610 EVALUATE SWALLOWING FUNCTION: CPT

## 2024-02-06 PROCEDURE — 2060000000 HC ICU INTERMEDIATE R&B

## 2024-02-06 PROCEDURE — 6360000002 HC RX W HCPCS: Performed by: HOSPITALIST

## 2024-02-06 PROCEDURE — 86738 MYCOPLASMA ANTIBODY: CPT

## 2024-02-06 PROCEDURE — 80069 RENAL FUNCTION PANEL: CPT

## 2024-02-06 PROCEDURE — 6370000000 HC RX 637 (ALT 250 FOR IP): Performed by: HOSPITALIST

## 2024-02-06 PROCEDURE — 2580000003 HC RX 258: Performed by: HOSPITALIST

## 2024-02-06 PROCEDURE — 93306 TTE W/DOPPLER COMPLETE: CPT

## 2024-02-06 PROCEDURE — 87205 SMEAR GRAM STAIN: CPT

## 2024-02-06 PROCEDURE — 2580000003 HC RX 258: Performed by: INTERNAL MEDICINE

## 2024-02-06 PROCEDURE — 94760 N-INVAS EAR/PLS OXIMETRY 1: CPT

## 2024-02-06 PROCEDURE — 87070 CULTURE OTHR SPECIMN AEROBIC: CPT

## 2024-02-06 PROCEDURE — 6370000000 HC RX 637 (ALT 250 FOR IP)

## 2024-02-06 PROCEDURE — 86140 C-REACTIVE PROTEIN: CPT

## 2024-02-06 PROCEDURE — 6370000000 HC RX 637 (ALT 250 FOR IP): Performed by: NURSE PRACTITIONER

## 2024-02-06 PROCEDURE — 83615 LACTATE (LD) (LDH) ENZYME: CPT

## 2024-02-06 PROCEDURE — 85379 FIBRIN DEGRADATION QUANT: CPT

## 2024-02-06 PROCEDURE — 83735 ASSAY OF MAGNESIUM: CPT

## 2024-02-06 PROCEDURE — 6360000002 HC RX W HCPCS: Performed by: INTERNAL MEDICINE

## 2024-02-06 RX ORDER — POTASSIUM CHLORIDE 20 MEQ/1
40 TABLET, EXTENDED RELEASE ORAL ONCE
Status: COMPLETED | OUTPATIENT
Start: 2024-02-06 | End: 2024-02-06

## 2024-02-06 RX ADMIN — APIXABAN 5 MG: 5 TABLET, FILM COATED ORAL at 20:03

## 2024-02-06 RX ADMIN — DEXAMETHASONE SODIUM PHOSPHATE 6 MG: 10 INJECTION INTRAMUSCULAR; INTRAVENOUS at 13:45

## 2024-02-06 RX ADMIN — GUAIFENESIN 600 MG: 600 TABLET, EXTENDED RELEASE ORAL at 20:03

## 2024-02-06 RX ADMIN — DILTIAZEM HYDROCHLORIDE 60 MG: 30 TABLET, FILM COATED ORAL at 13:45

## 2024-02-06 RX ADMIN — APIXABAN 5 MG: 5 TABLET, FILM COATED ORAL at 09:41

## 2024-02-06 RX ADMIN — DILTIAZEM HYDROCHLORIDE 30 MG: 30 TABLET, FILM COATED ORAL at 05:49

## 2024-02-06 RX ADMIN — AZITHROMYCIN MONOHYDRATE 500 MG: 500 INJECTION, POWDER, LYOPHILIZED, FOR SOLUTION INTRAVENOUS at 02:16

## 2024-02-06 RX ADMIN — BARICITINIB 4 MG: 2 TABLET, FILM COATED ORAL at 16:53

## 2024-02-06 RX ADMIN — Medication 30 MG: at 20:03

## 2024-02-06 RX ADMIN — SODIUM CHLORIDE, PRESERVATIVE FREE 10 ML: 5 INJECTION INTRAVENOUS at 20:03

## 2024-02-06 RX ADMIN — DILTIAZEM HYDROCHLORIDE 60 MG: 30 TABLET, FILM COATED ORAL at 20:03

## 2024-02-06 RX ADMIN — POTASSIUM CHLORIDE 40 MEQ: 1500 TABLET, EXTENDED RELEASE ORAL at 09:42

## 2024-02-06 RX ADMIN — CEFTRIAXONE SODIUM 1000 MG: 1 INJECTION, POWDER, FOR SOLUTION INTRAMUSCULAR; INTRAVENOUS at 02:17

## 2024-02-06 RX ADMIN — GUAIFENESIN 600 MG: 600 TABLET, EXTENDED RELEASE ORAL at 09:42

## 2024-02-06 RX ADMIN — Medication 30 MG: at 09:41

## 2024-02-06 NOTE — PROGRESS NOTES
CARDIOLOGY PROGRESS NOTE      Patient Name: Lashon Vanegas  Age: 79 y.o.  Gender:female  :1945  MRN: [id number]    Patient seen and examined. This is a patient with a history of  hypertension, hyperlipidemia who presented with weakness now being followed for new onset atrial fibrillation. Chest xray yesterday consistent with pneumonia. Continues to endorse shortness of breath, denies chest pain. Remains on 2L nasal cannula. Daughter at bedside. No other complaints reported.    Telemetry reviewed, Afib 70s-110s    Pertinent review of systems items noted above, all other systems are negative. Current medications reviewed.    Physical Examination    Allergies   Allergen Reactions    Codeine Other (See Comments)    Morphine      Other reaction(s): Not Reported This Time    Hydrochlorothiazide Rash     Vitals:    24 1120   BP: (!) 148/81   Pulse: 100   Resp: 23   Temp: 97.5 °F (36.4 °C)   SpO2: 92%     Vital signs are stable  No apparent distress.  Heart has a irregularly irregular rhythm.  + murmur  Lungs are diminished  Abdomen is soft, nontender, normal bowel sounds.  Extremities have no edema  Skin is dry and warm.  Normal affect    Labs reviewed:  Recent Results (from the past 12 hour(s))   Renal Function Panel    Collection Time: 24  4:39 AM   Result Value Ref Range    Sodium 134 (L) 136 - 145 mmol/L    Potassium 3.4 (L) 3.5 - 5.1 mmol/L    Chloride 105 97 - 108 mmol/L    CO2 22 21 - 32 mmol/L    Anion Gap 7 5 - 15 mmol/L    Glucose 151 (H) 65 - 100 mg/dL    BUN 15 6 - 20 mg/dL    Creatinine 0.57 0.55 - 1.02 mg/dL    Bun/Cre Ratio 26 (H) 12 - 20      Est, Glom Filt Rate >60 >60 ml/min/1.73m2    Calcium 8.0 (L) 8.5 - 10.1 mg/dL    Phosphorus 3.0 2.6 - 4.7 mg/dL    Albumin 2.7 (L) 3.5 - 5.0 g/dL   Magnesium    Collection Time: 24  4:39 AM   Result Value Ref Range    Magnesium 2.0 1.6 - 2.4 mg/dL   C-Reactive Protein    Collection Time: 24  4:39 AM   Result Value Ref Range    CRP 1.11

## 2024-02-06 NOTE — PROGRESS NOTES
Renal Progress Note    Patient: Lashon Vanegas MRN: [id number]  SSN: [id number]    YOB: 1945  Age: 79 y.o.  Sex: female      Admit Date: 2/3/2024    LOS: 3 days     Subjective:   Patient seen at bedside. Alert and awake, no acute distress.   Patient is feeling better, not giving any new complaints today.   No complaints of any swelling in lower extremities.   Repeat labs showed a sodium improved to 134 today  isotonic saline decreased to 60 mL/h because of elevated blood pressure      Current Facility-Administered Medications   Medication Dose Route Frequency    cefTRIAXone (ROCEPHIN) 1,000 mg in sterile water 10 mL IV syringe  1,000 mg IntraVENous Q24H    azithromycin (ZITHROMAX) 500 mg in sodium chloride 0.9 % 250 mL IVPB ([id number])  500 mg IntraVENous Q24H    baricitinib (OLUMIANT) tablet 4 mg  4 mg Oral Daily    dilTIAZem (CARDIZEM) tablet 60 mg  60 mg Oral 4 times per day    0.9 % sodium chloride infusion   IntraVENous Continuous    dextromethorphan (DELSYM) 30 MG/5ML extended release liquid 30 mg  30 mg Oral 2 times per day    guaiFENesin (MUCINEX) extended release tablet 600 mg  600 mg Oral BID    apixaban (ELIQUIS) tablet 5 mg  5 mg Oral BID    sodium chloride flush 0.9 % injection 5-40 mL  5-40 mL IntraVENous PRN    0.9 % sodium chloride infusion   IntraVENous PRN    magnesium sulfate 2000 mg in 50 mL IVPB premix  2,000 mg IntraVENous PRN    ondansetron (ZOFRAN-ODT) disintegrating tablet 4 mg  4 mg Oral Q8H PRN    Or    ondansetron (ZOFRAN) injection 4 mg  4 mg IntraVENous Q6H PRN    polyethylene glycol (GLYCOLAX) packet 17 g  17 g Oral Daily PRN    sodium chloride flush 0.9 % injection 5-40 mL  5-40 mL IntraVENous 2 times per day    sodium chloride flush 0.9 % injection 5-40 mL  5-40 mL IntraVENous PRN    0.9 % sodium chloride infusion   IntraVENous PRN    acetaminophen (TYLENOL) tablet 650 mg  650 mg Oral Q6H PRN    Or    acetaminophen (TYLENOL) suppository 650 mg  650 mg Rectal Q6H PRN     mmHg    pO2, Arterial 59 (L) 80 - 100 mmHg    O2 Sat, Arterial 92 (L) 95 - 99 %    HCO3, Arterial 21 (L) 22 - 26 mmol/L    Base deficit, arterial blood 1.9 mmol/L    O2 Method Nasal Cannula      O2 FLOW RATE, POC 2.00 L/min    FIO2 Arterial 28.0 %    Source Arterial      Site Right Radial      Jermaine Test YES      Carboxyhgb, Arterial 0.2 (L) 1 - 2 %    Methemoglobin, Arterial 0.1 0 - 1.4 %    Oxyhemoglobin 91.2 (L) 95 - 99 %    Performed by: Henry Silverman     Temperature 97.5     Renal Function Panel    Collection Time: 02/06/24  4:39 AM   Result Value Ref Range    Sodium 134 (L) 136 - 145 mmol/L    Potassium 3.4 (L) 3.5 - 5.1 mmol/L    Chloride 105 97 - 108 mmol/L    CO2 22 21 - 32 mmol/L    Anion Gap 7 5 - 15 mmol/L    Glucose 151 (H) 65 - 100 mg/dL    BUN 15 6 - 20 mg/dL    Creatinine 0.57 0.55 - 1.02 mg/dL    Bun/Cre Ratio 26 (H) 12 - 20      Est, Glom Filt Rate >60 >60 ml/min/1.73m2    Calcium 8.0 (L) 8.5 - 10.1 mg/dL    Phosphorus 3.0 2.6 - 4.7 mg/dL    Albumin 2.7 (L) 3.5 - 5.0 g/dL   Magnesium    Collection Time: 02/06/24  4:39 AM   Result Value Ref Range    Magnesium 2.0 1.6 - 2.4 mg/dL   C-Reactive Protein    Collection Time: 02/06/24  4:39 AM   Result Value Ref Range    CRP 1.11 (H) 0.00 - 0.30 mg/dL   Ferritin    Collection Time: 02/06/24  4:39 AM   Result Value Ref Range    Ferritin 88 26 - 388 ng/mL   Lactate Dehydrogenase    Collection Time: 02/06/24  4:39 AM   Result Value Ref Range     81 - 246 U/L   D-Dimer, Quantitative    Collection Time: 02/06/24  4:39 AM   Result Value Ref Range    D-Dimer, Quant 0.66 (H) <0.50 ug/ml(FEU)   Culture, Blood 2    Collection Time: 02/06/24  4:39 AM    Specimen: Blood   Result Value Ref Range    Special Requests No Special Requests      Culture No growth after 10 hours          Assessment and Plan:     Hyponatremia:    Probably related to increased p.o. fluid consumption/salt restriction and use of spironolactone   No history of thiazide use  Low urine sodium

## 2024-02-06 NOTE — PLAN OF CARE
Problem: Discharge Planning  Goal: Discharge to home or other facility with appropriate resources  Outcome: Progressing  Flowsheets (Taken 2/5/2024 [id number] by Edel Mckeon, RN)  Discharge to home or other facility with appropriate resources:   Identify barriers to discharge with patient and caregiver   Arrange for needed discharge resources and transportation as appropriate   Identify discharge learning needs (meds, wound care, etc)   Refer to discharge planning if patient needs post-hospital services based on physician order or complex needs related to functional status, cognitive ability or social support system     Problem: Skin/Tissue Integrity  Goal: Absence of new skin breakdown  Description: 1.  Monitor for areas of redness and/or skin breakdown  2.  Assess vascular access sites hourly  3.  Every 4-6 hours minimum:  Change oxygen saturation probe site  4.  Every 4-6 hours:  If on nasal continuous positive airway pressure, respiratory therapy assess nares and determine need for appliance change or resting period.  Outcome: Progressing     Problem: Safety - Adult  Goal: Free from fall injury  Outcome: Progressing

## 2024-02-06 NOTE — PROGRESS NOTES
Baricitinib Criteria Evaluation  Lashon Vanegas is a 79 y.o. female with COVID-19. Pharmacy was consulted by Dr. Snell to assess whether patient meets [facility] criteria to initiate baricitinib.     Inclusion criteria (all answers must be Yes)  Proven COVID-19 (documented positive test result): Yes  Requiring supplemental oxygen therapy: Yes  Elevated inflammatory markers (ANY elevation in CRP, D-dimer, LDH, or ferritin): Yes      Recent Labs     02/06/24  0439   CRP 1.11*         Concomitant therapy with dexamethasone 6-20 mg IV/PO daily (or equivalent steroid): Yes    Exclusion criteria (all answers must be No)  Received tocilizumab: No  eGFR < 15 mL/min or on dialysis: No          Assessment/Plan:   Start Baricitinib 4mg daily x 14 days

## 2024-02-06 NOTE — PROGRESS NOTES
Pulmonary and Critical Care progress note    Subjective:     Chief Complaint:   Chief Complaint   Patient presents with    Shortness of Breath    Fatigue    Positive For Covid-19      Patient seen and examined in her room on the floor this morning, no acute events overnight but having some mild hypoxemia.  Currently on 2 L nasal cannula.  Chest x-ray showing some mildly worsened left-sided airspace disease, likely consistent with pneumonia.  Agree with continuing IV azithromycin/Rocephin, mycoplasma testing pending, will check a sputum culture.  Continue Decadron 6 mg IV daily, inflammatory markers and D-dimer all coming down.  TTE pending, given hypoxemia I will consult pharmacy for baricitinib dosing.    Sodium level improved to 134 from 128, potassium level 3.4, getting oral KCl today.  Recommend proning given hypoxemia.  Patient will need a walking O2 test prior to discharge.        Review of Systems:  Pertinent items are noted in HPI.                Current Facility-Administered Medications   Medication Dose Route Frequency Provider Last Rate Last Admin    cefTRIAXone (ROCEPHIN) 1,000 mg in sterile water 10 mL IV syringe  1,000 mg IntraVENous Q24H Sravan Nelson MD   1,000 mg at 02/06/24 0217    azithromycin (ZITHROMAX) 500 mg in sodium chloride 0.9 % 250 mL IVPB ([id number])  500 mg IntraVENous Q24H Sravan Nelson MD   Stopped at 02/06/24 0323    0.9 % sodium chloride infusion   IntraVENous Continuous Lyric Stanley MD 60 mL/hr at 02/05/24 1315 New Bag at 02/05/24 1315    dextromethorphan (DELSYM) 30 MG/5ML extended release liquid 30 mg  30 mg Oral 2 times per day Paulo Tubbs MD   30 mg at 02/06/24 0941    guaiFENesin (MUCINEX) extended release tablet 600 mg  600 mg Oral BID Paulo Tubbs MD   600 mg at 02/06/24 0942    apixaban (ELIQUIS) tablet 5 mg  5 mg Oral BID Tisha Cisneros APRN - NP   5 mg at 02/06/24 0941    dilTIAZem (CARDIZEM) tablet 30 mg  30 mg Oral 4 times per day Blayne  [Urine:1000]    Physical Exam:     General: Lying in bed comfortably, no acute distress, currently on 2 L nasal cannula  Eye: Reactive, symmetric  Throat and Neck: Supple  Lung: Reduced air entry bilaterally with prolonged exhalation but no wheezing.  Occasional crackles.  Heart: S1+S2.  No murmurs  Abdomen: soft, non-tender. Bowel sounds normal. No masses; obese  Extremities: No edema  : Not done  Skin: No cyanosis  Neurologic: A & O x3.  Grossly nonfocal  Psychiatric: Appropriate affect; coherent      Lab/Data Review:          Recent Results (from the past 24 hour(s))   Blood Gas, Arterial    Collection Time: 02/05/24 10:50 PM   Result Value Ref Range    pH, Arterial 7.47 (H) 7.35 - 7.45      pCO2, Arterial 30 (L) 35 - 45 mmHg    pO2, Arterial 59 (L) 80 - 100 mmHg    O2 Sat, Arterial 92 (L) 95 - 99 %    HCO3, Arterial 21 (L) 22 - 26 mmol/L    Base deficit, arterial blood 1.9 mmol/L    O2 Method Nasal Cannula      O2 FLOW RATE, POC 2.00 L/min    FIO2 Arterial 28.0 %    Source Arterial      Site Right Radial      Jermaine Test YES      Carboxyhgb, Arterial 0.2 (L) 1 - 2 %    Methemoglobin, Arterial 0.1 0 - 1.4 %    Oxyhemoglobin 91.2 (L) 95 - 99 %    Performed by: Henry Silverman     Temperature 97.5     Renal Function Panel    Collection Time: 02/06/24  4:39 AM   Result Value Ref Range    Sodium 134 (L) 136 - 145 mmol/L    Potassium 3.4 (L) 3.5 - 5.1 mmol/L    Chloride 105 97 - 108 mmol/L    CO2 22 21 - 32 mmol/L    Anion Gap 7 5 - 15 mmol/L    Glucose 151 (H) 65 - 100 mg/dL    BUN 15 6 - 20 mg/dL    Creatinine 0.57 0.55 - 1.02 mg/dL    Bun/Cre Ratio 26 (H) 12 - 20      Est, Glom Filt Rate >60 >60 ml/min/1.73m2    Calcium 8.0 (L) 8.5 - 10.1 mg/dL    Phosphorus 3.0 2.6 - 4.7 mg/dL    Albumin 2.7 (L) 3.5 - 5.0 g/dL   Magnesium    Collection Time: 02/06/24  4:39 AM   Result Value Ref Range    Magnesium 2.0 1.6 - 2.4 mg/dL   C-Reactive Protein    Collection Time: 02/06/24  4:39 AM   Result Value Ref Range    CRP 1.11 (H)  discussed in detail with RN, RT, and care team  Thank you for involving me in the care of this patient  I will follow with you closely during hospitalization    Time spent more than 30 minutes in direct patient care with no overlap reviewing results and records, decision making, and answering questions.      Иван Snell DO  [facility] ([facility])  2/6/2024  11:58 AM

## 2024-02-06 NOTE — PROGRESS NOTES
Patient DCP is home alone, but CM awaiting PT/OT recs to determine safest discharge plan. Also awaiting echo.

## 2024-02-06 NOTE — PROGRESS NOTES
Hospitalist Progress Note               Daily Progress Note: 2/6/2024      Chief complaint:   Chief Complaint   Patient presents with    Shortness of Breath    Fatigue    Positive For Covid-19        Subjective:   Hospital course to date:  Lashon Vanegas is a 79 y.o. female presents to the emergency department for evaluation of generalized weakness fatigue, difficulty getting around her home for the last 2 to 3 days.  Patient was diagnosed with COVID-19 approximately 5 days ago was started on Paxlovid.  Patient states she has had significant abdominal discomfort nausea and diarrhea since then.  Has not eaten anything significant.  No noted any recurrent fevers, no trouble breathing, positive dry cough nonproductive.  Denies any chest pains or loss of consciousness. She was transferred from the ER to the 4th floor on 2/4.     As of the evening of 2/5 patients oxygen saturation dropped into the 80s. She was started on 2L via nasal cannula. Chest x-ray was ordered showing a new left lobar pneumonia. She was started on ceftriaxone and Zithromax.     --------  Patient is seen today for follow-up.    Patient was seen while she sat up in bed 94% on 2L via nasal cannula. She has a new onset pneumonia that was confirmed via x-ray last night. Currently on antibiotics. Currently maintaining therapy for atrial fibrillation and for clearance of lung infection. Her hyponatremia is correcting adequately measured at 134.  Patient requests physical therapy in her room. She looks forward to discharge once ECHO results are provided and pulmonology clears her for outpatient completion of there antibiotic therapy.       She reported overall improved respiratory symptoms along with semisolid stools. She reports poor sleep last night due to a nightmare. Lower extremity duplex shows no sign of DVT in the legs. EKG as of 2/4 shows atrial fibrillation along with blood work showing hyponatremia at 128 as of 2/5.       Medications  reviewed  Current Facility-Administered Medications   Medication Dose Route Frequency    cefTRIAXone (ROCEPHIN) 1,000 mg in sterile water 10 mL IV syringe  1,000 mg IntraVENous Q24H    azithromycin (ZITHROMAX) 500 mg in sodium chloride 0.9 % 250 mL IVPB ([id number])  500 mg IntraVENous Q24H    0.9 % sodium chloride infusion   IntraVENous Continuous    dextromethorphan (DELSYM) 30 MG/5ML extended release liquid 30 mg  30 mg Oral 2 times per day    guaiFENesin (MUCINEX) extended release tablet 600 mg  600 mg Oral BID    apixaban (ELIQUIS) tablet 5 mg  5 mg Oral BID    dilTIAZem (CARDIZEM) tablet 30 mg  30 mg Oral 4 times per day    sodium chloride flush 0.9 % injection 5-40 mL  5-40 mL IntraVENous PRN    0.9 % sodium chloride infusion   IntraVENous PRN    magnesium sulfate 2000 mg in 50 mL IVPB premix  2,000 mg IntraVENous PRN    ondansetron (ZOFRAN-ODT) disintegrating tablet 4 mg  4 mg Oral Q8H PRN    Or    ondansetron (ZOFRAN) injection 4 mg  4 mg IntraVENous Q6H PRN    polyethylene glycol (GLYCOLAX) packet 17 g  17 g Oral Daily PRN    sodium chloride flush 0.9 % injection 5-40 mL  5-40 mL IntraVENous 2 times per day    sodium chloride flush 0.9 % injection 5-40 mL  5-40 mL IntraVENous PRN    0.9 % sodium chloride infusion   IntraVENous PRN    acetaminophen (TYLENOL) tablet 650 mg  650 mg Oral Q6H PRN    Or    acetaminophen (TYLENOL) suppository 650 mg  650 mg Rectal Q6H PRN    dexAMETHasone (PF) (DECADRON) injection 6 mg  6 mg IntraVENous Q24H    senna (SENOKOT) tablet 8.6 mg  1 tablet Oral BID PRN       Review of Systems:   A comprehensive review of systems was negative except for: Respiratory: positive for cough    Objective:   Physical Exam:     BP (!) 144/80   Pulse 91   Temp 97.7 °F (36.5 °C) (Oral)   Resp 18   Ht 1.626 m (5' 4\")   Wt 65.8 kg (145 lb 1 oz)   SpO2 94%   BMI 24.90 kg/m²  O2 Flow Rate (L/min): 2 L/min O2 Device: Nasal cannula    Temp (24hrs), Av.5 °F (36.4 °C), Min:97.3 °F (36.3 °C),  - 100 mmHg    O2 Sat, Arterial 92 (L) 95 - 99 %    HCO3, Arterial 21 (L) 22 - 26 mmol/L    Base deficit, arterial blood 1.9 mmol/L    O2 Method Nasal Cannula      O2 FLOW RATE, POC 2.00 L/min    FIO2 Arterial 28.0 %    Source Arterial      Site Right Radial      Jermaine Test YES      Carboxyhgb, Arterial 0.2 (L) 1 - 2 %    Methemoglobin, Arterial 0.1 0 - 1.4 %    Oxyhemoglobin 91.2 (L) 95 - 99 %    Performed by: Henry Silverman     Temperature 97.5     Renal Function Panel    Collection Time: 02/06/24  4:39 AM   Result Value Ref Range    Sodium 134 (L) 136 - 145 mmol/L    Potassium 3.4 (L) 3.5 - 5.1 mmol/L    Chloride 105 97 - 108 mmol/L    CO2 22 21 - 32 mmol/L    Anion Gap 7 5 - 15 mmol/L    Glucose 151 (H) 65 - 100 mg/dL    BUN 15 6 - 20 mg/dL    Creatinine 0.57 0.55 - 1.02 mg/dL    Bun/Cre Ratio 26 (H) 12 - 20      Est, Glom Filt Rate >60 >60 ml/min/1.73m2    Calcium 8.0 (L) 8.5 - 10.1 mg/dL    Phosphorus 3.0 2.6 - 4.7 mg/dL    Albumin 2.7 (L) 3.5 - 5.0 g/dL   Magnesium    Collection Time: 02/06/24  4:39 AM   Result Value Ref Range    Magnesium 2.0 1.6 - 2.4 mg/dL   C-Reactive Protein    Collection Time: 02/06/24  4:39 AM   Result Value Ref Range    CRP 1.11 (H) 0.00 - 0.30 mg/dL   Lactate Dehydrogenase    Collection Time: 02/06/24  4:39 AM   Result Value Ref Range     81 - 246 U/L   D-Dimer, Quantitative    Collection Time: 02/06/24  4:39 AM   Result Value Ref Range    D-Dimer, Quant 0.66 (H) <0.50 ug/ml(FEU)       XR CHEST 1 VIEW   Final Result   Diffuse left-sided airspace disease. Follow-up to complete resolution in 6/8   weeks.      Vascular duplex lower extremity venous bilateral   Final Result      XR CHEST PORTABLE   Final Result      No acute process on portable chest.                Discussion/MDM:     [] High (any 2)    A. Problems (any 1)  [] Acute/Chronic Illness/injury posing threat to life or bodily function:    [x] Severe exacerbation of chronic illness:

## 2024-02-06 NOTE — PROGRESS NOTES
Hospitalist Progress Note               Daily Progress Note: 2/6/2024      Chief complaint:   Chief Complaint   Patient presents with    Shortness of Breath    Fatigue    Positive For Covid-19        Subjective:   Hospital course to date:  Lashon Vanegas is a 79 y.o. female presents to the emergency department for evaluation of generalized weakness fatigue, difficulty getting around her home for the last 2 to 3 days.  Patient was diagnosed with COVID-19 approximately 5 days ago was started on Paxlovid.  Patient states she has had significant abdominal discomfort nausea and diarrhea since then.  Has not eaten anything significant.  No noted any recurrent fevers, no trouble breathing, positive dry cough nonproductive.  Denies any chest pains or loss of consciousness. She was transferred from the ER to the 4th floor on 2/4.     As of the evening of 2/5 patients oxygen saturation dropped into the 80s. She was started on 2L via nasal cannula. Chest x-ray was ordered showing a new left lobar pneumonia. She was started on ceftriaxone and Zithromax.     --------  Patient is seen today for follow-up.    Patient was seen while she sat up in bed 94% on 2L via nasal cannula. She has a new onset pneumonia that was confirmed via x-ray last night. Currently on antibiotics. Currently maintaining therapy for atrial fibrillation and for clearance of lung infection. Her hyponatremia is correcting adequately measured at 134.  Patient requests physical therapy in her room. She looks forward to discharge once ECHO results are provided and pulmonology clears her for outpatient completion of there antibiotic therapy.       She reported overall improved respiratory symptoms along with semisolid stools. She reports poor sleep last night due to a nightmare. Lower extremity duplex shows no sign of DVT in the legs. EKG as of 2/4 shows atrial fibrillation along with blood work showing hyponatremia at 128 as of 2/5.       Medications  80 - 100 mmHg    O2 Sat, Arterial 92 (L) 95 - 99 %    HCO3, Arterial 21 (L) 22 - 26 mmol/L    Base deficit, arterial blood 1.9 mmol/L    O2 Method Nasal Cannula      O2 FLOW RATE, POC 2.00 L/min    FIO2 Arterial 28.0 %    Source Arterial      Site Right Radial      Jermaine Test YES      Carboxyhgb, Arterial 0.2 (L) 1 - 2 %    Methemoglobin, Arterial 0.1 0 - 1.4 %    Oxyhemoglobin 91.2 (L) 95 - 99 %    Performed by: Henry Silverman     Temperature 97.5     Renal Function Panel    Collection Time: 02/06/24  4:39 AM   Result Value Ref Range    Sodium 134 (L) 136 - 145 mmol/L    Potassium 3.4 (L) 3.5 - 5.1 mmol/L    Chloride 105 97 - 108 mmol/L    CO2 22 21 - 32 mmol/L    Anion Gap 7 5 - 15 mmol/L    Glucose 151 (H) 65 - 100 mg/dL    BUN 15 6 - 20 mg/dL    Creatinine 0.57 0.55 - 1.02 mg/dL    Bun/Cre Ratio 26 (H) 12 - 20      Est, Glom Filt Rate >60 >60 ml/min/1.73m2    Calcium 8.0 (L) 8.5 - 10.1 mg/dL    Phosphorus 3.0 2.6 - 4.7 mg/dL    Albumin 2.7 (L) 3.5 - 5.0 g/dL   Magnesium    Collection Time: 02/06/24  4:39 AM   Result Value Ref Range    Magnesium 2.0 1.6 - 2.4 mg/dL   C-Reactive Protein    Collection Time: 02/06/24  4:39 AM   Result Value Ref Range    CRP 1.11 (H) 0.00 - 0.30 mg/dL   Lactate Dehydrogenase    Collection Time: 02/06/24  4:39 AM   Result Value Ref Range     81 - 246 U/L   D-Dimer, Quantitative    Collection Time: 02/06/24  4:39 AM   Result Value Ref Range    D-Dimer, Quant 0.66 (H) <0.50 ug/ml(FEU)       XR CHEST 1 VIEW   Final Result   Diffuse left-sided airspace disease. Follow-up to complete resolution in 6/8   weeks.      Vascular duplex lower extremity venous bilateral   Final Result      XR CHEST PORTABLE   Final Result      No acute process on portable chest.                Discussion/MDM:     [] High (any 2)    A. Problems (any 1)  [] Acute/Chronic Illness/injury posing threat to life or bodily function:    [x] Severe exacerbation of chronic illness:

## 2024-02-07 LAB
ALBUMIN SERPL-MCNC: 2.5 G/DL (ref 3.5–5)
ANION GAP SERPL CALC-SCNC: 8 MMOL/L (ref 5–15)
BASOPHILS # BLD: 0 K/UL (ref 0–0.1)
BASOPHILS NFR BLD: 0 % (ref 0–1)
BUN SERPL-MCNC: 15 MG/DL (ref 6–20)
BUN/CREAT SERPL: 26 (ref 12–20)
CA-I BLD-MCNC: 8.2 MG/DL (ref 8.5–10.1)
CHLORIDE SERPL-SCNC: 105 MMOL/L (ref 97–108)
CO2 SERPL-SCNC: 21 MMOL/L (ref 21–32)
CREAT SERPL-MCNC: 0.58 MG/DL (ref 0.55–1.02)
DIFFERENTIAL METHOD BLD: ABNORMAL
ECHO AO ASC DIAM: 2.6 CM
ECHO AO ASCENDING AORTA INDEX: 1.52 CM/M2
ECHO AO ROOT DIAM: 3.1 CM
ECHO AO ROOT INDEX: 1.81 CM/M2
ECHO AR MAX VEL PISA: 3.2 M/S
ECHO AV AREA PEAK VELOCITY: 1.7 CM2
ECHO AV AREA VTI: 2.1 CM2
ECHO AV AREA/BSA PEAK VELOCITY: 1 CM2/M2
ECHO AV AREA/BSA VTI: 1.2 CM2/M2
ECHO AV MEAN GRADIENT: 6 MMHG
ECHO AV MEAN VELOCITY: 1 M/S
ECHO AV PEAK GRADIENT: 15 MMHG
ECHO AV PEAK VELOCITY: 2 M/S
ECHO AV REGURGITANT PHT: 147 MS
ECHO AV VELOCITY RATIO: 0.6
ECHO AV VTI: 25 CM
ECHO BSA: 1.69 M2
ECHO EST RA PRESSURE: 3 MMHG
ECHO HV DIASTOLIC PEAK VELOCITY: 150 CM/S
ECHO HV SYSTOLIC TO DIASTOLIC VELOCITY RATIO: 0.5 NO UNITS
ECHO HV SYTOLIC PEAK VELOCITY: 79 CM/S
ECHO LA AREA 2C: 19.5 CM2
ECHO LA AREA 4C: 12.2 CM2
ECHO LA DIAMETER INDEX: 1.87 CM/M2
ECHO LA DIAMETER: 3.2 CM
ECHO LA MAJOR AXIS: 4 CM
ECHO LA MINOR AXIS: 5.9 CM
ECHO LA TO AORTIC ROOT RATIO: 1.03
ECHO LA VOL MOD A2C: 50 ML (ref 22–52)
ECHO LA VOL MOD A4C: 29 ML (ref 22–52)
ECHO LA VOLUME INDEX MOD A2C: 29 ML/M2 (ref 16–34)
ECHO LA VOLUME INDEX MOD A4C: 17 ML/M2 (ref 16–34)
ECHO LV E' LATERAL VELOCITY: 12 CM/S
ECHO LV E' SEPTAL VELOCITY: 12 CM/S
ECHO LV EDV A2C: 35 ML
ECHO LV EDV A4C: 34 ML
ECHO LV EDV INDEX A4C: 20 ML/M2
ECHO LV EDV NDEX A2C: 20 ML/M2
ECHO LV EJECTION FRACTION A2C: 83 %
ECHO LV EJECTION FRACTION A4C: 34 %
ECHO LV EJECTION FRACTION BIPLANE: 65 % (ref 55–100)
ECHO LV ESV A2C: 6 ML
ECHO LV ESV A4C: 23 ML
ECHO LV ESV INDEX A2C: 4 ML/M2
ECHO LV ESV INDEX A4C: 13 ML/M2
ECHO LV FRACTIONAL SHORTENING: 33 % (ref 28–44)
ECHO LV INTERNAL DIMENSION DIASTOLE INDEX: 1.75 CM/M2
ECHO LV INTERNAL DIMENSION DIASTOLIC: 3 CM (ref 3.9–5.3)
ECHO LV INTERNAL DIMENSION SYSTOLIC INDEX: 1.17 CM/M2
ECHO LV INTERNAL DIMENSION SYSTOLIC: 2 CM
ECHO LV IVSD: 1.6 CM (ref 0.6–0.9)
ECHO LV MASS 2D: 157.8 G (ref 67–162)
ECHO LV MASS INDEX 2D: 92.3 G/M2 (ref 43–95)
ECHO LV POSTERIOR WALL DIASTOLIC: 1.4 CM (ref 0.6–0.9)
ECHO LV RELATIVE WALL THICKNESS RATIO: 0.93
ECHO LVOT AREA: 2.8 CM2
ECHO LVOT AV VTI INDEX: 0.74
ECHO LVOT DIAM: 1.9 CM
ECHO LVOT MEAN GRADIENT: 4 MMHG
ECHO LVOT PEAK GRADIENT: 6 MMHG
ECHO LVOT PEAK VELOCITY: 1.2 M/S
ECHO LVOT STROKE VOLUME INDEX: 30.8 ML/M2
ECHO LVOT SV: 52.7 ML
ECHO LVOT VTI: 18.6 CM
ECHO MV A VELOCITY: 0.51 M/S
ECHO MV E VELOCITY: 1.32 M/S
ECHO MV E/A RATIO: 2.59
ECHO MV E/E' LATERAL: 11
ECHO MV E/E' RATIO (AVERAGED): 11
ECHO MV REGURGITANT PEAK GRADIENT: 85 MMHG
ECHO MV REGURGITANT PEAK VELOCITY: 4.6 M/S
ECHO MV REGURGITANT VTIA: 111 CM
ECHO PULMONARY ARTERY END DIASTOLIC PRESSURE: 7 MMHG
ECHO PV MAX VELOCITY: 0.9 M/S
ECHO PV MEAN GRADIENT: 2 MMHG
ECHO PV MEAN VELOCITY: 0.6 M/S
ECHO PV PEAK GRADIENT: 3 MMHG
ECHO PV REGURGITANT MAX VELOCITY: 1.3 M/S
ECHO PV VTI: 13.1 CM
ECHO RA AREA 4C: 10.1 CM2
ECHO RA END SYSTOLIC VOLUME APICAL 4 CHAMBER INDEX BSA: 12 ML/M2
ECHO RA VOLUME BIPLANE METHOD OF DISKS: 20 ML
ECHO RA VOLUME INDEX BP: 12 ML/M2
ECHO RA VOLUME: 20 ML
ECHO RIGHT VENTRICULAR SYSTOLIC PRESSURE (RVSP): 40 MMHG
ECHO RV BASAL DIMENSION: 2.8 CM
ECHO RV FREE WALL PEAK S': 19 CM/S
ECHO RV MID DIMENSION: 2.6 CM
ECHO TV REGURGITANT MAX VELOCITY: 3.04 M/S
ECHO TV REGURGITANT PEAK GRADIENT: 37 MMHG
EOSINOPHIL # BLD: 0 K/UL (ref 0–0.4)
EOSINOPHIL NFR BLD: 0 % (ref 0–7)
ERYTHROCYTE [DISTWIDTH] IN BLOOD BY AUTOMATED COUNT: 15.2 % (ref 11.5–14.5)
GLUCOSE BLD STRIP.AUTO-MCNC: 155 MG/DL (ref 65–100)
GLUCOSE SERPL-MCNC: 131 MG/DL (ref 65–100)
HCT VFR BLD AUTO: 32.4 % (ref 35–47)
HGB BLD-MCNC: 11.3 G/DL (ref 11.5–16)
IMM GRANULOCYTES # BLD AUTO: 0 K/UL
IMM GRANULOCYTES NFR BLD AUTO: 0 %
LYMPHOCYTES # BLD: 1.1 K/UL (ref 0.8–3.5)
LYMPHOCYTES NFR BLD: 12 % (ref 12–49)
MCH RBC QN AUTO: 29 PG (ref 26–34)
MCHC RBC AUTO-ENTMCNC: 34.9 G/DL (ref 30–36.5)
MCV RBC AUTO: 83.1 FL (ref 80–99)
METAMYELOCYTES NFR BLD MANUAL: 3 %
MONOCYTES # BLD: 0.8 K/UL (ref 0–1)
MONOCYTES NFR BLD: 9 % (ref 5–13)
MYELOCYTES NFR BLD MANUAL: 3 %
NEUTS BAND NFR BLD MANUAL: 3 % (ref 0–6)
NEUTS SEG # BLD: 6.6 K/UL (ref 1.8–8)
NEUTS SEG NFR BLD: 70 % (ref 32–75)
NRBC # BLD: 0 K/UL (ref 0–0.01)
NRBC BLD-RTO: 0 PER 100 WBC
PERFORMED BY:: ABNORMAL
PHOSPHATE SERPL-MCNC: 2.5 MG/DL (ref 2.6–4.7)
PLATELET # BLD AUTO: 240 K/UL (ref 150–400)
PMV BLD AUTO: 12.8 FL (ref 8.9–12.9)
POTASSIUM SERPL-SCNC: 4.1 MMOL/L (ref 3.5–5.1)
RBC # BLD AUTO: 3.9 M/UL (ref 3.8–5.2)
RBC MORPH BLD: ABNORMAL
SODIUM SERPL-SCNC: 134 MMOL/L (ref 136–145)
WBC # BLD AUTO: 9 K/UL (ref 3.6–11)

## 2024-02-07 PROCEDURE — 85025 COMPLETE CBC W/AUTO DIFF WBC: CPT

## 2024-02-07 PROCEDURE — 6370000000 HC RX 637 (ALT 250 FOR IP): Performed by: INTERNAL MEDICINE

## 2024-02-07 PROCEDURE — 80069 RENAL FUNCTION PANEL: CPT

## 2024-02-07 PROCEDURE — 6370000000 HC RX 637 (ALT 250 FOR IP)

## 2024-02-07 PROCEDURE — 2580000003 HC RX 258: Performed by: HOSPITALIST

## 2024-02-07 PROCEDURE — 2580000003 HC RX 258: Performed by: INTERNAL MEDICINE

## 2024-02-07 PROCEDURE — 6360000002 HC RX W HCPCS: Performed by: INTERNAL MEDICINE

## 2024-02-07 PROCEDURE — 6370000000 HC RX 637 (ALT 250 FOR IP): Performed by: NURSE PRACTITIONER

## 2024-02-07 PROCEDURE — 2060000000 HC ICU INTERMEDIATE R&B

## 2024-02-07 PROCEDURE — 82962 GLUCOSE BLOOD TEST: CPT

## 2024-02-07 PROCEDURE — 6360000002 HC RX W HCPCS: Performed by: HOSPITALIST

## 2024-02-07 PROCEDURE — 2500000003 HC RX 250 WO HCPCS: Performed by: INTERNAL MEDICINE

## 2024-02-07 PROCEDURE — 97110 THERAPEUTIC EXERCISES: CPT

## 2024-02-07 PROCEDURE — 6370000000 HC RX 637 (ALT 250 FOR IP): Performed by: HOSPITALIST

## 2024-02-07 PROCEDURE — 97530 THERAPEUTIC ACTIVITIES: CPT

## 2024-02-07 PROCEDURE — 97161 PT EVAL LOW COMPLEX 20 MIN: CPT

## 2024-02-07 PROCEDURE — 2700000000 HC OXYGEN THERAPY PER DAY

## 2024-02-07 RX ORDER — FUROSEMIDE 40 MG/1
40 TABLET ORAL DAILY
Status: CANCELLED | OUTPATIENT
Start: 2024-02-07

## 2024-02-07 RX ORDER — HYDRALAZINE HYDROCHLORIDE 20 MG/ML
10 INJECTION INTRAMUSCULAR; INTRAVENOUS EVERY 4 HOURS PRN
Status: DISCONTINUED | OUTPATIENT
Start: 2024-02-07 | End: 2024-02-09 | Stop reason: HOSPADM

## 2024-02-07 RX ORDER — HYDROXYZINE PAMOATE 25 MG/1
25 CAPSULE ORAL 3 TIMES DAILY PRN
Status: DISCONTINUED | OUTPATIENT
Start: 2024-02-07 | End: 2024-02-09 | Stop reason: HOSPADM

## 2024-02-07 RX ORDER — FUROSEMIDE 10 MG/ML
20 INJECTION INTRAMUSCULAR; INTRAVENOUS ONCE
Status: COMPLETED | OUTPATIENT
Start: 2024-02-07 | End: 2024-02-07

## 2024-02-07 RX ORDER — LISINOPRIL 5 MG/1
10 TABLET ORAL DAILY
Status: DISCONTINUED | OUTPATIENT
Start: 2024-02-07 | End: 2024-02-08

## 2024-02-07 RX ADMIN — HYDROXYZINE PAMOATE 25 MG: 25 CAPSULE ORAL at 16:15

## 2024-02-07 RX ADMIN — SODIUM PHOSPHATE, MONOBASIC, MONOHYDRATE AND SODIUM PHOSPHATE, DIBASIC, ANHYDROUS 20 MMOL: 142; 276 INJECTION, SOLUTION INTRAVENOUS at 12:00

## 2024-02-07 RX ADMIN — METOPROLOL TARTRATE 25 MG: 25 TABLET, FILM COATED ORAL at 20:28

## 2024-02-07 RX ADMIN — LISINOPRIL 10 MG: 5 TABLET ORAL at 20:28

## 2024-02-07 RX ADMIN — DILTIAZEM HYDROCHLORIDE 60 MG: 30 TABLET, FILM COATED ORAL at 12:00

## 2024-02-07 RX ADMIN — APIXABAN 5 MG: 5 TABLET, FILM COATED ORAL at 20:30

## 2024-02-07 RX ADMIN — DILTIAZEM HYDROCHLORIDE 60 MG: 30 TABLET, FILM COATED ORAL at 01:17

## 2024-02-07 RX ADMIN — SODIUM CHLORIDE, PRESERVATIVE FREE 10 ML: 5 INJECTION INTRAVENOUS at 20:28

## 2024-02-07 RX ADMIN — Medication 30 MG: at 08:09

## 2024-02-07 RX ADMIN — FUROSEMIDE 20 MG: 10 INJECTION, SOLUTION INTRAMUSCULAR; INTRAVENOUS at 16:15

## 2024-02-07 RX ADMIN — GUAIFENESIN 600 MG: 600 TABLET, EXTENDED RELEASE ORAL at 20:28

## 2024-02-07 RX ADMIN — GUAIFENESIN 600 MG: 600 TABLET, EXTENDED RELEASE ORAL at 08:09

## 2024-02-07 RX ADMIN — DEXAMETHASONE SODIUM PHOSPHATE 6 MG: 10 INJECTION INTRAMUSCULAR; INTRAVENOUS at 12:00

## 2024-02-07 RX ADMIN — CEFTRIAXONE SODIUM 1000 MG: 1 INJECTION, POWDER, FOR SOLUTION INTRAMUSCULAR; INTRAVENOUS at 01:18

## 2024-02-07 RX ADMIN — AZITHROMYCIN MONOHYDRATE 500 MG: 500 INJECTION, POWDER, LYOPHILIZED, FOR SOLUTION INTRAVENOUS at 01:18

## 2024-02-07 RX ADMIN — HYDRALAZINE HYDROCHLORIDE 10 MG: 20 INJECTION, SOLUTION INTRAMUSCULAR; INTRAVENOUS at 06:04

## 2024-02-07 RX ADMIN — Medication 30 MG: at 20:28

## 2024-02-07 RX ADMIN — DILTIAZEM HYDROCHLORIDE 60 MG: 30 TABLET, FILM COATED ORAL at 06:03

## 2024-02-07 RX ADMIN — APIXABAN 5 MG: 5 TABLET, FILM COATED ORAL at 08:09

## 2024-02-07 RX ADMIN — BARICITINIB 4 MG: 2 TABLET, FILM COATED ORAL at 08:09

## 2024-02-07 RX ADMIN — SODIUM CHLORIDE, PRESERVATIVE FREE 10 ML: 5 INJECTION INTRAVENOUS at 08:09

## 2024-02-07 RX ADMIN — DILTIAZEM HYDROCHLORIDE 60 MG: 30 TABLET, FILM COATED ORAL at 18:10

## 2024-02-07 NOTE — PLAN OF CARE
Speech LAnguage Pathology Dysphagia EVALUATION    Patient: Lashon Vanegas (79 y.o. female)  Date: 2/6/2024  Primary Diagnosis: Hyponatremia [E87.1]  COVID-19 [U07.1]       Precautions: aspiration, droplet plus                      Diet recommendations: Regular and thin liquids  Swallow precautions: STRICT aspiration/GERD precautions, intermittent supervision with po, monitor pt closely for s/sx aspiration, meds crushed if able in applesauce or pudding, FEED ONLY IF AWAKE AND ALERT. Frequent breaks during meals as required to recover s/t respiratory. SLOW rate of intake, small sips, small bites. Maintain HOB positioning upright for at least 30 minutes after meals.     MBS if/as medically indicated.   RD consult if/as medically indicated.     ASSESSMENT :  Based on the objective data described below, the patient presents with mild pharyngeal dysphagia.  Admitted for hyponatremia and COVID-19. Current diet orders for regular/thin. Per RN: patient is having no difficulty tolerating current diet but has no appetite. Pulmonology noted and recent CXR results appreciated.   Patient alert, sitting upright in bed. Oriented x3. Patient appears very anxious, intermittent whole body tremors. 2L NC in place. WOB noted. Reports continued coughing, not productive; nausea/vomiting improved.   Denies hx dysphagia or receiving ST. Some perseverations and decreased attention/concentration noted.   Agreeable to po trials of thin liquid only, s/t no appetite. Verbal cues to take breaks between sips due to respiratory status/WOB. Oral phase WFL with all trials observed. Pharyngeal phase c/b mild delay in initiation of swallow; once initiated, HLE appears reduced but functional to palpation. No overt s/sx aspiration/penetration observed.     Patient will benefit from skilled intervention to address the above impairments.      Problem: SLP Adult - Impaired Swallowing  Goal: By Discharge: Advance to least restrictive diet without signs or  symptoms of aspiration for planned discharge setting.  See evaluation for individualized goals.  Description: Speech Therapy Swallow Goals    Initiated 2/6/2024    -Patient stated goal: get better  -Patient will tolerate Regular and thin liquids diet without clinical indicators of aspiration given min cues within 7 day(s).      -Patient will tolerate PO trials without clinical indicators of aspiration given min cues within 7 day(s).    -Patient will participate in modified barium swallow study within 7 day(s).    -Patient will demonstrate understanding of swallow safety precautions and aspiration precautions, diet recs with min cues within 7 day(s).        Note: initial      PLAN :  Recommendations and Planned Interventions:  Diet: Regular and thin liquids  STRICT aspiration/GERD precautions, intermittent supervision with po, monitor pt closely for s/sx aspiration, meds crushed if able in applesauce or pudding, FEED ONLY IF AWAKE AND ALERT. Frequent breaks during meals as required to recover s/t respiratory. SLOW rate of intake, small sips, small bites. Maintain HOB positioning upright for at least 30 minutes after meals.   MBS if/as medically indicated.   RD consult if/as medically indicated.        Acute SLP Services: Yes, patient will be followed by speech-language pathology 3x/week to address goals. Patient's rehabilitation potential is considered to be Good.    Discharge Recommendations: To Be Determined     SUBJECTIVE:   Agreeable to bedside swallow evaluation.     OBJECTIVE:     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 10/4/2020    Eye problems     Gout     Hx of colonoscopy approx 2000    Hypertension     Mixed hyperlipidemia 10/4/2020    Nausea & vomiting     Osteopenia 10/4/2020

## 2024-02-07 NOTE — PROGRESS NOTES
Unable to do ambulating pox at this time due to patient being weak, patient also has  elevated heart rate with movement.

## 2024-02-07 NOTE — PROGRESS NOTES
Review of Systems    Physical Exam  Skin:     General: Skin is warm and dry.      Capillary Refill: Capillary refill takes 2 to 3 seconds.

## 2024-02-07 NOTE — PROGRESS NOTES
Renal Progress Note    Patient: Lashon Vanegas MRN: [id number]  SSN: [id number]    YOB: 1945  Age: 79 y.o.  Sex: female      Admit Date: 2/3/2024    LOS: 4 days     Subjective:   Patient seen at bedside. Alert and awake, no acute distress.   Patient is feeling better, not giving any new complaints today.   No complaints of any swelling in lower extremities.   Repeat labs showed a sodium improved to 134 today  isotonic saline decreased to 60 mL/h because of elevated blood pressure      Current Facility-Administered Medications   Medication Dose Route Frequency    hydrALAZINE (APRESOLINE) injection 10 mg  10 mg IntraVENous Q4H PRN    hydrOXYzine pamoate (VISTARIL) capsule 25 mg  25 mg Oral TID PRN    cefTRIAXone (ROCEPHIN) 1,000 mg in sterile water 10 mL IV syringe  1,000 mg IntraVENous Q24H    azithromycin (ZITHROMAX) 500 mg in sodium chloride 0.9 % 250 mL IVPB ([id number])  500 mg IntraVENous Q24H    baricitinib (OLUMIANT) tablet 4 mg  4 mg Oral Daily    dilTIAZem (CARDIZEM) tablet 60 mg  60 mg Oral 4 times per day    dextromethorphan (DELSYM) 30 MG/5ML extended release liquid 30 mg  30 mg Oral 2 times per day    guaiFENesin (MUCINEX) extended release tablet 600 mg  600 mg Oral BID    apixaban (ELIQUIS) tablet 5 mg  5 mg Oral BID    sodium chloride flush 0.9 % injection 5-40 mL  5-40 mL IntraVENous PRN    0.9 % sodium chloride infusion   IntraVENous PRN    magnesium sulfate 2000 mg in 50 mL IVPB premix  2,000 mg IntraVENous PRN    ondansetron (ZOFRAN-ODT) disintegrating tablet 4 mg  4 mg Oral Q8H PRN    Or    ondansetron (ZOFRAN) injection 4 mg  4 mg IntraVENous Q6H PRN    polyethylene glycol (GLYCOLAX) packet 17 g  17 g Oral Daily PRN    sodium chloride flush 0.9 % injection 5-40 mL  5-40 mL IntraVENous 2 times per day    sodium chloride flush 0.9 % injection 5-40 mL  5-40 mL IntraVENous PRN    0.9 % sodium chloride infusion   IntraVENous PRN    acetaminophen (TYLENOL) tablet 650 mg  650 mg Oral Q6H PRN     Chloride 105 97 - 108 mmol/L    CO2 21 21 - 32 mmol/L    Anion Gap 8 5 - 15 mmol/L    Glucose 131 (H) 65 - 100 mg/dL    BUN 15 6 - 20 mg/dL    Creatinine 0.58 0.55 - 1.02 mg/dL    Bun/Cre Ratio 26 (H) 12 - 20      Est, Glom Filt Rate >60 >60 ml/min/1.73m2    Calcium 8.2 (L) 8.5 - 10.1 mg/dL    Phosphorus 2.5 (L) 2.6 - 4.7 mg/dL    Albumin 2.5 (L) 3.5 - 5.0 g/dL   CBC with Auto Differential    Collection Time: 02/07/24  5:52 AM   Result Value Ref Range    WBC 9.0 3.6 - 11.0 K/uL    RBC 3.90 3.80 - 5.20 M/uL    Hemoglobin 11.3 (L) 11.5 - 16.0 g/dL    Hematocrit 32.4 (L) 35.0 - 47.0 %    MCV 83.1 80.0 - 99.0 FL    MCH 29.0 26.0 - 34.0 PG    MCHC 34.9 30.0 - 36.5 g/dL    RDW 15.2 (H) 11.5 - 14.5 %    Platelets 240 150 - 400 K/uL    MPV 12.8 8.9 - 12.9 FL    Nucleated RBCs 0.0 0.0  WBC    nRBC 0.00 0.00 - 0.01 K/uL    Neutrophils % 70 32 - 75 %    Band Neutrophils 3 0 - 6 %    Lymphocytes % 12 12 - 49 %    Monocytes % 9 5 - 13 %    Eosinophils % 0 0 - 7 %    Basophils % 0 0 - 1 %    Metamyelocytes 3 (H) 0 %    Myelocytes 3 (H) 0 %    Immature Granulocytes 0 %    Neutrophils Absolute 6.6 1.8 - 8.0 K/UL    Lymphocytes Absolute 1.1 0.8 - 3.5 K/UL    Monocytes Absolute 0.8 0.0 - 1.0 K/UL    Eosinophils Absolute 0.0 0.0 - 0.4 K/UL    Basophils Absolute 0.0 0.0 - 0.1 K/UL    Absolute Immature Granulocyte 0.0 K/UL    Differential Type Manual      RBC Comment Normocytic, Normochromic          Assessment and Plan:     Hyponatremia:    Probably related to increased p.o. fluid consumption/salt restriction and use of spironolactone   No history of thiazide use  Low urine sodium is suggestive of volume depletion   improving sodium levels with IV normal saline, appropriate rate of correction from 116 to 134 today. I will discontinue isotonic saline  Normal renal function  Recommend strict p.o. fluid restriction to 1200 mL per day to restrict hypotonic fluid intake  Will continue to monitor sodium levels closely and adjust

## 2024-02-07 NOTE — PROGRESS NOTES
CARDIOLOGY PROGRESS NOTE      Patient Name: Lashon Vanegas  Age: 79 y.o.  Gender:female  :1945  MRN: [id number]    Patient seen and examined. This is a patient with a history of  hypertension, hyperlipidemia who presented with weakness now being followed for new onset atrial fibrillation. Continues to endorse shortness of breath, but improving. Denies chest pain. Remains on 2L nasal cannula. No other complaints reported.    Telemetry reviewed, Afib 70s-110s    Pertinent review of systems items noted above, all other systems are negative. Current medications reviewed.    Physical Examination    Allergies   Allergen Reactions    Codeine Other (See Comments)    Morphine      Other reaction(s): Not Reported This Time    Hydrochlorothiazide Rash     Vitals:    24 1410   BP:    Pulse:    Resp:    Temp:    SpO2: 95%     Vital signs are stable  No apparent distress.  Heart has a irregularly irregular rhythm.  + murmur  Lungs are diminished  Abdomen is soft, nontender, normal bowel sounds.  Extremities have no edema  Skin is dry and warm.  Normal affect    Labs reviewed:  Recent Results (from the past 12 hour(s))   Renal Function Panel    Collection Time: 24  5:52 AM   Result Value Ref Range    Sodium 134 (L) 136 - 145 mmol/L    Potassium 4.1 3.5 - 5.1 mmol/L    Chloride 105 97 - 108 mmol/L    CO2 21 21 - 32 mmol/L    Anion Gap 8 5 - 15 mmol/L    Glucose 131 (H) 65 - 100 mg/dL    BUN 15 6 - 20 mg/dL    Creatinine 0.58 0.55 - 1.02 mg/dL    Bun/Cre Ratio 26 (H) 12 - 20      Est, Glom Filt Rate >60 >60 ml/min/1.73m2    Calcium 8.2 (L) 8.5 - 10.1 mg/dL    Phosphorus 2.5 (L) 2.6 - 4.7 mg/dL    Albumin 2.5 (L) 3.5 - 5.0 g/dL   CBC with Auto Differential    Collection Time: 24  5:52 AM   Result Value Ref Range    WBC 9.0 3.6 - 11.0 K/uL    RBC 3.90 3.80 - 5.20 M/uL    Hemoglobin 11.3 (L) 11.5 - 16.0 g/dL    Hematocrit 32.4 (L) 35.0 - 47.0 %    MCV 83.1 80.0 - 99.0 FL    MCH 29.0 26.0 - 34.0 PG    MCHC  34.9 30.0 - 36.5 g/dL    RDW 15.2 (H) 11.5 - 14.5 %    Platelets 240 150 - 400 K/uL    MPV 12.8 8.9 - 12.9 FL    Nucleated RBCs 0.0 0.0  WBC    nRBC 0.00 0.00 - 0.01 K/uL    Neutrophils % 70 32 - 75 %    Band Neutrophils 3 0 - 6 %    Lymphocytes % 12 12 - 49 %    Monocytes % 9 5 - 13 %    Eosinophils % 0 0 - 7 %    Basophils % 0 0 - 1 %    Metamyelocytes 3 (H) 0 %    Myelocytes 3 (H) 0 %    Immature Granulocytes 0 %    Neutrophils Absolute 6.6 1.8 - 8.0 K/UL    Lymphocytes Absolute 1.1 0.8 - 3.5 K/UL    Monocytes Absolute 0.8 0.0 - 1.0 K/UL    Eosinophils Absolute 0.0 0.0 - 0.4 K/UL    Basophils Absolute 0.0 0.0 - 0.1 K/UL    Absolute Immature Granulocyte 0.0 K/UL    Differential Type Manual      RBC Comment Normocytic, Normochromic          Case discussed with Dr. Moreira and our impression and recommendations are as follows:  New onset atrial fibrillation   Remains in Afib, rates 70s-100s  Echo this admission with EF 65-70%, NWM, moderate AR/MR/TR. Will need serial echos to reassess as outpatient   CHADSVASC at least 4-5, continue eliquis  Continue to treat cough, covid  Tolerating increased dose of Diltiazem to 60mg q6  She will need a monitor, sleep study, and EP evaluation as OP  Hypertension, difficult to control per patient, BP acceptable at present  Hyperlipidemia, statin as able  Hyponatremia, defer management to primary  Covid, supportive care per primary       Please do not hesitate to call if additional questions arise.    GREY PEREZ, APRN - NP  2/7/2024

## 2024-02-07 NOTE — PLAN OF CARE
Problem: Discharge Planning  Goal: Discharge to home or other facility with appropriate resources  Outcome: Progressing  Flowsheets (Taken 2/6/2024 3879 by Edel Mckeon, RN)  Discharge to home or other facility with appropriate resources:   Identify barriers to discharge with patient and caregiver   Arrange for needed discharge resources and transportation as appropriate   Identify discharge learning needs (meds, wound care, etc)   Refer to discharge planning if patient needs post-hospital services based on physician order or complex needs related to functional status, cognitive ability or social support system     Problem: Skin/Tissue Integrity  Goal: Absence of new skin breakdown  Description: 1.  Monitor for areas of redness and/or skin breakdown  2.  Assess vascular access sites hourly  3.  Every 4-6 hours minimum:  Change oxygen saturation probe site  4.  Every 4-6 hours:  If on nasal continuous positive airway pressure, respiratory therapy assess nares and determine need for appliance change or resting period.  Outcome: Progressing     Problem: Safety - Adult  Goal: Free from fall injury  Outcome: Progressing

## 2024-02-07 NOTE — PLAN OF CARE
Problem: Discharge Planning  Goal: Discharge to home or other facility with appropriate resources  Outcome: Progressing  Flowsheets (Taken 2/6/2024 2039 by Mame Marcos RN)  Discharge to home or other facility with appropriate resources: Identify barriers to discharge with patient and caregiver     Problem: Skin/Tissue Integrity  Goal: Absence of new skin breakdown  Description: 1.  Monitor for areas of redness and/or skin breakdown  2.  Assess vascular access sites hourly  3.  Every 4-6 hours minimum:  Change oxygen saturation probe site  4.  Every 4-6 hours:  If on nasal continuous positive airway pressure, respiratory therapy assess nares and determine need for appliance change or resting period.  Outcome: Progressing     Problem: Safety - Adult  Goal: Free from fall injury  Outcome: Progressing

## 2024-02-07 NOTE — PROGRESS NOTES
Patient will discharge home alone, no needs at this time. Patient needs PT/OT evals to be completed.

## 2024-02-07 NOTE — PROGRESS NOTES
.Progress Note (Hospitalist, Internal Medicine)  IDENTIFYING INFORMATION   PATIENT:  Lashon Vanegas  MRN:  [id number]  ADMIT DATE: 2/3/2024  TIME OF EVALUATION: 2/7/2024 6:19 PM      HISTORY OF PRESENT ILLNESS   Lashon Vanegas is a 79 y.o. female who presents with       SUBJECTIVE     No overnight issues.  Hyponatremia improved.    MEDICATIONS   Medications Prior to Admission  Medications Prior to Admission: diclofenac (VOLTAREN) 75 MG EC tablet, TAKE ONE TABLET BY MOUTH TWICE A DAY AS NEEDED FOR PAIN  APPLE CIDER VINEGAR PO, Take by mouth  Misc Natural Products (YUMVS BEET ROOT-TART CHERRY PO), Take by mouth  Calcium Carbonate-Vitamin D (CALCIUM-VITAMIN D3 PO), Take by mouth  TURMERIC PO, Take by mouth  amLODIPine-benazepril (LOTREL) 10-40 MG per capsule, Take 1 capsule by mouth daily  Astaxanthin 4 MG CAPS, Take by mouth  diclofenac sodium (VOLTAREN) 1 % GEL, Apply 2 g topically 4 times daily  furosemide (LASIX) 40 MG tablet, Take 1 tablet by mouth daily  metoprolol succinate (TOPROL XL) 100 MG extended release tablet, Take 1 tablet by mouth daily  Omega-3 Fatty Acids (FISH OIL) 1000 MG capsule, Take 2 capsules by mouth 2 times daily  pravastatin (PRAVACHOL) 20 MG tablet, Take 1 tablet by mouth  prednisoLONE acetate (PRED FORTE) 1 % ophthalmic suspension, ceived the following from [facility]: Outside name: prednisoLONE acetate (PRED FORTE) 1 % ophthalmic suspension  spironolactone (ALDACTONE) 25 MG tablet, Take 1 tablet by mouth daily  tretinoin (RETIN-A) 0.025 % cream, APPLY A THIN LAYER TO AFFECTED AREA(S) OF THE FACE AS NEEDED    Current Medications  Current Facility-Administered Medications   Medication Dose Route Frequency Provider Last Rate Last Admin    hydrALAZINE (APRESOLINE) injection 10 mg  10 mg IntraVENous Q4H PRN Sravan Nelson MD   10 mg at 02/07/24 0604    hydrOXYzine pamoate (VISTARIL) capsule 25 mg  25 mg Oral TID PRN Raj Cardoza MD   25 mg at 02/07/24 1615    metoprolol

## 2024-02-07 NOTE — PLAN OF CARE
PHYSICAL THERAPY EVALUATION  Patient: Lashon Vanegas (79 y.o. female)  Date: 2/7/2024  Primary Diagnosis: Hyponatremia [E87.1]  COVID-19 [U07.1]       Precautions: Fall Risk, Contact Precautions (DROPLET precautions)                      Recommendations for nursing mobility: Out of bed to chair for meals, Encourage HEP in prep for ADLs/mobility; see handout for details, Use of bed/chair alarm for safety, Use of BSC for toileting , AD and gt belt for bed to chair , and Assist x1    In place during session: Peripheral IV, Nasal Cannula 2.5L, External Catheter, and EKG/telemetry     ASSESSMENT  Pt is a 79 y.o. female admitted on 2/3/2024 for generalized weakness, tiredness, increasing cough SOB, poor oral intake; pt currently being treated for hyponatremia, COVID19 . Pt semi supine upon PT arrival, agreeable to evaluation. Pt A&O x 4.  Pt's daughter and grand daughter present at bedside throughout session with pt permission.    Based on the objective data described below, the patient currently presents with impaired functional mobility, decreased independence in ADLs, impaired strength, decreased activity tolerance, and impaired balance. (See below for objective details and assist levels).     Overall pt tolerated session fair today with c/o SOB at rest, increased difficulty with bed mobility, transfers and therex. Pt required SBA for bed mobility and Iesha for transfers. Pt completed sit <> stand without use of AD with Iesha, demonstrates unsteadiness with static stand, requires HHA and trunk support to correct. Pt may benefit from use of LRAD to improve stability with mobility away from bed. Pt HR increased to 142bpm in standing, returned to seated position HR returned to 60s-80s. SpO2 maintained >95% on 2.5L with all bed and functional mobility, PT decreased to 2L seated EOB, spO2 maintained >96% with all LE therex, RT notified. Pt completed seated LE therex (detailed below) without difficulty, c/o SOB and requires    4.  Moving to and from a bed to a chair (including a wheelchair)?   [] 1   [] 2   [x] 3   [] 4   5.  Need to walk in hospital room?   [] 1   [] 2   [x] 3   [] 4   6.  Climbing 3-5 steps with a railing?   [] 1   [x] 2   [] 3   [] 4   © , Trustees of [facility], under license to [employer]. All rights reserved     Score:  Initial:  Most Recent: X (Date: 2024 )   Interpretation of Tool:  Represents activities that are increasingly more difficult (i.e. Bed mobility, Transfers, Gait).  Score 24 23 22-20 19-15 14-10 9-7 6   Modifier CH CI CJ CK CL CM CN         Physical Therapy Evaluation Charge Determination   History Examination Presentation Decision-Making   HIGH Complexity :3+ comorbidities / personal factors will impact the outcome/ POC  HIGH Complexity : 4+ Standardized tests and measures addressing body structure, function, activity limitation and / or participation in recreation  MEDIUM Complexity : Evolving with changing characteristics  Other outcome measures [facility] 6  MEDIUM      Based on the above components, the patient evaluation is determined to be of the following complexity level: MEDIUM    Pain Ratin/10 none reported  Pain Intervention(s):       Activity Tolerance:   Fair , requires rest breaks, and observed shortness of breath on exertion    After treatment patient left in no apparent distress:   Bed locked and in lowest position Patient left in no apparent distress in bed, Call bell within reach, Caregiver / family present, and Side rails x3 and nsg updated.    COMMUNICATION/EDUCATION:   The patient’s plan of care was discussed with: Registered nurse    Patient Education  Education Given To: Patient;Family  Education Provided: Role of Therapy;Plan of Care;Home Exercise Program;Transfer Training;Energy Conservation  Education Provided Comments: LE HEP, BEFAST  Education Method: Demonstration;Verbal;Teach Back  Barriers to Learning: None  Education Outcome: Verbalized

## 2024-02-07 NOTE — PROGRESS NOTES
Pulmonary and Critical Care progress note    Subjective:     Chief Complaint:   Chief Complaint   Patient presents with    Shortness of Breath    Fatigue    Positive For Covid-19      Patient seen and examined in her room on the floor this morning, no acute events overnight but having some mild hypoxemia.  Currently on 2 L nasal cannula.  Continue to wean off supplemental oxygen for goal sats greater than 90% on room air.  Recommend walking O2 test today.  TTE completed but still pending.  Sodium level stable at 134, phosphorus level mildly low at 2.4.  I will give 20 mmol IV sodium phosphorus today.  Positive for mycoplasma, recommend a total of 7 days of azithromycin therapy, can convert to oral therapy at any time.  CBC stable.  Repeat inflammatory markers in the morning if still admitted.          Review of Systems:  Pertinent items are noted in HPI.                Current Facility-Administered Medications   Medication Dose Route Frequency Provider Last Rate Last Admin    hydrALAZINE (APRESOLINE) injection 10 mg  10 mg IntraVENous Q4H PRN Sravan Nelson MD   10 mg at 02/07/24 0604    sodium phosphate 20 mmol in sodium chloride 0.9 % 500 mL IVPB  20 mmol IntraVENous Once Иван Snell,         cefTRIAXone (ROCEPHIN) 1,000 mg in sterile water 10 mL IV syringe  1,000 mg IntraVENous Q24H Sravan Nelson MD   1,000 mg at 02/07/24 0118    azithromycin (ZITHROMAX) 500 mg in sodium chloride 0.9 % 250 mL IVPB ([id number])  500 mg IntraVENous Q24H Sravan Nelson MD   Stopped at 02/07/24 0218    baricitinib (OLUMIANT) tablet 4 mg  4 mg Oral Daily Paulo Tubbs MD   4 mg at 02/07/24 0809    dilTIAZem (CARDIZEM) tablet 60 mg  60 mg Oral 4 times per day Eduin Bridges APRN - NP   60 mg at 02/07/24 0603    dextromethorphan (DELSYM) 30 MG/5ML extended release liquid 30 mg  30 mg Oral 2 times per day Paulo Tubbs MD   30 mg at 02/07/24 0809    guaiFENesin (MUCINEX) extended release tablet 600 mg  600 mg Oral  BID Paulo Tubbs MD   600 mg at 24 0809    apixaban (ELIQUIS) tablet 5 mg  5 mg Oral BID Tisha Cisneros EMMANUEL, APRN - NP   5 mg at 24 0809    sodium chloride flush 0.9 % injection 5-40 mL  5-40 mL IntraVENous PRN Rafael Ferrera MD        0.9 % sodium chloride infusion   IntraVENous PRN Rafael Ferrera MD        magnesium sulfate 2000 mg in 50 mL IVPB premix  2,000 mg IntraVENous PRN Rafael Ferrera MD        ondansetron (ZOFRAN-ODT) disintegrating tablet 4 mg  4 mg Oral Q8H PRN Rafael Ferrera MD        Or    ondansetron (ZOFRAN) injection 4 mg  4 mg IntraVENous Q6H PRN Rafael Ferrera MD        polyethylene glycol (GLYCOLAX) packet 17 g  17 g Oral Daily PRN Rafael Ferrera MD        sodium chloride flush 0.9 % injection 5-40 mL  5-40 mL IntraVENous 2 times per day Rafael Ferrera MD   10 mL at 24 0809    sodium chloride flush 0.9 % injection 5-40 mL  5-40 mL IntraVENous PRN Rafael Ferrera MD        0.9 % sodium chloride infusion   IntraVENous PRN Rafael Ferrera MD        acetaminophen (TYLENOL) tablet 650 mg  650 mg Oral Q6H PRN Rafael Ferrera MD        Or    acetaminophen (TYLENOL) suppository 650 mg  650 mg Rectal Q6H PRN Rafael Ferrera MD        dexAMETHasone (PF) (DECADRON) injection 6 mg  6 mg IntraVENous Q24H Rafael Ferrera MD   6 mg at 24 1345    senna (SENOKOT) tablet 8.6 mg  1 tablet Oral BID PRN Rafael Ferrera MD              Allergies   Allergen Reactions    Codeine Other (See Comments)    Morphine      Other reaction(s): Not Reported This Time    Hydrochlorothiazide Rash           Objective:     Blood pressure (!) 147/87, pulse 73, temperature 97.3 °F (36.3 °C), temperature source Oral, resp. rate 17, height 1.626 m (5' 4\"), weight 65.8 kg (145 lb 1 oz), SpO2 95 %. Temp (24hrs), Av.6 °F (36.4 °C), Min:97.3 °F (36.3 °C), Max:98.2 °F (36.8 °C)      Intake and Output:  Current Shift: 701 -

## 2024-02-08 LAB
ALBUMIN SERPL-MCNC: 2.6 G/DL (ref 3.5–5)
ANION GAP SERPL CALC-SCNC: 6 MMOL/L (ref 5–15)
BACTERIA SPEC CULT: NORMAL
BASOPHILS # BLD: 0 K/UL (ref 0–0.1)
BASOPHILS NFR BLD: 0 % (ref 0–1)
BUN SERPL-MCNC: 19 MG/DL (ref 6–20)
BUN/CREAT SERPL: 31 (ref 12–20)
CA-I BLD-MCNC: 8.1 MG/DL (ref 8.5–10.1)
CHLORIDE SERPL-SCNC: 103 MMOL/L (ref 97–108)
CO2 SERPL-SCNC: 23 MMOL/L (ref 21–32)
CREAT SERPL-MCNC: 0.62 MG/DL (ref 0.55–1.02)
CRP SERPL-MCNC: 0.38 MG/DL (ref 0–0.3)
DIFFERENTIAL METHOD BLD: ABNORMAL
EOSINOPHIL # BLD: 0 K/UL (ref 0–0.4)
EOSINOPHIL NFR BLD: 0 % (ref 0–7)
ERYTHROCYTE [DISTWIDTH] IN BLOOD BY AUTOMATED COUNT: 14.9 % (ref 11.5–14.5)
FERRITIN SERPL-MCNC: 76 NG/ML (ref 8–252)
GLUCOSE SERPL-MCNC: 136 MG/DL (ref 65–100)
GRAM STN SPEC: NORMAL
HCT VFR BLD AUTO: 32.4 % (ref 35–47)
HGB BLD-MCNC: 11.1 G/DL (ref 11.5–16)
IMM GRANULOCYTES # BLD AUTO: 0 K/UL
IMM GRANULOCYTES NFR BLD AUTO: 0 %
LDH SERPL L TO P-CCNC: 227 U/L (ref 81–246)
LYMPHOCYTES # BLD: 0.7 K/UL (ref 0.8–3.5)
LYMPHOCYTES NFR BLD: 9 % (ref 12–49)
Lab: NORMAL
MAGNESIUM SERPL-MCNC: 2.1 MG/DL (ref 1.6–2.4)
MCH RBC QN AUTO: 28.7 PG (ref 26–34)
MCHC RBC AUTO-ENTMCNC: 34.3 G/DL (ref 30–36.5)
MCV RBC AUTO: 83.7 FL (ref 80–99)
MONOCYTES # BLD: 0.8 K/UL (ref 0–1)
MONOCYTES NFR BLD: 10 % (ref 5–13)
NEUTS BAND NFR BLD MANUAL: 4 % (ref 0–6)
NEUTS SEG # BLD: 6.7 K/UL (ref 1.8–8)
NEUTS SEG NFR BLD: 77 % (ref 32–75)
NRBC # BLD: 0.02 K/UL (ref 0–0.01)
NRBC BLD-RTO: 0.2 PER 100 WBC
PHOSPHATE SERPL-MCNC: 3.4 MG/DL (ref 2.6–4.7)
PLATELET # BLD AUTO: 292 K/UL (ref 150–400)
PLATELET COMMENT: ABNORMAL
PMV BLD AUTO: 12.4 FL (ref 8.9–12.9)
POTASSIUM SERPL-SCNC: 3.9 MMOL/L (ref 3.5–5.1)
RBC # BLD AUTO: 3.87 M/UL (ref 3.8–5.2)
RBC MORPH BLD: ABNORMAL
SODIUM SERPL-SCNC: 132 MMOL/L (ref 136–145)
WBC # BLD AUTO: 8.2 K/UL (ref 3.6–11)

## 2024-02-08 PROCEDURE — 2580000003 HC RX 258: Performed by: HOSPITALIST

## 2024-02-08 PROCEDURE — 85025 COMPLETE CBC W/AUTO DIFF WBC: CPT

## 2024-02-08 PROCEDURE — 6370000000 HC RX 637 (ALT 250 FOR IP): Performed by: NURSE PRACTITIONER

## 2024-02-08 PROCEDURE — 6370000000 HC RX 637 (ALT 250 FOR IP): Performed by: INTERNAL MEDICINE

## 2024-02-08 PROCEDURE — 83735 ASSAY OF MAGNESIUM: CPT

## 2024-02-08 PROCEDURE — 2580000003 HC RX 258: Performed by: INTERNAL MEDICINE

## 2024-02-08 PROCEDURE — 6370000000 HC RX 637 (ALT 250 FOR IP): Performed by: HOSPITALIST

## 2024-02-08 PROCEDURE — 82728 ASSAY OF FERRITIN: CPT

## 2024-02-08 PROCEDURE — 2060000000 HC ICU INTERMEDIATE R&B

## 2024-02-08 PROCEDURE — 6370000000 HC RX 637 (ALT 250 FOR IP)

## 2024-02-08 PROCEDURE — 86140 C-REACTIVE PROTEIN: CPT

## 2024-02-08 PROCEDURE — 80069 RENAL FUNCTION PANEL: CPT

## 2024-02-08 PROCEDURE — 6360000002 HC RX W HCPCS: Performed by: INTERNAL MEDICINE

## 2024-02-08 PROCEDURE — 36415 COLL VENOUS BLD VENIPUNCTURE: CPT

## 2024-02-08 PROCEDURE — 6360000002 HC RX W HCPCS: Performed by: HOSPITALIST

## 2024-02-08 PROCEDURE — 83615 LACTATE (LD) (LDH) ENZYME: CPT

## 2024-02-08 RX ORDER — LISINOPRIL 20 MG/1
20 TABLET ORAL DAILY
Status: DISCONTINUED | OUTPATIENT
Start: 2024-02-09 | End: 2024-02-08

## 2024-02-08 RX ORDER — LISINOPRIL 5 MG/1
10 TABLET ORAL DAILY
Status: DISCONTINUED | OUTPATIENT
Start: 2024-02-08 | End: 2024-02-09

## 2024-02-08 RX ADMIN — CEFTRIAXONE SODIUM 1000 MG: 1 INJECTION, POWDER, FOR SOLUTION INTRAMUSCULAR; INTRAVENOUS at 01:30

## 2024-02-08 RX ADMIN — DILTIAZEM HYDROCHLORIDE 60 MG: 30 TABLET, FILM COATED ORAL at 18:50

## 2024-02-08 RX ADMIN — APIXABAN 5 MG: 5 TABLET, FILM COATED ORAL at 08:43

## 2024-02-08 RX ADMIN — DILTIAZEM HYDROCHLORIDE 60 MG: 30 TABLET, FILM COATED ORAL at 12:54

## 2024-02-08 RX ADMIN — Medication 15 G: at 20:37

## 2024-02-08 RX ADMIN — DILTIAZEM HYDROCHLORIDE 60 MG: 30 TABLET, FILM COATED ORAL at 01:27

## 2024-02-08 RX ADMIN — LISINOPRIL 10 MG: 5 TABLET ORAL at 08:43

## 2024-02-08 RX ADMIN — GUAIFENESIN 600 MG: 600 TABLET, EXTENDED RELEASE ORAL at 20:36

## 2024-02-08 RX ADMIN — Medication 15 G: at 10:48

## 2024-02-08 RX ADMIN — SODIUM CHLORIDE, PRESERVATIVE FREE 10 ML: 5 INJECTION INTRAVENOUS at 08:43

## 2024-02-08 RX ADMIN — BARICITINIB 4 MG: 2 TABLET, FILM COATED ORAL at 08:43

## 2024-02-08 RX ADMIN — DILTIAZEM HYDROCHLORIDE 60 MG: 30 TABLET, FILM COATED ORAL at 06:06

## 2024-02-08 RX ADMIN — AZITHROMYCIN MONOHYDRATE 500 MG: 500 INJECTION, POWDER, LYOPHILIZED, FOR SOLUTION INTRAVENOUS at 01:30

## 2024-02-08 RX ADMIN — GUAIFENESIN 600 MG: 600 TABLET, EXTENDED RELEASE ORAL at 08:43

## 2024-02-08 RX ADMIN — METOPROLOL TARTRATE 25 MG: 25 TABLET, FILM COATED ORAL at 08:43

## 2024-02-08 RX ADMIN — APIXABAN 5 MG: 5 TABLET, FILM COATED ORAL at 20:37

## 2024-02-08 RX ADMIN — SODIUM CHLORIDE, PRESERVATIVE FREE 10 ML: 5 INJECTION INTRAVENOUS at 20:37

## 2024-02-08 RX ADMIN — Medication 30 MG: at 10:48

## 2024-02-08 RX ADMIN — Medication 30 MG: at 21:39

## 2024-02-08 RX ADMIN — POLYETHYLENE GLYCOL 3350 17 G: 17 POWDER, FOR SOLUTION ORAL at 08:43

## 2024-02-08 RX ADMIN — DEXAMETHASONE SODIUM PHOSPHATE 6 MG: 10 INJECTION INTRAMUSCULAR; INTRAVENOUS at 12:54

## 2024-02-08 NOTE — PROGRESS NOTES
CARDIOLOGY PROGRESS NOTE      Patient Name: Lashon Vanegas  Age: 79 y.o.  Gender:female  :1945  MRN: [id number]    Patient seen and examined. This is a patient with a history of  hypertension, hyperlipidemia who presented with weakness now being followed for new onset atrial fibrillation. Continues to endorse shortness of breath, but improving. Denies chest pain. No longer on supplemental oxygen. No other complaints reported.    Telemetry reviewed, Afib 70s-110s    Pertinent review of systems items noted above, all other systems are negative. Current medications reviewed.    Physical Examination    Allergies   Allergen Reactions    Codeine Other (See Comments)    Morphine      Other reaction(s): Not Reported This Time    Hydrochlorothiazide Rash     Vitals:    24 1410   BP:    Pulse:    Resp:    Temp:    SpO2: 95%     Vital signs are stable  No apparent distress.  Heart has a irregularly irregular rhythm.  + murmur  Lungs are diminished  Abdomen is soft, nontender, normal bowel sounds.  Extremities have no edema  Skin is dry and warm.  Normal affect    Labs reviewed:  Recent Results (from the past 12 hour(s))   Renal Function Panel    Collection Time: 24  5:52 AM   Result Value Ref Range    Sodium 134 (L) 136 - 145 mmol/L    Potassium 4.1 3.5 - 5.1 mmol/L    Chloride 105 97 - 108 mmol/L    CO2 21 21 - 32 mmol/L    Anion Gap 8 5 - 15 mmol/L    Glucose 131 (H) 65 - 100 mg/dL    BUN 15 6 - 20 mg/dL    Creatinine 0.58 0.55 - 1.02 mg/dL    Bun/Cre Ratio 26 (H) 12 - 20      Est, Glom Filt Rate >60 >60 ml/min/1.73m2    Calcium 8.2 (L) 8.5 - 10.1 mg/dL    Phosphorus 2.5 (L) 2.6 - 4.7 mg/dL    Albumin 2.5 (L) 3.5 - 5.0 g/dL   CBC with Auto Differential    Collection Time: 24  5:52 AM   Result Value Ref Range    WBC 9.0 3.6 - 11.0 K/uL    RBC 3.90 3.80 - 5.20 M/uL    Hemoglobin 11.3 (L) 11.5 - 16.0 g/dL    Hematocrit 32.4 (L) 35.0 - 47.0 %    MCV 83.1 80.0 - 99.0 FL    MCH 29.0 26.0 - 34.0 PG

## 2024-02-08 NOTE — PROGRESS NOTES
Renal Progress Note    Patient: Lashon Vanegas MRN: [id number]  SSN: [id number]    YOB: 1945  Age: 79 y.o.  Sex: female      Admit Date: 2/3/2024    LOS: 5 days     Subjective:   Patient seen at bedside. Alert and awake, no acute distress.   Patient is feeling better, not giving any new complaints today.   No complaints of any swelling in lower extremities.   Repeat labs showed a sodium decreased to 132 today.  IV fluids were discontinued yesterday    Current Facility-Administered Medications   Medication Dose Route Frequency    [START ON 2/9/2024] lisinopril (PRINIVIL;ZESTRIL) tablet 20 mg  20 mg Oral Daily    urea (URE-NA) packet 15 g  15 g Oral BID    hydrALAZINE (APRESOLINE) injection 10 mg  10 mg IntraVENous Q4H PRN    hydrOXYzine pamoate (VISTARIL) capsule 25 mg  25 mg Oral TID PRN    cefTRIAXone (ROCEPHIN) 1,000 mg in sterile water 10 mL IV syringe  1,000 mg IntraVENous Q24H    azithromycin (ZITHROMAX) 500 mg in sodium chloride 0.9 % 250 mL IVPB ([id number])  500 mg IntraVENous Q24H    baricitinib (OLUMIANT) tablet 4 mg  4 mg Oral Daily    dilTIAZem (CARDIZEM) tablet 60 mg  60 mg Oral 4 times per day    dextromethorphan (DELSYM) 30 MG/5ML extended release liquid 30 mg  30 mg Oral 2 times per day    guaiFENesin (MUCINEX) extended release tablet 600 mg  600 mg Oral BID    apixaban (ELIQUIS) tablet 5 mg  5 mg Oral BID    sodium chloride flush 0.9 % injection 5-40 mL  5-40 mL IntraVENous PRN    0.9 % sodium chloride infusion   IntraVENous PRN    magnesium sulfate 2000 mg in 50 mL IVPB premix  2,000 mg IntraVENous PRN    ondansetron (ZOFRAN-ODT) disintegrating tablet 4 mg  4 mg Oral Q8H PRN    Or    ondansetron (ZOFRAN) injection 4 mg  4 mg IntraVENous Q6H PRN    polyethylene glycol (GLYCOLAX) packet 17 g  17 g Oral Daily PRN    sodium chloride flush 0.9 % injection 5-40 mL  5-40 mL IntraVENous 2 times per day    sodium chloride flush 0.9 % injection 5-40 mL  5-40 mL IntraVENous PRN    0.9 % sodium  mg/dL   Lactate Dehydrogenase    Collection Time: 02/08/24  3:06 AM   Result Value Ref Range     81 - 246 U/L   Ferritin    Collection Time: 02/08/24  3:06 AM   Result Value Ref Range    Ferritin 76 8 - 252 ng/mL        Assessment and Plan:     Hyponatremia:    Likely hypovolemic hypoosmolar hyponatremia.  Urine sodium was low  No history of thiazide use  Sodium improved from 122--> 134.  However again decreased to 132 today.  Likely because of isotonic saline been discontinued yesterday  I will start salt tablets 1 g twice daily for 4 doses  Normal renal function  Recommend strict p.o. fluid restriction to 1200 mL per day to restrict hypotonic fluid intake  Will continue to monitor sodium levels closely and adjust management as needed      2.  Hypokalemia:   Potassium is normal after replacement  continue to monitor potassium levels and supplement as needed  Mag level is normal at 2.1     3.  Shortness of breath/recently diagnosed COVID-19 infection  She is feeling better with improvement in her respiratory symptoms   initial chest x-ray was reported in negative for any infiltrates.off ivf  Pulmonary following     4.  Hypertension:   Blood pressure is elevated. Off ivf  Start lisinopril 20 mg.  Continue diltiazem          Signed By: Lyric Stanley MD     February 8, 2024

## 2024-02-08 NOTE — PROGRESS NOTES
Patient usually lives alone, but now has PT recs for SNF vs HH with family care. CM in to speak to patient and her son in law at the bedside. CM informed patient and allowed her to make decision. Patient chose  service. Choice letter completed and referral sent    Patient accepted by Jael . SOC 2/10/24.

## 2024-02-08 NOTE — PROGRESS NOTES
Pulmonary and Critical Care progress note    Subjective:     Chief Complaint:   Chief Complaint   Patient presents with    Shortness of Breath    Fatigue    Positive For Covid-19      Patient seen and examined in her room on the floor this afternoon, no acute events overnight.  Has been weaned to room air.  CBC stable, sodium level relatively stable at 132, CRP trending down.  TTE with an EF of 65 to 70% and moderate aortic/mitral/tricuspid valve regurgitation.  Continue on current antibiotics, baricitinib, and Decadron, CRP trending down today.  Fluids have been discontinued.    Essentially, from a pulmonary standpoint, the patient is cleared for discharge.          Review of Systems:  Pertinent items are noted in HPI.                Current Facility-Administered Medications   Medication Dose Route Frequency Provider Last Rate Last Admin    [START ON 2/9/2024] lisinopril (PRINIVIL;ZESTRIL) tablet 20 mg  20 mg Oral Daily Raj Cardoza MD        urea (URE-NA) packet 15 g  15 g Oral BID Lyric Stanley MD   15 g at 02/08/24 1048    hydrALAZINE (APRESOLINE) injection 10 mg  10 mg IntraVENous Q4H PRN Sravan Nelson MD   10 mg at 02/07/24 0604    hydrOXYzine pamoate (VISTARIL) capsule 25 mg  25 mg Oral TID PRN Raj Cardoza MD   25 mg at 02/07/24 1615    cefTRIAXone (ROCEPHIN) 1,000 mg in sterile water 10 mL IV syringe  1,000 mg IntraVENous Q24H Sravan Nelson MD   1,000 mg at 02/08/24 0130    azithromycin (ZITHROMAX) 500 mg in sodium chloride 0.9 % 250 mL IVPB ([id number])  500 mg IntraVENous Q24H Sravan Nelson MD   Stopped at 02/08/24 0237    baricitinib (OLUMIANT) tablet 4 mg  4 mg Oral Daily Paulo Tubbs MD   4 mg at 02/08/24 0843    dilTIAZem (CARDIZEM) tablet 60 mg  60 mg Oral 4 times per day Eduin Bridges APRN - NP   60 mg at 02/08/24 0606    dextromethorphan (DELSYM) 30 MG/5ML extended release liquid 30 mg  30 mg Oral 2 times per day Paulo Tubbs MD   30 mg at 02/08/24 1048    guaiFENesin  RBC Comment Normocytic, Normochromic     Renal Function Panel    Collection Time: 02/08/24  3:06 AM   Result Value Ref Range    Sodium 132 (L) 136 - 145 mmol/L    Potassium 3.9 3.5 - 5.1 mmol/L    Chloride 103 97 - 108 mmol/L    CO2 23 21 - 32 mmol/L    Anion Gap 6 5 - 15 mmol/L    Glucose 136 (H) 65 - 100 mg/dL    BUN 19 6 - 20 mg/dL    Creatinine 0.62 0.55 - 1.02 mg/dL    Bun/Cre Ratio 31 (H) 12 - 20      Est, Glom Filt Rate >60 >60 ml/min/1.73m2    Calcium 8.1 (L) 8.5 - 10.1 mg/dL    Phosphorus 3.4 2.6 - 4.7 mg/dL    Albumin 2.6 (L) 3.5 - 5.0 g/dL   Magnesium    Collection Time: 02/08/24  3:06 AM   Result Value Ref Range    Magnesium 2.1 1.6 - 2.4 mg/dL   C-Reactive Protein    Collection Time: 02/08/24  3:06 AM   Result Value Ref Range    CRP 0.38 (H) 0.00 - 0.30 mg/dL   Lactate Dehydrogenase    Collection Time: 02/08/24  3:06 AM   Result Value Ref Range     81 - 246 U/L   Ferritin    Collection Time: 02/08/24  3:06 AM   Result Value Ref Range    Ferritin 76 8 - 252 ng/mL       XR CHEST 1 VIEW   Final Result   Diffuse left-sided airspace disease. Follow-up to complete resolution in 6/8   weeks.      Vascular duplex lower extremity venous bilateral   Final Result      XR CHEST PORTABLE   Final Result      No acute process on portable chest.               Assessment:     1.  Acute respiratory failure with hypoxia  2.  COVID-19 infection  3.  Severe hyponatremia  4.  Dehydration  5.  Generalized weakness  6.  Possible CHF  7.  Hypertension    Plan:     Gradual improvement in clinical status    Now on room air again  Will use supplemental oxygen as needed to keep saturation above 90%  Recommend walking O2 test prior to discharge    Essentially, from a pulmonary standpoint, the patient is cleared for discharge.    Patient has COVID-19 infection  Already received Paxlovid  Chest x-ray mostly clear  Continue on Decadron 6 mg IV daily for 10 days, inflammatory markers trending down again today.  132  D-dimer was  elevated on admission, but lower extremity venous Dopplers negative for clots  Dimer level improving on 2/6/2024  Baricitinib started on 2/6/2024 for hypoxemia  No role for CTA chest unless hypoxia worsens.    Severe hyponatremia with sodium of 116 on  Possibly related to psychogenic polydipsia or SIADH  Sodium now improving to 132  Potassium/magnesium/phosphorus normal today  Appreciate nephrology input, continue on normal saline  Intake and output charting  Check electrolytes and replace as needed  Monitor renal function with fluid changes    DVT and GI prophylaxis    Questions of patient were answered at bedside in detail  Case discussed in detail with RN, RT, and care team  Thank you for involving me in the care of this patient  I will follow with you closely during hospitalization    Time spent more than 30 minutes in direct patient care with no overlap reviewing results and records, decision making, and answering questions.      Иван Snell DO  [facility] (PAT)  2/8/2024  12:02 PM

## 2024-02-08 NOTE — PROGRESS NOTES
.Progress Note (Hospitalist, Internal Medicine)  IDENTIFYING INFORMATION   PATIENT:  Lashon Vanegas  MRN:  [id number]  ADMIT DATE: 2/3/2024  TIME OF EVALUATION: 2/8/2024 6:00 PM      HISTORY OF PRESENT ILLNESS   Lashon Vanegas is a 79 y.o. female who presents with       SUBJECTIVE     Persistent with discontinuation of IV fluids.  She reports an improvement in her shortness of breath.  She also had tightness in her hands which she reports has subsided.    MEDICATIONS   Medications Prior to Admission  Medications Prior to Admission: diclofenac (VOLTAREN) 75 MG EC tablet, TAKE ONE TABLET BY MOUTH TWICE A DAY AS NEEDED FOR PAIN  APPLE CIDER VINEGAR PO, Take by mouth  Misc Natural Products (YUMVS BEET ROOT-TART CHERRY PO), Take by mouth  Calcium Carbonate-Vitamin D (CALCIUM-VITAMIN D3 PO), Take by mouth  TURMERIC PO, Take by mouth  amLODIPine-benazepril (LOTREL) 10-40 MG per capsule, Take 1 capsule by mouth daily  Astaxanthin 4 MG CAPS, Take by mouth  diclofenac sodium (VOLTAREN) 1 % GEL, Apply 2 g topically 4 times daily  furosemide (LASIX) 40 MG tablet, Take 1 tablet by mouth daily  metoprolol succinate (TOPROL XL) 100 MG extended release tablet, Take 1 tablet by mouth daily  Omega-3 Fatty Acids (FISH OIL) 1000 MG capsule, Take 2 capsules by mouth 2 times daily  pravastatin (PRAVACHOL) 20 MG tablet, Take 1 tablet by mouth  prednisoLONE acetate (PRED FORTE) 1 % ophthalmic suspension, ceived the following from [facility]: Outside name: prednisoLONE acetate (PRED FORTE) 1 % ophthalmic suspension  spironolactone (ALDACTONE) 25 MG tablet, Take 1 tablet by mouth daily  tretinoin (RETIN-A) 0.025 % cream, APPLY A THIN LAYER TO AFFECTED AREA(S) OF THE FACE AS NEEDED    Current Medications  Current Facility-Administered Medications   Medication Dose Route Frequency Provider Last Rate Last Admin    urea (URE-NA) packet 15 g  15 g Oral BID Lyric Stanley MD   15 g at 02/08/24 1045    lisinopril (PRINIVIL;ZESTRIL)

## 2024-02-09 ENCOUNTER — TELEPHONE (OUTPATIENT)
Facility: CLINIC | Age: 79
End: 2024-02-09

## 2024-02-09 VITALS
RESPIRATION RATE: 18 BRPM | BODY MASS INDEX: 24.77 KG/M2 | OXYGEN SATURATION: 97 % | SYSTOLIC BLOOD PRESSURE: 155 MMHG | HEART RATE: 83 BPM | DIASTOLIC BLOOD PRESSURE: 83 MMHG | HEIGHT: 64 IN | WEIGHT: 145.06 LBS | TEMPERATURE: 97.7 F

## 2024-02-09 LAB
ALBUMIN SERPL-MCNC: 2.7 G/DL (ref 3.5–5)
ANION GAP SERPL CALC-SCNC: 5 MMOL/L (ref 5–15)
BASOPHILS # BLD: 0.1 K/UL (ref 0–0.1)
BASOPHILS NFR BLD: 1 % (ref 0–1)
BUN SERPL-MCNC: 36 MG/DL (ref 6–20)
BUN/CREAT SERPL: 54 (ref 12–20)
CA-I BLD-MCNC: 8.3 MG/DL (ref 8.5–10.1)
CHLORIDE SERPL-SCNC: 101 MMOL/L (ref 97–108)
CO2 SERPL-SCNC: 24 MMOL/L (ref 21–32)
CREAT SERPL-MCNC: 0.67 MG/DL (ref 0.55–1.02)
DIFFERENTIAL METHOD BLD: ABNORMAL
EOSINOPHIL # BLD: 0 K/UL (ref 0–0.4)
EOSINOPHIL NFR BLD: 0 % (ref 0–7)
ERYTHROCYTE [DISTWIDTH] IN BLOOD BY AUTOMATED COUNT: 14.9 % (ref 11.5–14.5)
GLUCOSE SERPL-MCNC: 132 MG/DL (ref 65–100)
HCT VFR BLD AUTO: 33.2 % (ref 35–47)
HGB BLD-MCNC: 11.4 G/DL (ref 11.5–16)
IMM GRANULOCYTES # BLD AUTO: 0 K/UL
IMM GRANULOCYTES NFR BLD AUTO: 0 %
LYMPHOCYTES # BLD: 1.2 K/UL (ref 0.8–3.5)
LYMPHOCYTES NFR BLD: 13 % (ref 12–49)
MAGNESIUM SERPL-MCNC: 2.2 MG/DL (ref 1.6–2.4)
MCH RBC QN AUTO: 28.7 PG (ref 26–34)
MCHC RBC AUTO-ENTMCNC: 34.3 G/DL (ref 30–36.5)
MCV RBC AUTO: 83.6 FL (ref 80–99)
MONOCYTES # BLD: 0.9 K/UL (ref 0–1)
MONOCYTES NFR BLD: 10 % (ref 5–13)
NEUTS BAND NFR BLD MANUAL: 4 % (ref 0–6)
NEUTS SEG # BLD: 7.1 K/UL (ref 1.8–8)
NEUTS SEG NFR BLD: 72 % (ref 32–75)
NRBC # BLD: 0 K/UL (ref 0–0.01)
NRBC BLD-RTO: 0 PER 100 WBC
PHOSPHATE SERPL-MCNC: 3.2 MG/DL (ref 2.6–4.7)
PLATELET # BLD AUTO: 319 K/UL (ref 150–400)
PLATELET COMMENT: ABNORMAL
PMV BLD AUTO: 11.9 FL (ref 8.9–12.9)
POTASSIUM SERPL-SCNC: 4.1 MMOL/L (ref 3.5–5.1)
RBC # BLD AUTO: 3.97 M/UL (ref 3.8–5.2)
RBC MORPH BLD: ABNORMAL
SODIUM SERPL-SCNC: 130 MMOL/L (ref 136–145)
WBC # BLD AUTO: 9.3 K/UL (ref 3.6–11)

## 2024-02-09 PROCEDURE — 2580000003 HC RX 258: Performed by: INTERNAL MEDICINE

## 2024-02-09 PROCEDURE — 6370000000 HC RX 637 (ALT 250 FOR IP): Performed by: NURSE PRACTITIONER

## 2024-02-09 PROCEDURE — 6370000000 HC RX 637 (ALT 250 FOR IP): Performed by: INTERNAL MEDICINE

## 2024-02-09 PROCEDURE — 6360000002 HC RX W HCPCS: Performed by: HOSPITALIST

## 2024-02-09 PROCEDURE — 85025 COMPLETE CBC W/AUTO DIFF WBC: CPT

## 2024-02-09 PROCEDURE — 6370000000 HC RX 637 (ALT 250 FOR IP)

## 2024-02-09 PROCEDURE — 2580000003 HC RX 258: Performed by: HOSPITALIST

## 2024-02-09 PROCEDURE — 80069 RENAL FUNCTION PANEL: CPT

## 2024-02-09 PROCEDURE — 6370000000 HC RX 637 (ALT 250 FOR IP): Performed by: HOSPITALIST

## 2024-02-09 PROCEDURE — 83735 ASSAY OF MAGNESIUM: CPT

## 2024-02-09 PROCEDURE — 36415 COLL VENOUS BLD VENIPUNCTURE: CPT

## 2024-02-09 PROCEDURE — 6360000002 HC RX W HCPCS: Performed by: INTERNAL MEDICINE

## 2024-02-09 RX ORDER — SODIUM CHLORIDE 1 G/1
1 TABLET ORAL 2 TIMES DAILY WITH MEALS
Qty: 90 TABLET | Refills: 3 | Status: SHIPPED | OUTPATIENT
Start: 2024-02-09

## 2024-02-09 RX ORDER — SODIUM CHLORIDE 9 MG/ML
INJECTION, SOLUTION INTRAVENOUS CONTINUOUS
Status: DISCONTINUED | OUTPATIENT
Start: 2024-02-09 | End: 2024-02-09 | Stop reason: HOSPADM

## 2024-02-09 RX ORDER — LISINOPRIL 20 MG/1
20 TABLET ORAL DAILY
Qty: 30 TABLET | Refills: 3 | Status: SHIPPED | OUTPATIENT
Start: 2024-02-10

## 2024-02-09 RX ORDER — AMOXICILLIN AND CLAVULANATE POTASSIUM 875; 125 MG/1; MG/1
1 TABLET, FILM COATED ORAL 2 TIMES DAILY
Qty: 8 TABLET | Refills: 0 | Status: SHIPPED | OUTPATIENT
Start: 2024-02-09 | End: 2024-02-13

## 2024-02-09 RX ORDER — SODIUM CHLORIDE 1 G/1
1 TABLET ORAL 2 TIMES DAILY WITH MEALS
Status: DISCONTINUED | OUTPATIENT
Start: 2024-02-09 | End: 2024-02-09 | Stop reason: HOSPADM

## 2024-02-09 RX ORDER — LISINOPRIL 20 MG/1
20 TABLET ORAL DAILY
Status: DISCONTINUED | OUTPATIENT
Start: 2024-02-10 | End: 2024-02-09 | Stop reason: HOSPADM

## 2024-02-09 RX ORDER — DILTIAZEM HYDROCHLORIDE 60 MG/1
60 TABLET, FILM COATED ORAL 4 TIMES DAILY
Qty: 120 TABLET | Refills: 3 | Status: SHIPPED | OUTPATIENT
Start: 2024-02-09

## 2024-02-09 RX ADMIN — DILTIAZEM HYDROCHLORIDE 60 MG: 30 TABLET, FILM COATED ORAL at 06:23

## 2024-02-09 RX ADMIN — SODIUM CHLORIDE, PRESERVATIVE FREE 10 ML: 5 INJECTION INTRAVENOUS at 09:47

## 2024-02-09 RX ADMIN — APIXABAN 5 MG: 5 TABLET, FILM COATED ORAL at 09:46

## 2024-02-09 RX ADMIN — LISINOPRIL 10 MG: 5 TABLET ORAL at 09:46

## 2024-02-09 RX ADMIN — SODIUM CHLORIDE: 9 INJECTION, SOLUTION INTRAVENOUS at 13:20

## 2024-02-09 RX ADMIN — CEFTRIAXONE SODIUM 1000 MG: 1 INJECTION, POWDER, FOR SOLUTION INTRAMUSCULAR; INTRAVENOUS at 01:00

## 2024-02-09 RX ADMIN — DILTIAZEM HYDROCHLORIDE 60 MG: 30 TABLET, FILM COATED ORAL at 00:54

## 2024-02-09 RX ADMIN — DEXAMETHASONE SODIUM PHOSPHATE 6 MG: 10 INJECTION INTRAMUSCULAR; INTRAVENOUS at 13:20

## 2024-02-09 RX ADMIN — Medication 1 G: at 18:31

## 2024-02-09 RX ADMIN — DILTIAZEM HYDROCHLORIDE 60 MG: 30 TABLET, FILM COATED ORAL at 13:20

## 2024-02-09 RX ADMIN — AZITHROMYCIN MONOHYDRATE 500 MG: 500 INJECTION, POWDER, LYOPHILIZED, FOR SOLUTION INTRAVENOUS at 01:00

## 2024-02-09 RX ADMIN — GUAIFENESIN 600 MG: 600 TABLET, EXTENDED RELEASE ORAL at 09:46

## 2024-02-09 RX ADMIN — Medication 15 G: at 09:46

## 2024-02-09 RX ADMIN — Medication 1 G: at 09:46

## 2024-02-09 NOTE — TELEPHONE ENCOUNTER
Patient being discharged from [facility] with COVID positive and other things. Patient is also being discharged with home health. [facility] called requesting call back to get notification.

## 2024-02-09 NOTE — PROGRESS NOTES
CARDIOLOGY PROGRESS NOTE      Patient Name: Lashon Vanegas  Age: 79 y.o.  Gender:female  :1945  MRN: [id number]    Patient seen and examined. This is a patient with a history of  hypertension, hyperlipidemia who presented with weakness now being followed for new onset atrial fibrillation. Continues to endorse shortness of breath, but improving. Denies chest pain. No longer on supplemental oxygen. No other complaints reported.    Telemetry reviewed, Afib 70s-110s    Pertinent review of systems items noted above, all other systems are negative. Current medications reviewed.    Physical Examination    Allergies   Allergen Reactions    Codeine Other (See Comments)    Morphine      Other reaction(s): Not Reported This Time    Hydrochlorothiazide Rash     Vitals:    24 1139   BP: (!) 155/83   Pulse: 83   Resp: 18   Temp: 97.7 °F (36.5 °C)   SpO2: 97%     Vital signs are stable  No apparent distress.  Heart has a irregularly irregular rhythm.  + murmur  Lungs are diminished  Abdomen is soft, nontender, normal bowel sounds.  Extremities have no edema  Skin is dry and warm.  Normal affect    Labs reviewed:  Recent Results (from the past 12 hour(s))   CBC with Auto Differential    Collection Time: 24  5:52 AM   Result Value Ref Range    WBC 9.3 3.6 - 11.0 K/uL    RBC 3.97 3.80 - 5.20 M/uL    Hemoglobin 11.4 (L) 11.5 - 16.0 g/dL    Hematocrit 33.2 (L) 35.0 - 47.0 %    MCV 83.6 80.0 - 99.0 FL    MCH 28.7 26.0 - 34.0 PG    MCHC 34.3 30.0 - 36.5 g/dL    RDW 14.9 (H) 11.5 - 14.5 %    Platelets 319 150 - 400 K/uL    MPV 11.9 8.9 - 12.9 FL    Nucleated RBCs 0.0 0.0  WBC    nRBC 0.00 0.00 - 0.01 K/uL    Neutrophils % 72 32 - 75 %    Band Neutrophils 4 0 - 6 %    Lymphocytes % 13 12 - 49 %    Monocytes % 10 5 - 13 %    Eosinophils % 0 0 - 7 %    Basophils % 1 0 - 1 %    Immature Granulocytes 0 %    Neutrophils Absolute 7.1 1.8 - 8.0 K/UL    Lymphocytes Absolute 1.2 0.8 - 3.5 K/UL    Monocytes Absolute 0.9  0.0 - 1.0 K/UL    Eosinophils Absolute 0.0 0.0 - 0.4 K/UL    Basophils Absolute 0.1 0.0 - 0.1 K/UL    Absolute Immature Granulocyte 0.0 K/UL    Differential Type Manual      Platelet Comment Large Platelets      RBC Comment Normocytic, Normochromic     Renal Function Panel    Collection Time: 02/09/24  5:52 AM   Result Value Ref Range    Sodium 130 (L) 136 - 145 mmol/L    Potassium 4.1 3.5 - 5.1 mmol/L    Chloride 101 97 - 108 mmol/L    CO2 24 21 - 32 mmol/L    Anion Gap 5 5 - 15 mmol/L    Glucose 132 (H) 65 - 100 mg/dL    BUN 36 (H) 6 - 20 mg/dL    Creatinine 0.67 0.55 - 1.02 mg/dL    Bun/Cre Ratio 54 (H) 12 - 20      Est, Glom Filt Rate >60 >60 ml/min/1.73m2    Calcium 8.3 (L) 8.5 - 10.1 mg/dL    Phosphorus 3.2 2.6 - 4.7 mg/dL    Albumin 2.7 (L) 3.5 - 5.0 g/dL   Magnesium    Collection Time: 02/09/24  5:52 AM   Result Value Ref Range    Magnesium 2.2 1.6 - 2.4 mg/dL        Case discussed with Dr. Moreira and our impression and recommendations are as follows:  New onset atrial fibrillation   Remains in Afib, rates 50s-110s  Echo this admission with EF 65-70%, NWM, moderate AR/MR/TR. Will need serial echos to reassess as outpatient   CHADSVASC at least 4-5, continue eliquis  Continue to treat cough, covid  Tolerating increased dose of Diltiazem to 60mg q6  She will need a monitor, sleep study, and EP evaluation as OP  Hypertension, difficult to control per patient, nephrology made meds adjustments, BP stable today   Hyperlipidemia, statin as able  Hyponatremia, defer management to primary/ nephrology   Covid, supportive care per primary     Stable from cardiac standpoint. Will follow peripherally.   Will need outpatient follow-up within two weeks of discharge.     Please do not hesitate to call if additional questions arise.    GREY PEREZ, APRN - NP  2/9/2024

## 2024-02-09 NOTE — PROGRESS NOTES
Nutrition Assessment     Type and Reason for Visit: RD Nutrition Re-Screen/LOS    Nutrition Recommendations/Plan:   Continue current diet  Monitor and record PO intakes and Bms in I/Os     Malnutrition Assessment:  Malnutrition Status: No malnutrition    Nutrition Assessment:  Admitted for hyponatremia. Covid+. Screened for LOS. Appetite/intakes good, >76%, no recent wt loss per EMR hx. Labs: Na 130, K 4.1, BUN 36, Creat 0.67, Gluc 132. Meds: urea, 0.9%NaCl    Nutrition Related Findings:   NFPE deferred r/t droplet iso. No n/v, d/c, or problems chewing/swallowing. No edema. BM 2/8. Wound Type: None    Current Nutrition Therapies:    ADULT DIET; Regular; Low Fat/Low Chol/High Fiber/CHANTAL; 1200 ml    Anthropometric Measures:  Height: 162.6 cm (5' 4.02\")  Current Body Wt: 65.8 kg (145 lb 1 oz)   BMI: 24.9    Nutrition Diagnosis:   No nutrition diagnosis at this time     Nutrition Interventions:   Food and/or Nutrient Delivery: Continue Current Diet  Nutrition Education/Counseling: No recommendation at this time  Coordination of Nutrition Care: Continue to monitor while inpatient    Nutrition Monitoring and Evaluation:   Behavioral-Environmental Outcomes: None Identified  Food/Nutrient Intake Outcomes: Enteral Nutrition Intake/Tolerance  Physical Signs/Symptoms Outcomes: Meal Time Behavior, Weight    Discharge Planning:    Too soon to determine     Charlette Washington RD  Contact: [phone]

## 2024-02-09 NOTE — PROGRESS NOTES
sodium chloride flush 0.9 % injection 5-40 mL  5-40 mL IntraVENous PRN    0.9 % sodium chloride infusion   IntraVENous PRN    acetaminophen (TYLENOL) tablet 650 mg  650 mg Oral Q6H PRN    Or    acetaminophen (TYLENOL) suppository 650 mg  650 mg Rectal Q6H PRN    dexAMETHasone (PF) (DECADRON) injection 6 mg  6 mg IntraVENous Q24H    senna (SENOKOT) tablet 8.6 mg  1 tablet Oral BID PRN        Vitals:    02/09/24 0051 02/09/24 0251 02/09/24 0745 02/09/24 1139   BP: (!) 152/94 (!) 141/90 (!) 149/80 (!) 155/83   Pulse: 80 54 80 83   Resp: 18 18  18   Temp: 98.2 °F (36.8 °C) 97.9 °F (36.6 °C) 98.1 °F (36.7 °C) 97.7 °F (36.5 °C)   TempSrc: Oral Oral  Oral   SpO2: 96% 96% 97% 97%   Weight:       Height:         Objective:   General: alert awake well-oriented, no acute distress.  HEENT: EOMI, no Icterus, no Pallor,  mucosa moist.  Neck: Neck is supple, No JVD  Lungs: breathsounds normal, no respiratory distress on inspection,    CVS: heart sounds normal, regular rate and rhythm, no murmurs, no rubs.   GI: soft, nontender, normal BS.  Extremeties: no cyanosis, no edema,   Neuro: Alert, awake, oriented x3,  moving all extremeties well.   Skin: normal skin turgor, no skin rashes.        Intake and Output:  Current Shift: No intake/output data recorded.  Last three shifts: 02/07 1901 - 02/09 0700  In: 870 [P.O.:870]  Out: 1450 [Urine:1450]      Lab/Data Review:  Recent Labs     02/07/24  0552 02/08/24  0306 02/09/24  0552   WBC 9.0 8.2 9.3   HGB 11.3* 11.1* 11.4*   HCT 32.4* 32.4* 33.2*    292 319       Recent Labs     02/07/24  0552 02/08/24  0306 02/09/24  0552   * 132* 130*   K 4.1 3.9 4.1    103 101   CO2 21 23 24   GLUCOSE 131* 136* 132*   BUN 15 19 36*   CREATININE 0.58 0.62 0.67   CALCIUM 8.2* 8.1* 8.3*   MG  --  2.1 2.2   PHOS 2.5* 3.4 3.2   LABALBU 2.5* 2.6* 2.7*     No results for input(s): \"PHART\", \"RNY3POZ\", \"PO2ART\", \"YMS9FHL\", \"BEART\", \"PRG8VKOG\", \"HGBART\", \"CO8ZRGNLR\", \"FIO2A\", \"F5KKOVPZ\",  \"OXYHEM\", \"CARBOXHGBART\", \"METHGBART\", \"G6AFFDQPV\", \"PHCORART\", \"TEMP\" in the last 72 hours.    Invalid input(s): \"AZH4YAKZW\"    Recent Results (from the past 24 hour(s))   CBC with Auto Differential    Collection Time: 02/09/24  5:52 AM   Result Value Ref Range    WBC 9.3 3.6 - 11.0 K/uL    RBC 3.97 3.80 - 5.20 M/uL    Hemoglobin 11.4 (L) 11.5 - 16.0 g/dL    Hematocrit 33.2 (L) 35.0 - 47.0 %    MCV 83.6 80.0 - 99.0 FL    MCH 28.7 26.0 - 34.0 PG    MCHC 34.3 30.0 - 36.5 g/dL    RDW 14.9 (H) 11.5 - 14.5 %    Platelets 319 150 - 400 K/uL    MPV 11.9 8.9 - 12.9 FL    Nucleated RBCs 0.0 0.0  WBC    nRBC 0.00 0.00 - 0.01 K/uL    Neutrophils % 72 32 - 75 %    Band Neutrophils 4 0 - 6 %    Lymphocytes % 13 12 - 49 %    Monocytes % 10 5 - 13 %    Eosinophils % 0 0 - 7 %    Basophils % 1 0 - 1 %    Immature Granulocytes 0 %    Neutrophils Absolute 7.1 1.8 - 8.0 K/UL    Lymphocytes Absolute 1.2 0.8 - 3.5 K/UL    Monocytes Absolute 0.9 0.0 - 1.0 K/UL    Eosinophils Absolute 0.0 0.0 - 0.4 K/UL    Basophils Absolute 0.1 0.0 - 0.1 K/UL    Absolute Immature Granulocyte 0.0 K/UL    Differential Type Manual      Platelet Comment Large Platelets      RBC Comment Normocytic, Normochromic     Renal Function Panel    Collection Time: 02/09/24  5:52 AM   Result Value Ref Range    Sodium 130 (L) 136 - 145 mmol/L    Potassium 4.1 3.5 - 5.1 mmol/L    Chloride 101 97 - 108 mmol/L    CO2 24 21 - 32 mmol/L    Anion Gap 5 5 - 15 mmol/L    Glucose 132 (H) 65 - 100 mg/dL    BUN 36 (H) 6 - 20 mg/dL    Creatinine 0.67 0.55 - 1.02 mg/dL    Bun/Cre Ratio 54 (H) 12 - 20      Est, Glom Filt Rate >60 >60 ml/min/1.73m2    Calcium 8.3 (L) 8.5 - 10.1 mg/dL    Phosphorus 3.2 2.6 - 4.7 mg/dL    Albumin 2.7 (L) 3.5 - 5.0 g/dL   Magnesium    Collection Time: 02/09/24  5:52 AM   Result Value Ref Range    Magnesium 2.2 1.6 - 2.4 mg/dL        Assessment and Plan:     Hyponatremia:    Likely hypovolemic hypoosmolar hyponatremia.  Urine sodium was low  No

## 2024-02-09 NOTE — PROGRESS NOTES
CM reviewed medical record. Patient lives at home alone, but due to recent illness, patient will require assistance at home for a short time. [facility] services have accepted and will see after discharge. Patient now on room air. Na 130 today. Per IDRs, patient will likely discharge in 24 hours.

## 2024-02-09 NOTE — DISCHARGE SUMMARY
Lashon LU [facility] 1945 [id number]  PCP:  Braxton Noland APRN - ANG    Admit date: 2/3/2024  Admitting Physician: Rafael Ferrera MD    Discharge date: 2/9/2024 Discharge Physician: Raj Cardoza MD      Reason for admission:   Chief Complaint   Patient presents with    Shortness of Breath    Fatigue    Positive For Covid-19     Present on Admission:   Hyponatremia       Discharge Diagnoses & Hospital Course::     New onset atrial fibrillation  Left lobe bacterial pneumonia  Hyponatremia  COVID-19  Hypertension    79-year-old female presented with generalized weakness, fatigue, coughing and shortness of breath, diarrhea, nausea with poor oral intake.  She was found to be severely hyponatremic with sodium of 116.  She was also noted to have COVID-19 pneumonia.  She responded to IV fluid resuscitation.  Sodium level improved up to the 130s.  Patient initiated on fluid restriction and discharged home on sodium chloride tablets.  She received Decadron and completed Paxlovid for COVID-19.  Her mentation improved back to baseline.  She also had atrial fibrillation on presentation which was rate controlled with diltiazem.  Stroke prophylaxis with Eliquis.  Patient is stable and is being discharged home.    Patient was feeling and looking better on the day of discharge.  Patient was discharged in a stable condition with following medications and instructions.        Exam:   Wt Readings from Last 3 Encounters:   02/06/24 65.8 kg (145 lb 1 oz)   07/20/23 63 kg (139 lb)   01/12/23 64 kg (141 lb)       Blood pressure (!) 155/83, pulse 83, temperature 97.7 °F (36.5 °C), temperature source Oral, resp. rate 18, height 1.626 m (5' 4.02\"), weight 65.8 kg (145 lb 1 oz), SpO2 97 %.    General: Patient is awake, alert, oriented with appropriate mood and affect  HEENT: Atraumatic, normocephalic, pupils equal equally reactive to light.  Mucous membranes moist  Heart: First and second heart sounds present  Lungs: Air entry    Medication List        START taking these medications      amoxicillin-clavulanate 875-125 MG per tablet  Commonly known as: AUGMENTIN  Take 1 tablet by mouth 2 times daily for 4 days     dilTIAZem 60 MG tablet  Commonly known as: CARDIZEM  Take 1 tablet by mouth 4 times daily     lisinopril 20 MG tablet  Commonly known as: PRINIVIL;ZESTRIL  Take 1 tablet by mouth daily  Start taking on: February 10, 2024     sodium chloride 1 g tablet  Take 1 tablet by mouth 2 times daily (with meals)     urea 15 g Pack packet  Commonly known as: URE-NA  Take 15 g by mouth in the morning and at bedtime for 1 dose            CONTINUE taking these medications      APPLE CIDER VINEGAR PO     Astaxanthin 4 MG Caps     CALCIUM-VITAMIN D3 PO     diclofenac 75 MG EC tablet  Commonly known as: VOLTAREN  TAKE ONE TABLET BY MOUTH TWICE A DAY AS NEEDED FOR PAIN     diclofenac sodium 1 % Gel  Commonly known as: VOLTAREN     fish oil 1000 MG capsule     furosemide 40 MG tablet  Commonly known as: LASIX     metoprolol succinate 100 MG extended release tablet  Commonly known as: TOPROL XL     pravastatin 20 MG tablet  Commonly known as: PRAVACHOL     prednisoLONE acetate 1 % ophthalmic suspension  Commonly known as: PRED FORTE     spironolactone 25 MG tablet  Commonly known as: ALDACTONE     tretinoin 0.025 % cream  Commonly known as: RETIN-A     TURMERIC PO     YUMVS BEET ROOT-TART CHERRY PO            STOP taking these medications      amLODIPine-benazepril 10-40 MG per capsule  Commonly known as: LOTREL               Where to Get Your Medications        These medications were sent to [id number] - [place], [place] - [address] - P [phone] - F [phone]  [facility] [facility] [address]      Phone: [phone]   amoxicillin-clavulanate 875-125 MG per tablet  dilTIAZem 60 MG tablet  lisinopril 20 MG tablet  sodium chloride 1 g tablet  urea 15 g Pack packet          Code Status: Full

## 2024-02-09 NOTE — PROGRESS NOTES
Pulmonary and Critical Care progress note    Subjective:     Chief Complaint:   Chief Complaint   Patient presents with    Shortness of Breath    Fatigue    Positive For Covid-19      Patient seen and examined in her room on the floor this afternoon, no acute events overnight.  Has been weaned to room air.  CBC stable, sodium level coming down slightly to 130 from 132 yesterday.  Continue on oral urea, IV fluids have been stopped.  No changes to steroids/antibiotics at this time.    Essentially, from a pulmonary standpoint, the patient is cleared for discharge.          Review of Systems:  Pertinent items are noted in HPI.                Current Facility-Administered Medications   Medication Dose Route Frequency Provider Last Rate Last Admin    sodium chloride tablet 1 g  1 g Oral BID WC Raj Cardoza MD   1 g at 02/09/24 0946    0.9 % sodium chloride infusion   IntraVENous Continuous Lyric Stanley MD 50 mL/hr at 02/09/24 1320 New Bag at 02/09/24 1320    [START ON 2/10/2024] lisinopril (PRINIVIL;ZESTRIL) tablet 20 mg  20 mg Oral Daily Eduin Bridges APRN - NP        urea (URE-NA) packet 15 g  15 g Oral BID Lyric Stanley MD   15 g at 02/09/24 0946    hydrALAZINE (APRESOLINE) injection 10 mg  10 mg IntraVENous Q4H PRN Sravan Nelson MD   10 mg at 02/07/24 0604    hydrOXYzine pamoate (VISTARIL) capsule 25 mg  25 mg Oral TID PRN Raj Cardoza MD   25 mg at 02/07/24 1615    cefTRIAXone (ROCEPHIN) 1,000 mg in sterile water 10 mL IV syringe  1,000 mg IntraVENous Q24H Sravan Nelson MD   1,000 mg at 02/09/24 0100    azithromycin (ZITHROMAX) 500 mg in sodium chloride 0.9 % 250 mL IVPB ([id number])  500 mg IntraVENous Q24H Sravan Nelson MD   Stopped at 02/09/24 0232    baricitinib (OLUMIANT) tablet 4 mg  4 mg Oral Daily Paulo Tubbs MD   4 mg at 02/08/24 0843    dilTIAZem (CARDIZEM) tablet 60 mg  60 mg Oral 4 times per day Eduin Bridges APRN - NP   60 mg at 02/09/24 1320    dextromethorphan (DELSYM)  30 MG/5ML extended release liquid 30 mg  30 mg Oral 2 times per day Paulo Tubbs MD   30 mg at 02/08/24 2139    guaiFENesin (MUCINEX) extended release tablet 600 mg  600 mg Oral BID Paulo Tubbs MD   600 mg at 02/09/24 0946    apixaban (ELIQUIS) tablet 5 mg  5 mg Oral BID Loving[place]Tisha reveles APRN - NP   5 mg at 02/09/24 0946    sodium chloride flush 0.9 % injection 5-40 mL  5-40 mL IntraVENous PRN Rafael Ferrera MD        0.9 % sodium chloride infusion   IntraVENous PRN Rafael Ferrera MD        magnesium sulfate 2000 mg in 50 mL IVPB premix  2,000 mg IntraVENous PRN Rafael Ferrera MD        ondansetron (ZOFRAN-ODT) disintegrating tablet 4 mg  4 mg Oral Q8H PRN Rafael Ferrera MD        Or    ondansetron (ZOFRAN) injection 4 mg  4 mg IntraVENous Q6H PRN Rafael Ferrera MD        polyethylene glycol (GLYCOLAX) packet 17 g  17 g Oral Daily PRN Rafael Ferrera MD   17 g at 02/08/24 0843    sodium chloride flush 0.9 % injection 5-40 mL  5-40 mL IntraVENous 2 times per day Rafael Ferrera MD   10 mL at 02/09/24 0947    sodium chloride flush 0.9 % injection 5-40 mL  5-40 mL IntraVENous PRN Rafael Ferrera MD        0.9 % sodium chloride infusion   IntraVENous PRN Rafael Ferrera MD        acetaminophen (TYLENOL) tablet 650 mg  650 mg Oral Q6H PRN Rafael Ferrera MD        Or    acetaminophen (TYLENOL) suppository 650 mg  650 mg Rectal Q6H PRN Rafael Ferrera MD        dexAMETHasone (PF) (DECADRON) injection 6 mg  6 mg IntraVENous Q24H Rafael Ferrera MD   6 mg at 02/09/24 1320    senna (SENOKOT) tablet 8.6 mg  1 tablet Oral BID PRN Rafael Ferrera MD              Allergies   Allergen Reactions    Codeine Other (See Comments)    Morphine      Other reaction(s): Not Reported This Time    Hydrochlorothiazide Rash           Objective:     Blood pressure (!) 155/83, pulse 83, temperature 97.7 °F (36.5 °C), temperature source Oral, resp. rate 18,    Result Value Ref Range    Sodium 130 (L) 136 - 145 mmol/L    Potassium 4.1 3.5 - 5.1 mmol/L    Chloride 101 97 - 108 mmol/L    CO2 24 21 - 32 mmol/L    Anion Gap 5 5 - 15 mmol/L    Glucose 132 (H) 65 - 100 mg/dL    BUN 36 (H) 6 - 20 mg/dL    Creatinine 0.67 0.55 - 1.02 mg/dL    Bun/Cre Ratio 54 (H) 12 - 20      Est, Glom Filt Rate >60 >60 ml/min/1.73m2    Calcium 8.3 (L) 8.5 - 10.1 mg/dL    Phosphorus 3.2 2.6 - 4.7 mg/dL    Albumin 2.7 (L) 3.5 - 5.0 g/dL   Magnesium    Collection Time: 02/09/24  5:52 AM   Result Value Ref Range    Magnesium 2.2 1.6 - 2.4 mg/dL       XR CHEST 1 VIEW   Final Result   Diffuse left-sided airspace disease. Follow-up to complete resolution in 6/8   weeks.      Vascular duplex lower extremity venous bilateral   Final Result      XR CHEST PORTABLE   Final Result      No acute process on portable chest.               Assessment:     1.  Acute respiratory failure with hypoxia  2.  COVID-19 infection  3.  Severe hyponatremia  4.  Dehydration  5.  Generalized weakness  6.  Possible CHF  7.  Hypertension    Plan:     Gradual improvement in clinical status    Now on room air again  Will use supplemental oxygen as needed to keep saturation above 90%  Recommend walking O2 test prior to discharge    Essentially, from a pulmonary standpoint, the patient is cleared for discharge.    Patient has COVID-19 infection  Already received Paxlovid  Chest x-ray mostly clear  Continue on Decadron 6 mg IV daily for 10 days, inflammatory markers trending down again yesterday.    D-dimer was elevated on admission, but lower extremity venous Dopplers negative for clots  Dimer level improving on 2/6/2024  Baricitinib started on 2/6/2024 for hypoxemia  No role for CTA chest unless hypoxia worsens.    Severe hyponatremia with sodium of 116 on  Possibly related to psychogenic polydipsia or SIADH  Sodium coming down slightly to 130  Potassium/magnesium/phosphorus normal today  Appreciate nephrology input, IV fluids

## 2024-02-11 LAB
BACTERIA SPEC CULT: NORMAL
Lab: NORMAL

## 2024-02-12 ENCOUNTER — TELEPHONE (OUTPATIENT)
Facility: CLINIC | Age: 79
End: 2024-02-12

## 2024-02-12 LAB
BACTERIA SPEC CULT: NORMAL
Lab: NORMAL

## 2024-02-12 NOTE — TELEPHONE ENCOUNTER
Juana from [facility] called on behalf of patient. She stated patient was discharged from the hospital and they are seeing her now for home health. She asked if we wanted to check her sodium or if she can do it, which ever TSS prefers. Any questions or information please call Juana at [phone].

## 2024-02-15 ENCOUNTER — OFFICE VISIT (OUTPATIENT)
Facility: CLINIC | Age: 79
End: 2024-02-15

## 2024-02-15 VITALS
RESPIRATION RATE: 14 BRPM | HEIGHT: 64 IN | SYSTOLIC BLOOD PRESSURE: 130 MMHG | WEIGHT: 131 LBS | HEART RATE: 69 BPM | OXYGEN SATURATION: 99 % | DIASTOLIC BLOOD PRESSURE: 80 MMHG | BODY MASS INDEX: 22.36 KG/M2

## 2024-02-15 DIAGNOSIS — S91.105A OPEN WOUND OF SECOND TOE OF LEFT FOOT, INITIAL ENCOUNTER: ICD-10-CM

## 2024-02-15 DIAGNOSIS — M21.612 BILATERAL BUNIONS: ICD-10-CM

## 2024-02-15 DIAGNOSIS — M21.611 BILATERAL BUNIONS: ICD-10-CM

## 2024-02-15 DIAGNOSIS — E87.1 LOW SODIUM LEVELS: Primary | ICD-10-CM

## 2024-02-15 DIAGNOSIS — E87.1 HYPONATREMIA: Primary | ICD-10-CM

## 2024-02-15 RX ORDER — UREA 15 G
POWDER IN PACKET (EA) ORAL
COMMUNITY
Start: 2024-02-12

## 2024-02-15 RX ORDER — APIXABAN 5 MG/1
TABLET, FILM COATED ORAL
COMMUNITY
Start: 2024-02-12

## 2024-02-15 ASSESSMENT — PATIENT HEALTH QUESTIONNAIRE - PHQ9
SUM OF ALL RESPONSES TO PHQ9 QUESTIONS 1 & 2: 0
SUM OF ALL RESPONSES TO PHQ QUESTIONS 1-9: 0
2. FEELING DOWN, DEPRESSED OR HOPELESS: NOT AT ALL
1. LITTLE INTEREST OR PLEASURE IN DOING THINGS: NOT AT ALL
SUM OF ALL RESPONSES TO PHQ QUESTIONS 1-9: 0

## 2024-02-15 NOTE — PROGRESS NOTES
Subjective    Chief Complaint   Patient presents with    Follow-Up from Hospital     Had covid went home ,could not eat when she got home son called rescue squad and they stated she had pneumonia. They discovered afib saw PT        HPI:    Lashon Vanegas is a 79 y.o. female.  79-year-old female presents for hospital discharge follow-up where she was admitted on 02/03/2024 and discharged 02/09/2024.  It was found that she came in complaining of shortness of breath, fatigue and she tested positive for COVID.  In addition she also was found to be hyponatremic and also had a left lobe bacterial pneumonia and new onset A-fib.  She was initiated on IV fluid resuscitation and her sodium level improved up to 130.  She also received Decadron and completed Paxlovid for her COVID-19 and her mentation improved back to baseline at that time as previously mentioned she also had a new presentation of A-fib and the rate was controlled with diltiazem and she is on stroke prophylaxis with Eliquis.  Patient saw [facility] cardiology yesterday and was placed on a Holter monitor that she will be wearing for the next 2 weeks.  She is also scheduled for an appointment with the electrophysiologist on 03/29/2024.  She also has a current complaint of bilateral bunions and a scabbed wound on the second toe of her left foot.  It rubs the top of her shoe because the toe does not completely lie that she has note flat.        Current Outpatient Medications on File Prior to Visit   Medication Sig Dispense Refill    ELIQUIS 5 MG TABS tablet       UREAPRO POWD TAKE 15 GRAMS BY MOUTH ONCE IN THE MORNING AND ONCE AT BEDTIME FOR 1 DOSE      lisinopril (PRINIVIL;ZESTRIL) 20 MG tablet Take 1 tablet by mouth daily 30 tablet 3    dilTIAZem (CARDIZEM) 60 MG tablet Take 1 tablet by mouth 4 times daily 120 tablet 3    sodium chloride 1 g tablet Take 1 tablet by mouth 2 times daily (with meals) 90 tablet 3    diclofenac (VOLTAREN) 75 MG EC tablet TAKE ONE

## 2024-02-15 NOTE — PROGRESS NOTES
Chief Complaint   Patient presents with    Follow-Up from Hospital     Had covid went home ,could not eat when she got home son called rescue squad and they stated she had pneumonia. They discovered afib saw PT      PHQ-9 Total Score: 0 (2/15/2024  2:20 PM)    \"Have you been to the ER, urgent care clinic since your last visit?  Hospitalized since your last visit?\"    NO    “Have you seen or consulted any other health care providers outside of [facility] since your last visit?”    NO              Pulse 69   Ht 1.626 m (5' 4.02\")   Wt 59.4 kg (131 lb)   SpO2 99%   BMI 22.47 kg/m²       7/20/2023     7:56 AM   Amb Fall Risk Assessment and TUG Test   Do you feel unsteady or are you worried about falling?  no   2 or more falls in past year? no   Fall with injury in past year? no

## 2024-02-16 LAB
ALBUMIN SERPL-MCNC: 4.2 G/DL (ref 3.8–4.8)
ALBUMIN/GLOB SERPL: 2.2 {RATIO} (ref 1.2–2.2)
ALP SERPL-CCNC: 76 IU/L (ref 44–121)
ALT SERPL-CCNC: 38 IU/L (ref 0–32)
AST SERPL-CCNC: 14 IU/L (ref 0–40)
BILIRUB SERPL-MCNC: 0.3 MG/DL (ref 0–1.2)
BUN SERPL-MCNC: 65 MG/DL (ref 8–27)
BUN/CREAT SERPL: 58 (ref 12–28)
CALCIUM SERPL-MCNC: 9.4 MG/DL (ref 8.7–10.3)
CHLORIDE SERPL-SCNC: 96 MMOL/L (ref 96–106)
CO2 SERPL-SCNC: 25 MMOL/L (ref 20–29)
CREAT SERPL-MCNC: 1.12 MG/DL (ref 0.57–1)
EGFRCR SERPLBLD CKD-EPI 2021: 50 ML/MIN/1.73
GLOBULIN SER CALC-MCNC: 1.9 G/DL (ref 1.5–4.5)
GLUCOSE SERPL-MCNC: 104 MG/DL (ref 70–99)
PHOSPHATE SERPL-MCNC: 5.5 MG/DL (ref 3–4.3)
POTASSIUM SERPL-SCNC: 4.9 MMOL/L (ref 3.5–5.2)
PROT SERPL-MCNC: 6.1 G/DL (ref 6–8.5)
SODIUM SERPL-SCNC: 134 MMOL/L (ref 134–144)

## 2024-02-26 ENCOUNTER — TELEPHONE (OUTPATIENT)
Facility: CLINIC | Age: 79
End: 2024-02-26

## 2024-02-26 DIAGNOSIS — N18.31 STAGE 3A CHRONIC KIDNEY DISEASE (HCC): Primary | ICD-10-CM

## 2024-02-26 DIAGNOSIS — E87.1 HYPONATREMIA: ICD-10-CM

## 2024-02-26 NOTE — TELEPHONE ENCOUNTER
----- Message from Lulú Gamez sent at 2/26/2024  8:39 AM EST -----  Subject: Referral Request    Reason for referral request? Patient states she needs to be contacted and   advised if she needs a Kidney specialist? And what she can do to receive   the handicap . And last pt needs to know if she needs the UREAPRO   POWDER refilled.  Provider patient wants to be referred to(if known):     Provider Phone Number(if known):    Additional Information for Provider?   ---------------------------------------------------------------------------  --------------  CALL BACK INFO    [phone]; OK to leave message on voicemail  ---------------------------------------------------------------------------  --------------

## 2024-02-26 NOTE — TELEPHONE ENCOUNTER
Patient stated she has not seen a Nephrologist, if she needs to see one she will be willing to see one but close to her location of [place]. Patient is taking Sodium pills and the pharmacy would like to know if she should take Urea Powder, she got this in the hospital so wants to know if she would need a refill. For the DMV placard she uses a rollator and walker and when she went to [employer] there was no parking so it was a lot to take that out the car then get to the electric carts then to put the walker back in the car.

## 2024-02-26 NOTE — TELEPHONE ENCOUNTER
Chart review shows that her PCP had recommended she see Nephrology based on her follow-up labs from the hospital. Will place referral to one in [place]. Contact information below.     Yes we can do the DMV form. Does she want to pick it up or we can mail it to her?    Per the hospital discharge records she was just supposed to take the urea powder for one day. They had intended for her to continue the sodium tabs and it looks like her sodium level was improved on her recent labs with these. It looks like she should have plenty of refills on these.     [facility] - Patti Sales MD  [address],   [place] [address] [phone]

## 2024-02-26 NOTE — TELEPHONE ENCOUNTER
Patient stated she would like both of these mailed to her. I have printed out both and informed patient she should only take sodium tablets.

## 2024-03-06 NOTE — PROGRESS NOTES
Physician Progress Note      PATIENT:               DEJAH LEMON  CSN #:                  [id number]  :                       1945  ADMIT DATE:       2/3/2024 10:25 AM  DISCH DATE:        2024 6:48 PM  RESPONDING  PROVIDER #:        Raj Cardoza MD          QUERY TEXT:    Patient admitted with hyponatremia.   Noted documentation of Likely   hypovolemic hypoosmolar hyponatremia in nephrology note dated  and SIADH in   H&P and pulmonology consult notes.  If possible, please document in progress notes and discharge summary if you   are evaluating and /or treating any of the following:    The medical record reflects the following:  Risk Factors: 78 y/o, recent covid infection, PNA,    Clinical Indicators:  * Per Nephrology Note :  1. Hyponatremia: ?  Likely hypovolemic hypoosmolar hyponatremia.  Urine sodium was low  No history of thiazide use  Sodium improved from 122--> 134.  However again decreased to 132-->130 today,   despite urea being started yesterday    * H&P: Either SIADH or psychogenic polydipsia  * Pulmonology: Possibly related to psychogenic polydipsia or SIADH    Treatment: IVF, Nephrology consult, fluid restriction,    Thank you,  Swetha HINOJOSA, RN, CCDS  Please contact me for any questions or concerns regarding this query at   [email or web address]  Options provided:  -- Likely hypovolemic hypoosmolar hyponatremia confirmed and SIADH ruled out  -- SIADH confirmed and likely hypovolemic hypoosmolar hyponatremia ruled out  -- Other - I will add my own diagnosis  -- Disagree - Not applicable / Not valid  -- Disagree - Clinically unable to determine / Unknown  -- Refer to Clinical Documentation Reviewer    PROVIDER RESPONSE TEXT:    After study, SIADH confirmed and likely hypovolemic hypoosmolar hyponatremia   ruled out.    Query created by: Swetha Rausch on 2024 9:03 AM      Electronically signed by:  Raj Cardoza MD 3/6/2024 1:40 PM

## 2024-03-22 NOTE — PROGRESS NOTES
This encounter note is being administratively closed in accordance with the defined policies, procedures, and workflows established by [facility].  I cannot validate the clinical content accuracy of this information. -Chelo Bradford MD

## 2024-06-26 ENCOUNTER — HOSPITAL ENCOUNTER (OUTPATIENT)
Facility: HOSPITAL | Age: 79
Discharge: HOME | End: 2024-06-29
Payer: MEDICARE

## 2024-06-26 DIAGNOSIS — Z12.31 VISIT FOR SCREENING MAMMOGRAM: ICD-10-CM

## 2024-06-26 DIAGNOSIS — R92.8 ABNORMAL MAMMOGRAM: Primary | ICD-10-CM

## 2024-06-26 PROCEDURE — 77067 SCR MAMMO BI INCL CAD: CPT

## 2024-06-27 SDOH — HEALTH STABILITY: PHYSICAL HEALTH: ON AVERAGE, HOW MANY MINUTES DO YOU ENGAGE IN EXERCISE AT THIS LEVEL?: 50 MIN

## 2024-06-27 SDOH — HEALTH STABILITY: PHYSICAL HEALTH: ON AVERAGE, HOW MANY DAYS PER WEEK DO YOU ENGAGE IN MODERATE TO STRENUOUS EXERCISE (LIKE A BRISK WALK)?: 4 DAYS

## 2024-06-27 ASSESSMENT — LIFESTYLE VARIABLES
HOW OFTEN DO YOU HAVE SIX OR MORE DRINKS ON ONE OCCASION: 1
HOW MANY STANDARD DRINKS CONTAINING ALCOHOL DO YOU HAVE ON A TYPICAL DAY: PATIENT DOES NOT DRINK
HOW OFTEN DO YOU HAVE A DRINK CONTAINING ALCOHOL: NEVER
HOW MANY STANDARD DRINKS CONTAINING ALCOHOL DO YOU HAVE ON A TYPICAL DAY: 0
HOW OFTEN DO YOU HAVE A DRINK CONTAINING ALCOHOL: 1

## 2024-06-27 ASSESSMENT — PATIENT HEALTH QUESTIONNAIRE - PHQ9
2. FEELING DOWN, DEPRESSED OR HOPELESS: NOT AT ALL
SUM OF ALL RESPONSES TO PHQ QUESTIONS 1-9: 0
SUM OF ALL RESPONSES TO PHQ QUESTIONS 1-9: 0
1. LITTLE INTEREST OR PLEASURE IN DOING THINGS: NOT AT ALL
SUM OF ALL RESPONSES TO PHQ9 QUESTIONS 1 & 2: 0
SUM OF ALL RESPONSES TO PHQ QUESTIONS 1-9: 0
SUM OF ALL RESPONSES TO PHQ QUESTIONS 1-9: 0

## 2024-06-28 ENCOUNTER — OFFICE VISIT (OUTPATIENT)
Facility: CLINIC | Age: 79
End: 2024-06-28
Payer: MEDICARE

## 2024-06-28 VITALS
SYSTOLIC BLOOD PRESSURE: 124 MMHG | DIASTOLIC BLOOD PRESSURE: 68 MMHG | TEMPERATURE: 96.9 F | OXYGEN SATURATION: 97 % | HEIGHT: 64 IN | HEART RATE: 84 BPM | WEIGHT: 132 LBS | BODY MASS INDEX: 22.53 KG/M2

## 2024-06-28 DIAGNOSIS — M11.20 PSEUDOGOUT: ICD-10-CM

## 2024-06-28 DIAGNOSIS — R73.03 PREDIABETES: ICD-10-CM

## 2024-06-28 DIAGNOSIS — I10 PRIMARY HYPERTENSION: ICD-10-CM

## 2024-06-28 DIAGNOSIS — Z00.00 MEDICARE ANNUAL WELLNESS VISIT, SUBSEQUENT: Primary | ICD-10-CM

## 2024-06-28 DIAGNOSIS — E78.2 MIXED HYPERLIPIDEMIA: ICD-10-CM

## 2024-06-28 DIAGNOSIS — I48.0 PAROXYSMAL ATRIAL FIBRILLATION (HCC): ICD-10-CM

## 2024-06-28 PROBLEM — I48.91 ATRIAL FIBRILLATION (HCC): Status: ACTIVE | Noted: 2024-06-28

## 2024-06-28 PROCEDURE — 99214 OFFICE O/P EST MOD 30 MIN: CPT | Performed by: FAMILY MEDICINE

## 2024-06-28 PROCEDURE — G8399 PT W/DXA RESULTS DOCUMENT: HCPCS | Performed by: FAMILY MEDICINE

## 2024-06-28 PROCEDURE — 1123F ACP DISCUSS/DSCN MKR DOCD: CPT | Performed by: FAMILY MEDICINE

## 2024-06-28 PROCEDURE — G8420 CALC BMI NORM PARAMETERS: HCPCS | Performed by: FAMILY MEDICINE

## 2024-06-28 PROCEDURE — G0439 PPPS, SUBSEQ VISIT: HCPCS | Performed by: FAMILY MEDICINE

## 2024-06-28 PROCEDURE — 1090F PRES/ABSN URINE INCON ASSESS: CPT | Performed by: FAMILY MEDICINE

## 2024-06-28 PROCEDURE — 3074F SYST BP LT 130 MM HG: CPT | Performed by: FAMILY MEDICINE

## 2024-06-28 PROCEDURE — G8427 DOCREV CUR MEDS BY ELIG CLIN: HCPCS | Performed by: FAMILY MEDICINE

## 2024-06-28 PROCEDURE — 3078F DIAST BP <80 MM HG: CPT | Performed by: FAMILY MEDICINE

## 2024-06-28 PROCEDURE — 1036F TOBACCO NON-USER: CPT | Performed by: FAMILY MEDICINE

## 2024-06-28 RX ORDER — APIXABAN 2.5 MG/1
2.5 TABLET, FILM COATED ORAL 2 TIMES DAILY
COMMUNITY
Start: 2024-06-11

## 2024-06-28 RX ORDER — DILTIAZEM HYDROCHLORIDE 240 MG/1
1 TABLET, EXTENDED RELEASE ORAL DAILY
COMMUNITY
Start: 2024-03-27

## 2024-06-28 RX ORDER — VALSARTAN 320 MG/1
320 TABLET ORAL DAILY
COMMUNITY
Start: 2024-05-06

## 2024-06-28 RX ORDER — CARVEDILOL 12.5 MG/1
12.5 TABLET ORAL 2 TIMES DAILY
COMMUNITY
Start: 2024-06-11

## 2024-06-28 NOTE — PROGRESS NOTES
Chief Complaint   Patient presents with    Medicare AWV       \"Have you been to the ER, urgent care clinic since your last visit?  Hospitalized since your last visit?\"    NO    “Have you seen or consulted any other health care providers outside of [facility] since your last visit?”    YES - When: approximately 2 months ago.  Where and Why: Cardiology and Electrophysiologist .            Click Here for Release of Records Request    PHQ-9 Total Score: 0 (6/27/2024  6:11 AM)       Medicare Awv Health Risk Assessment / Depression Screen       Question 6/27/2024  6:11 AM EDT - Filed by Patient    Fall Risk Screening     Do you feel unsteady or are you worried about falling? yes    Have you fallen 2 or more times in the past year?   no    Have you had any fall with injury in the past year?   no    Health Risk Assessment / General     In general, how would you say your health is? Good    In the past 7 days, have you experienced any of the following: New or Increased Pain, New or Increased Fatigue, Loneliness, Social Isolation, Stress or Anger? No    Do you have a Living Will? Yes    Health Habits/ Nutrition     On average, how many days per week do you engage in moderate to strenuous exercise (like a brisk walk)? 4 days    On average, how many minutes do you engage in exercise at this level? 50 min    Do you eat balanced/healthy meals regularly? Yes    Have you seen the dentist within the past year? Yes    Hearing / Vision     Have you had an eye exam within the past year? Yes    Do you have difficulty driving, watching TV, or doing any of your daily activities because of your eyesight? No    Do you or your family notice any trouble with your hearing that hasn't been managed with hearing aids? No    Safety     Do you have working smoke detectors? Yes    Do you have any tripping hazards - loose or unsecured carpets or rugs? No    Do you have non-slip mats or non-slip surfaces or shower bars or grab bars in 
240 MG TB24 Take 1 tablet by mouth daily Yes Provider, MD Joe   ELIQUIS 2.5 MG TABS tablet Take 1 tablet by mouth 2 times daily Yes ProviderJoe MD   diclofenac (VOLTAREN) 75 MG EC tablet TAKE ONE TABLET BY MOUTH TWICE A DAY AS NEEDED FOR PAIN Yes Braxton Noland, APRN - NP   APPLE CIDER VINEGAR PO Take by mouth Yes ProviderJoe MD   Misc Natural Products (YUMVS BEET ROOT-TART CHERRY PO) Take by mouth Yes ProviderJoe MD   Calcium Carbonate-Vitamin D (CALCIUM-VITAMIN D3 PO) Take by mouth Yes Automatic Reconciliation, Ar   TURMERIC PO Take by mouth Yes Automatic Reconciliation, Ar   Astaxanthin 4 MG CAPS Take by mouth Yes Automatic Reconciliation, Ar   diclofenac sodium (VOLTAREN) 1 % GEL Apply 2 g topically 4 times daily Yes Automatic Reconciliation, Ar   Omega-3 Fatty Acids (FISH OIL) 1000 MG capsule Take 2 capsules by mouth 2 times daily Yes Automatic Reconciliation, Ar   pravastatin (PRAVACHOL) 20 MG tablet Take 1 tablet by mouth Yes Automatic Reconciliation, Ar   spironolactone (ALDACTONE) 25 MG tablet Take 1 tablet by mouth daily Yes Automatic Reconciliation, Ar   tretinoin (RETIN-A) 0.025 % cream APPLY A THIN LAYER TO AFFECTED AREA(S) OF THE FACE AS NEEDED Yes Automatic Reconciliation, Ar   UREAPRO POWD TAKE 15 GRAMS BY MOUTH ONCE IN THE MORNING AND ONCE AT BEDTIME FOR 1 DOSE  ProviderJoe MD   lisinopril (PRINIVIL;ZESTRIL) 20 MG tablet Take 1 tablet by mouth daily  Patient not taking: Reported on 6/28/2024  Raj Cardoza MD   sodium chloride 1 g tablet Take 1 tablet by mouth 2 times daily (with meals)  Patient not taking: Reported on 6/28/2024  Raj Cardoza MD   furosemide (LASIX) 40 MG tablet Take 1 tablet by mouth daily  Automatic Reconciliation, Ar   metoprolol succinate (TOPROL XL) 100 MG extended release tablet Take 1 tablet by mouth daily  Patient not taking: Reported on 6/28/2024  Automatic Reconciliation, Ar   prednisoLONE acetate (PRED FORTE) 1 % 
daily      ELIQUIS 2.5 MG TABS tablet Take 1 tablet by mouth 2 times daily      diclofenac (VOLTAREN) 75 MG EC tablet TAKE ONE TABLET BY MOUTH TWICE A DAY AS NEEDED FOR PAIN 180 tablet 1    APPLE CIDER VINEGAR PO Take by mouth      Misc Natural Products (YUMVS BEET ROOT-TART CHERRY PO) Take by mouth      Calcium Carbonate-Vitamin D (CALCIUM-VITAMIN D3 PO) Take by mouth      TURMERIC PO Take by mouth      Astaxanthin 4 MG CAPS Take by mouth      diclofenac sodium (VOLTAREN) 1 % GEL Apply 2 g topically 4 times daily      Omega-3 Fatty Acids (FISH OIL) 1000 MG capsule Take 2 capsules by mouth 2 times daily      pravastatin (PRAVACHOL) 20 MG tablet Take 1 tablet by mouth      spironolactone (ALDACTONE) 25 MG tablet Take 1 tablet by mouth 2 times daily      tretinoin (RETIN-A) 0.025 % cream APPLY A THIN LAYER TO AFFECTED AREA(S) OF THE FACE AS NEEDED       No current facility-administered medications on file prior to visit.

## 2024-06-29 LAB
ALBUMIN SERPL-MCNC: 4.7 G/DL (ref 3.8–4.8)
ALP SERPL-CCNC: 99 IU/L (ref 44–121)
ALT SERPL-CCNC: 24 IU/L (ref 0–32)
AST SERPL-CCNC: 16 IU/L (ref 0–40)
BASOPHILS # BLD AUTO: 0 X10E3/UL (ref 0–0.2)
BASOPHILS NFR BLD AUTO: 1 %
BILIRUB SERPL-MCNC: 0.4 MG/DL (ref 0–1.2)
BUN SERPL-MCNC: 21 MG/DL (ref 8–27)
BUN/CREAT SERPL: 19 (ref 12–28)
CALCIUM SERPL-MCNC: 10.1 MG/DL (ref 8.7–10.3)
CHLORIDE SERPL-SCNC: 94 MMOL/L (ref 96–106)
CHOLEST SERPL-MCNC: 166 MG/DL (ref 100–199)
CO2 SERPL-SCNC: 25 MMOL/L (ref 20–29)
CREAT SERPL-MCNC: 1.11 MG/DL (ref 0.57–1)
EGFRCR SERPLBLD CKD-EPI 2021: 50 ML/MIN/1.73
EOSINOPHIL # BLD AUTO: 0 X10E3/UL (ref 0–0.4)
EOSINOPHIL NFR BLD AUTO: 1 %
ERYTHROCYTE [DISTWIDTH] IN BLOOD BY AUTOMATED COUNT: 13.8 % (ref 11.7–15.4)
GLOBULIN SER CALC-MCNC: 2.3 G/DL (ref 1.5–4.5)
GLUCOSE SERPL-MCNC: 113 MG/DL (ref 70–99)
HBA1C MFR BLD: 6.3 % (ref 4.8–5.6)
HCT VFR BLD AUTO: 40.6 % (ref 34–46.6)
HDLC SERPL-MCNC: 48 MG/DL
HGB BLD-MCNC: 13.2 G/DL (ref 11.1–15.9)
IMM GRANULOCYTES # BLD AUTO: 0 X10E3/UL (ref 0–0.1)
IMM GRANULOCYTES NFR BLD AUTO: 1 %
LDLC SERPL CALC-MCNC: 96 MG/DL (ref 0–99)
LYMPHOCYTES # BLD AUTO: 1.2 X10E3/UL (ref 0.7–3.1)
LYMPHOCYTES NFR BLD AUTO: 20 %
MCH RBC QN AUTO: 29.9 PG (ref 26.6–33)
MCHC RBC AUTO-ENTMCNC: 32.5 G/DL (ref 31.5–35.7)
MCV RBC AUTO: 92 FL (ref 79–97)
MONOCYTES # BLD AUTO: 0.7 X10E3/UL (ref 0.1–0.9)
MONOCYTES NFR BLD AUTO: 12 %
NEUTROPHILS # BLD AUTO: 3.9 X10E3/UL (ref 1.4–7)
NEUTROPHILS NFR BLD AUTO: 65 %
PLATELET # BLD AUTO: 234 X10E3/UL (ref 150–450)
POTASSIUM SERPL-SCNC: 4.8 MMOL/L (ref 3.5–5.2)
PROT SERPL-MCNC: 7 G/DL (ref 6–8.5)
RBC # BLD AUTO: 4.42 X10E6/UL (ref 3.77–5.28)
SODIUM SERPL-SCNC: 131 MMOL/L (ref 134–144)
TRIGL SERPL-MCNC: 124 MG/DL (ref 0–149)
VLDLC SERPL CALC-MCNC: 22 MG/DL (ref 5–40)
WBC # BLD AUTO: 5.9 X10E3/UL (ref 3.4–10.8)

## 2024-07-01 ENCOUNTER — HOSPITAL ENCOUNTER (OUTPATIENT)
Facility: HOSPITAL | Age: 79
Discharge: HOME | End: 2024-07-04
Payer: MEDICARE

## 2024-07-01 DIAGNOSIS — R92.8 ABNORMAL MAMMOGRAM: ICD-10-CM

## 2024-07-01 PROCEDURE — 76642 ULTRASOUND BREAST LIMITED: CPT

## 2024-07-01 PROCEDURE — G0279 TOMOSYNTHESIS, MAMMO: HCPCS

## 2024-07-09 ENCOUNTER — TELEPHONE (OUTPATIENT)
Facility: CLINIC | Age: 79
End: 2024-07-09

## 2024-07-09 NOTE — TELEPHONE ENCOUNTER
Patient advised.  She said that this has only being going on since increasing the medication to twice daily (takes at 6am and 6pm).  She said that she will monitor for the next week and if develops any new or worsening symptoms or if does not notice any improvement, she will contact us to schedule an appointment.

## 2024-07-09 NOTE — TELEPHONE ENCOUNTER
Pt called in regards to a health concern she has. Pt stated she had been dealing with high blood pressure, but her Cardiologist put her on carvedilol and spironolactone. Pt stated that her blood pressure has gone down since starting medication, but she has noticed that she has diarrhea around the same time every morning. Pt stated once it happens in the morning she is normally good for the day. Pt stated she went to the pharmacy and was told there's a chance it could be the carvedilol. Pt then went to her Cardiologist and was told that it isn't her meds causing the diarrhea. Pt stated she does not know what could be causing this. Pt stated over the weekend she only had chicken soup with carrots and celery because she's worried that her diet could be causing it because she was told that it wouldn't be her meds. Pt stated after that her diarrhea was orange. Pt looking for any advice.

## 2024-07-09 NOTE — TELEPHONE ENCOUNTER
If she is having one loose stool per day and then feels fine the rest of the day - no further diarrhea, fevers, abdominal pain, blood in stool, decreased appetite, dizziness, lightheadedness or other new symptoms - she should be ok to watch her symptoms closely for the next week or so and let us know if anything is changing or getting worse. Loose stools is not a common side effect of either of those medications, however every person's body is different and especially at 79 years old she may find that some medication affects her this way and it can take a few weeks to get back to normal. If she is still having symptoms after another week or if it worsens at any point, we would be happy to get her a visit in the office to discuss this further and do an exam.     Thanks!

## 2024-07-12 NOTE — TELEPHONE ENCOUNTER
If she is still just having one loose stool in the morning and thinks it may be related to the new blood pressure medication, I would recommend she hold that medication for a few days and see if the symptoms get better. If they do get better she should call the Cardiologist who prescribed the medication to see what they recommend. If the symptoms don't get better she can call and we can see if we can work her in sometime next week.   If her symptoms worsen over the weekend with more severe diarrhea, abdominal pain, fevers, blood in stool or other new and concerning symptoms, she should go to urgent care or the ER for evaluation.     Thanks!

## 2024-07-12 NOTE — TELEPHONE ENCOUNTER
Patient called in requesting to see Dr. Gross. Patient stated that is stating that she is still having the loose stools.

## 2024-09-23 ENCOUNTER — TELEPHONE (OUTPATIENT)
Facility: CLINIC | Age: 79
End: 2024-09-23

## 2025-04-12 ENCOUNTER — APPOINTMENT (OUTPATIENT)
Facility: HOSPITAL | Age: 80
End: 2025-04-12
Payer: MEDICARE

## 2025-04-12 ENCOUNTER — HOSPITAL ENCOUNTER (EMERGENCY)
Facility: HOSPITAL | Age: 80
Discharge: HOME | End: 2025-04-12
Attending: EMERGENCY MEDICINE
Payer: MEDICARE

## 2025-04-12 VITALS
WEIGHT: 136 LBS | TEMPERATURE: 97.9 F | RESPIRATION RATE: 13 BRPM | DIASTOLIC BLOOD PRESSURE: 63 MMHG | HEART RATE: 74 BPM | HEIGHT: 64 IN | BODY MASS INDEX: 23.22 KG/M2 | OXYGEN SATURATION: 95 % | SYSTOLIC BLOOD PRESSURE: 132 MMHG

## 2025-04-12 DIAGNOSIS — S60.419A ABRASION OF FINGER OF LEFT HAND, INITIAL ENCOUNTER: ICD-10-CM

## 2025-04-12 DIAGNOSIS — S09.90XA INJURY OF HEAD, INITIAL ENCOUNTER: Primary | ICD-10-CM

## 2025-04-12 DIAGNOSIS — S00.83XA TRAUMATIC HEMATOMA OF FOREHEAD, INITIAL ENCOUNTER: ICD-10-CM

## 2025-04-12 DIAGNOSIS — S90.414A ABRASION OF TOE OF RIGHT FOOT, INITIAL ENCOUNTER: ICD-10-CM

## 2025-04-12 DIAGNOSIS — S49.92XA INJURY OF LEFT SHOULDER, INITIAL ENCOUNTER: ICD-10-CM

## 2025-04-12 PROCEDURE — 70486 CT MAXILLOFACIAL W/O DYE: CPT

## 2025-04-12 PROCEDURE — 73030 X-RAY EXAM OF SHOULDER: CPT

## 2025-04-12 PROCEDURE — 70450 CT HEAD/BRAIN W/O DYE: CPT

## 2025-04-12 PROCEDURE — 6370000000 HC RX 637 (ALT 250 FOR IP): Performed by: EMERGENCY MEDICINE

## 2025-04-12 PROCEDURE — 99284 EMERGENCY DEPT VISIT MOD MDM: CPT

## 2025-04-12 RX ORDER — ACETAMINOPHEN 500 MG
500 TABLET ORAL
Status: COMPLETED | OUTPATIENT
Start: 2025-04-12 | End: 2025-04-12

## 2025-04-12 RX ORDER — METHOCARBAMOL 500 MG/1
1000 TABLET, FILM COATED ORAL ONCE
Status: COMPLETED | OUTPATIENT
Start: 2025-04-12 | End: 2025-04-12

## 2025-04-12 RX ADMIN — METHOCARBAMOL 1000 MG: 500 TABLET ORAL at 13:32

## 2025-04-12 RX ADMIN — ACETAMINOPHEN 500 MG: 500 TABLET, FILM COATED ORAL at 13:32

## 2025-04-12 ASSESSMENT — PAIN SCALES - GENERAL
PAINLEVEL_OUTOF10: 6
PAINLEVEL_OUTOF10: 10

## 2025-04-12 ASSESSMENT — PAIN DESCRIPTION - LOCATION
LOCATION: GENERALIZED
LOCATION: GENERALIZED

## 2025-04-12 ASSESSMENT — PAIN DESCRIPTION - DESCRIPTORS: DESCRIPTORS: ACHING

## 2025-04-12 ASSESSMENT — PAIN - FUNCTIONAL ASSESSMENT: PAIN_FUNCTIONAL_ASSESSMENT: 0-10

## 2025-04-12 NOTE — DISCHARGE INSTRUCTIONS
Thank you for choosing our Emergency Department for your care.  It is our privilege to care for you in your time of need.  In the next several days, you may receive a survey via email or mailed to your home about your experience with our team.  We would greatly appreciate you taking a few minutes to complete the survey, as we use this information to learn what we have done well and what we could be doing better. Thank you for trusting us with your care!    Below you will find a list of your tests from today's visit.   Labs and Radiology Studies  No results found for this or any previous visit (from the past 12 hours).  CT Head W/O Contrast  Result Date: 4/12/2025  EXAM: CT HEAD WO CONTRAST ACC#: [id number]  INDICATION: trauma, []  COMPARISON: None. CONTRAST: None. TECHNIQUE: Unenhanced CT of the head was performed using 5 mm images. Brain and bone windows were generated. Coronal and sagittal reformats. CT dose reduction was achieved through use of a standardized protocol tailored for this examination and automatic exposure control for dose modulation.  FINDINGS: The ventricles and sulci are normal in size, shape and configuration. There is mild periventricular and subcortical white matter hypodensity consistent with chronic small vessel ischemic disease. There is no intracranial hemorrhage, extra-axial collection, or mass effect. The basilar cisterns are open. No CT evidence of acute infarct. The bone windows demonstrate no abnormalities. The visualized portions of the paranasal sinuses and mastoid air cells are clear.     No evidence of acute intracranial abnormality. Electronically signed by Kwaku Ruggiero    XR SHOULDER LEFT (MIN 2 VIEWS)  Result Date: 4/12/2025  EXAM: XR SHOULDER LEFT (MIN 2 VIEWS) ACC#: [id number]  INDICATION: trauma, []  COMPARISON: None. . FINDINGS: 4 views of the left shoulder demonstrate no fracture, dislocation or other acute abnormality. There are mild osteoarthritic changes at the

## 2025-04-12 NOTE — ED TRIAGE NOTES
Client reports having forward fall, walking up stairs, hitting head and face. Has laceration to r. Foot.  Denies LOC. Hematoma to r. Side of head. Having pain in l. Shoulder. Client takes Eloquist therapy, due to atrial fib.  Pain 10/10.

## 2025-04-12 NOTE — ED PROVIDER NOTES
[facility] EMERGENCY DEPT  EMERGENCY DEPARTMENT HISTORY AND PHYSICAL EXAM      Date of evaluation: 4/12/2025  Patient Name: Lashon Vanegas  Birthdate 1945  MRN: [id number]  ED Provider: Fern Grace MD   Note Started: 3:57 PM EDT 4/12/25    HISTORY OF PRESENT ILLNESS     Chief Complaint   Patient presents with    Fall       History Provided By: Patient, EMS     HPI: Lashon Vanegas is a 80 y.o. female with a history of atrial fibrillation on Eliquis, osteoarthritis, presenting for left shoulder pain, head injury and toe injury after fall.  Patient states that she was walking on a brick stairs when she stubbed her toe and then fell forward hitting her head and nose.  States she has a cut to her right great toe as well as a hematoma to the head.  Denies any loss of consciousness.  Is taking Eliquis.  States she is also having some left shoulder pain.  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 10/04/2020    Eye problems     Gout     Hx of colonoscopy approx 2000    Hypertension     Mixed hyperlipidemia 10/04/2020    Nausea & vomiting     Osteopenia 10/04/2020    Urinary incontinence     Urinary tract infection, site not specified 07/2012       Past Surgical History:  Past Surgical History:   Procedure Laterality Date    COLONOSCOPY  05/18/2017    repeat 10yrs    CYST REMOVAL  03/15/2022    HYSTERECTOMY (CERVIX STATUS UNKNOWN)  2000    HYSTERECTOMY, VAGINAL  2000    re: menorrhagia plus \"bladder tack\"    JOINT REPLACEMENT  2012    MENISCECTOMY  1972; 1973    twice - right knee    ORTHOPEDIC SURGERY  2000    right rotator cuff repair    TONSILLECTOMY  1954    TOTAL KNEE ARTHROPLASTY      TOTAL

## 2025-04-24 ENCOUNTER — TRANSCRIBE ORDERS (OUTPATIENT)
Facility: HOSPITAL | Age: 80
End: 2025-04-24

## 2025-04-24 DIAGNOSIS — Z12.31 VISIT FOR SCREENING MAMMOGRAM: Primary | ICD-10-CM

## 2025-06-27 ENCOUNTER — HOSPITAL ENCOUNTER (OUTPATIENT)
Facility: HOSPITAL | Age: 80
Discharge: HOME | End: 2025-06-30
Attending: FAMILY MEDICINE
Payer: MEDICARE

## 2025-06-27 DIAGNOSIS — Z12.31 VISIT FOR SCREENING MAMMOGRAM: ICD-10-CM

## 2025-06-27 PROCEDURE — 77063 BREAST TOMOSYNTHESIS BI: CPT

## 2025-07-01 ENCOUNTER — TELEPHONE (OUTPATIENT)
Facility: CLINIC | Age: 80
End: 2025-07-01

## 2025-07-01 SDOH — HEALTH STABILITY: PHYSICAL HEALTH: ON AVERAGE, HOW MANY DAYS PER WEEK DO YOU ENGAGE IN MODERATE TO STRENUOUS EXERCISE (LIKE A BRISK WALK)?: 5 DAYS

## 2025-07-01 ASSESSMENT — PATIENT HEALTH QUESTIONNAIRE - PHQ9
SUM OF ALL RESPONSES TO PHQ QUESTIONS 1-9: 0
2. FEELING DOWN, DEPRESSED OR HOPELESS: NOT AT ALL
1. LITTLE INTEREST OR PLEASURE IN DOING THINGS: NOT AT ALL
SUM OF ALL RESPONSES TO PHQ QUESTIONS 1-9: 0

## 2025-07-01 ASSESSMENT — LIFESTYLE VARIABLES
HOW MANY STANDARD DRINKS CONTAINING ALCOHOL DO YOU HAVE ON A TYPICAL DAY: PATIENT DOES NOT DRINK
HOW OFTEN DO YOU HAVE SIX OR MORE DRINKS ON ONE OCCASION: 1
HOW MANY STANDARD DRINKS CONTAINING ALCOHOL DO YOU HAVE ON A TYPICAL DAY: 0
HOW OFTEN DO YOU HAVE A DRINK CONTAINING ALCOHOL: NEVER
HOW OFTEN DO YOU HAVE A DRINK CONTAINING ALCOHOL: 1

## 2025-07-01 NOTE — TELEPHONE ENCOUNTER
Attempted to contact patient regarding upcoming Medicare wellness appointment and completion of HRA questionnaire. LVM for patient to please return call at [phone].

## 2025-07-02 ENCOUNTER — OFFICE VISIT (OUTPATIENT)
Facility: CLINIC | Age: 80
End: 2025-07-02
Payer: MEDICARE

## 2025-07-02 VITALS
OXYGEN SATURATION: 98 % | BODY MASS INDEX: 22.53 KG/M2 | HEIGHT: 64 IN | DIASTOLIC BLOOD PRESSURE: 60 MMHG | SYSTOLIC BLOOD PRESSURE: 120 MMHG | WEIGHT: 132 LBS | HEART RATE: 80 BPM

## 2025-07-02 DIAGNOSIS — E87.1 HYPONATREMIA: ICD-10-CM

## 2025-07-02 DIAGNOSIS — R73.01 IMPAIRED FASTING GLUCOSE: ICD-10-CM

## 2025-07-02 DIAGNOSIS — E78.2 MIXED HYPERLIPIDEMIA: ICD-10-CM

## 2025-07-02 DIAGNOSIS — E55.9 VITAMIN D DEFICIENCY: ICD-10-CM

## 2025-07-02 DIAGNOSIS — I48.0 PAROXYSMAL ATRIAL FIBRILLATION (HCC): ICD-10-CM

## 2025-07-02 DIAGNOSIS — Z00.00 MEDICARE ANNUAL WELLNESS VISIT, SUBSEQUENT: Primary | ICD-10-CM

## 2025-07-02 PROCEDURE — 1090F PRES/ABSN URINE INCON ASSESS: CPT | Performed by: FAMILY MEDICINE

## 2025-07-02 PROCEDURE — 1126F AMNT PAIN NOTED NONE PRSNT: CPT | Performed by: FAMILY MEDICINE

## 2025-07-02 PROCEDURE — 3074F SYST BP LT 130 MM HG: CPT | Performed by: FAMILY MEDICINE

## 2025-07-02 PROCEDURE — 3078F DIAST BP <80 MM HG: CPT | Performed by: FAMILY MEDICINE

## 2025-07-02 PROCEDURE — 1123F ACP DISCUSS/DSCN MKR DOCD: CPT | Performed by: FAMILY MEDICINE

## 2025-07-02 PROCEDURE — G8399 PT W/DXA RESULTS DOCUMENT: HCPCS | Performed by: FAMILY MEDICINE

## 2025-07-02 PROCEDURE — 1036F TOBACCO NON-USER: CPT | Performed by: FAMILY MEDICINE

## 2025-07-02 PROCEDURE — G8427 DOCREV CUR MEDS BY ELIG CLIN: HCPCS | Performed by: FAMILY MEDICINE

## 2025-07-02 PROCEDURE — 99214 OFFICE O/P EST MOD 30 MIN: CPT | Performed by: FAMILY MEDICINE

## 2025-07-02 PROCEDURE — G0439 PPPS, SUBSEQ VISIT: HCPCS | Performed by: FAMILY MEDICINE

## 2025-07-02 PROCEDURE — G2211 COMPLEX E/M VISIT ADD ON: HCPCS | Performed by: FAMILY MEDICINE

## 2025-07-02 PROCEDURE — 1159F MED LIST DOCD IN RCRD: CPT | Performed by: FAMILY MEDICINE

## 2025-07-02 PROCEDURE — G8420 CALC BMI NORM PARAMETERS: HCPCS | Performed by: FAMILY MEDICINE

## 2025-07-02 RX ORDER — NYSTATIN 100000 [USP'U]/G
POWDER TOPICAL 2 TIMES DAILY
COMMUNITY

## 2025-07-02 RX ORDER — CEPHALEXIN 500 MG/1
500 CAPSULE ORAL
COMMUNITY
Start: 2025-04-17

## 2025-07-02 RX ORDER — APIXABAN 5 MG/1
5 TABLET, FILM COATED ORAL 2 TIMES DAILY
COMMUNITY
Start: 2025-06-26

## 2025-07-02 SDOH — ECONOMIC STABILITY: FOOD INSECURITY: WITHIN THE PAST 12 MONTHS, YOU WORRIED THAT YOUR FOOD WOULD RUN OUT BEFORE YOU GOT MONEY TO BUY MORE.: NEVER TRUE

## 2025-07-02 SDOH — ECONOMIC STABILITY: FOOD INSECURITY: WITHIN THE PAST 12 MONTHS, THE FOOD YOU BOUGHT JUST DIDN'T LAST AND YOU DIDN'T HAVE MONEY TO GET MORE.: NEVER TRUE

## 2025-07-02 NOTE — PROGRESS NOTES
Medicare Annual Wellness Visit    Lashon Vanegas is here for Medicare AWV    Assessment & Plan  Medicare wellness visit:  - Advised caution when walking to prevent falls.  - Emphasized regular exercise.  - Requested living will and power of  for records.    Atrial fibrillation:  - Under care of [facility].  - On Eliquis; continue regimen.  - Discontinued carvedilol.    Hyperlipidemia:  - On pravastatin.  - Order lipid panel.  - Labs through [facility].    Prediabetes:  - Order A1c test.  - Advised low-carb diet and regular physical activity.    Vitamin D deficiency:  - Taking vitamin D and calcium plus D supplements daily.  - Check vitamin D levels; adjust regimen if necessary.    Hyponatremia:  - Consistently low sodium since 2020.  - Low sodium noted since 01/2024 hospitalization.  - Order additional tests to investigate cause.    Medicare annual wellness visit, subsequent  Paroxysmal atrial fibrillation (HCC)  Mixed hyperlipidemia  -     Lipid Panel  -     CBC with Auto Differential  -     Comprehensive Metabolic Panel  Impaired fasting glucose  -     Hemoglobin A1C  Vitamin D deficiency  -     Vitamin D 25 Hydroxy  Hyponatremia  -     SODIUM, URINE, RANDOM; Future  -     SPECIFIC GRAVITY, URINE; Future  -     OSMOLALITY, URINE; Future    Results  Imaging   - Shoulder X-ray: Arthritis.       Return in 1 year (on 7/2/2026) for Medicare AWV.     Subjective     History of Present Illness  80-year-old female presents for wellness visit and follow-up.    Fall on 04/12/2025  - Resulted in bruises, scratched lens, necessitating new glasses  - No significant vision changes  - Uses cane for balance  - Exercises regularly with caution  - Scheduled for Watchman procedure on 07/14/2025  - ER visit post-fall revealed shoulder arthritis  - Head scan normal    History of atrial fibrillation  - Under care of [facility]  - On Eliquis  - Discontinued carvedilol  - Adjusted blood pressure

## 2025-07-02 NOTE — PROGRESS NOTES
The patient, Lashon Vanegas, identity was verified by name and MRN.  Chief Complaint   Patient presents with    Medicare AWV     No LMP recorded. Patient has had a hysterectomy.  /60 (BP Site: Left Upper Arm, Patient Position: Sitting, BP Cuff Size: Medium Adult)   Pulse 80   Ht 1.626 m (5' 4\")   Wt 59.9 kg (132 lb)   SpO2 98%   BMI 22.66 kg/m²       7/1/2025    11:40 AM   Amb Fall Risk Assessment and TUG Test   Do you feel unsteady or are you worried about falling?  yes    2 or more falls in past year? no    Fall with injury in past year? yes        Proxy-reported     PHQ-9 Total Score: (Proxy-Rptd) 0 (7/1/2025 11:40 AM)    \"Have you been to the ER, urgent care clinic since your last visit?  Hospitalized since your last visit?\"    NO    “Have you seen or consulted any other health care providers outside our system since your last visit?”    NO             Social History     Substance and Sexual Activity   Sexual Activity Not Currently    Partners: Male     Medication list reviewed and active medications noted. Patient is taking medications as directed.  See documentation in medication activity.  Allergies: allergy list reviewed, no new allergies added    CARETEAM:      Chaperone: offered, declined  Cornelia Cowart CMA

## 2025-07-03 LAB
25(OH)D3+25(OH)D2 SERPL-MCNC: 77.7 NG/ML (ref 30–100)
ALBUMIN SERPL-MCNC: 4.9 G/DL (ref 3.7–4.7)
ALP SERPL-CCNC: 97 IU/L (ref 44–121)
ALT SERPL-CCNC: 11 IU/L (ref 0–32)
AST SERPL-CCNC: 11 IU/L (ref 0–40)
BASOPHILS # BLD AUTO: 0.1 X10E3/UL (ref 0–0.2)
BASOPHILS NFR BLD AUTO: 1 %
BILIRUB SERPL-MCNC: 0.4 MG/DL (ref 0–1.2)
BUN SERPL-MCNC: 25 MG/DL (ref 8–27)
BUN/CREAT SERPL: 23 (ref 12–28)
CALCIUM SERPL-MCNC: 10.4 MG/DL (ref 8.7–10.3)
CHLORIDE SERPL-SCNC: 94 MMOL/L (ref 96–106)
CHOLEST SERPL-MCNC: 153 MG/DL (ref 100–199)
CO2 SERPL-SCNC: 19 MMOL/L (ref 20–29)
CREAT SERPL-MCNC: 1.08 MG/DL (ref 0.57–1)
EGFRCR SERPLBLD CKD-EPI 2021: 52 ML/MIN/1.73
EOSINOPHIL # BLD AUTO: 0 X10E3/UL (ref 0–0.4)
EOSINOPHIL NFR BLD AUTO: 0 %
ERYTHROCYTE [DISTWIDTH] IN BLOOD BY AUTOMATED COUNT: 12.1 % (ref 11.7–15.4)
GLOBULIN SER CALC-MCNC: 2.7 G/DL (ref 1.5–4.5)
GLUCOSE SERPL-MCNC: 99 MG/DL (ref 70–99)
HCT VFR BLD AUTO: 36.6 % (ref 34–46.6)
HDLC SERPL-MCNC: 48 MG/DL
HGB BLD-MCNC: 12.1 G/DL (ref 11.1–15.9)
IMM GRANULOCYTES # BLD AUTO: 0 X10E3/UL (ref 0–0.1)
IMM GRANULOCYTES NFR BLD AUTO: 0 %
LDLC SERPL CALC-MCNC: 85 MG/DL (ref 0–99)
LYMPHOCYTES # BLD AUTO: 0.9 X10E3/UL (ref 0.7–3.1)
LYMPHOCYTES NFR BLD AUTO: 13 %
MCH RBC QN AUTO: 32.5 PG (ref 26.6–33)
MCHC RBC AUTO-ENTMCNC: 33.1 G/DL (ref 31.5–35.7)
MCV RBC AUTO: 98 FL (ref 79–97)
MONOCYTES # BLD AUTO: 0.6 X10E3/UL (ref 0.1–0.9)
MONOCYTES NFR BLD AUTO: 9 %
NEUTROPHILS # BLD AUTO: 5.3 X10E3/UL (ref 1.4–7)
NEUTROPHILS NFR BLD AUTO: 76 %
PLATELET # BLD AUTO: 208 X10E3/UL (ref 150–450)
POTASSIUM SERPL-SCNC: 4.8 MMOL/L (ref 3.5–5.2)
PROT SERPL-MCNC: 7.6 G/DL (ref 6–8.5)
RBC # BLD AUTO: 3.72 X10E6/UL (ref 3.77–5.28)
SODIUM SERPL-SCNC: 132 MMOL/L (ref 134–144)
SODIUM UR-SCNC: 40 MMOL/L
SP GR UR STRIP: 1.02 (ref 1–1.03)
TRIGL SERPL-MCNC: 110 MG/DL (ref 0–149)
VLDLC SERPL CALC-MCNC: 20 MG/DL (ref 5–40)
WBC # BLD AUTO: 6.9 X10E3/UL (ref 3.4–10.8)

## 2025-07-04 LAB — OSMOLALITY UR: 552 MOSMOL/KG

## 2025-07-08 LAB
EST. AVERAGE GLUCOSE BLD GHB EST-MCNC: 128 MG/DL
HBA1C MFR BLD: 6.1 %HB

## 2025-07-21 ENCOUNTER — TELEPHONE (OUTPATIENT)
Facility: CLINIC | Age: 80
End: 2025-07-21

## 2025-07-21 NOTE — TELEPHONE ENCOUNTER
Patient was in [facility] and needs to be seen and followed for her INR issues and anxiety     Scheduled her for [facility] next available appt on Monday 7.28.25 @ 1pm   She has another appt at [facility] that day at 11:45am in  and is worried she won't make it to this appt at this time?    Please advise patient's concerns

## 2025-07-23 NOTE — TELEPHONE ENCOUNTER
Spoke with pt r/e rescheduling hospital f/u appt. Informed pt that the next soonest appt would not be until 8/4/25. Pt would like to keep 7/28 appt. Informed pt of the 15-min window before it is considered a no show.

## 2025-07-28 ENCOUNTER — OFFICE VISIT (OUTPATIENT)
Facility: CLINIC | Age: 80
End: 2025-07-28
Payer: MEDICARE

## 2025-07-28 VITALS
DIASTOLIC BLOOD PRESSURE: 70 MMHG | WEIGHT: 137 LBS | RESPIRATION RATE: 14 BRPM | SYSTOLIC BLOOD PRESSURE: 140 MMHG | OXYGEN SATURATION: 99 % | HEART RATE: 75 BPM | BODY MASS INDEX: 23.39 KG/M2 | HEIGHT: 64 IN | TEMPERATURE: 97.3 F

## 2025-07-28 DIAGNOSIS — Z09 HOSPITAL DISCHARGE FOLLOW-UP: Primary | ICD-10-CM

## 2025-07-28 DIAGNOSIS — K92.2 UPPER GI BLEED: ICD-10-CM

## 2025-07-28 DIAGNOSIS — E87.1 HYPONATREMIA: ICD-10-CM

## 2025-07-28 DIAGNOSIS — F41.1 GENERALIZED ANXIETY DISORDER: ICD-10-CM

## 2025-07-28 DIAGNOSIS — R55 SYNCOPE, UNSPECIFIED SYNCOPE TYPE: ICD-10-CM

## 2025-07-28 DIAGNOSIS — D62 ANEMIA DUE TO ACUTE BLOOD LOSS: ICD-10-CM

## 2025-07-28 PROCEDURE — 99214 OFFICE O/P EST MOD 30 MIN: CPT | Performed by: FAMILY MEDICINE

## 2025-07-28 PROCEDURE — 3078F DIAST BP <80 MM HG: CPT | Performed by: FAMILY MEDICINE

## 2025-07-28 PROCEDURE — 1123F ACP DISCUSS/DSCN MKR DOCD: CPT | Performed by: FAMILY MEDICINE

## 2025-07-28 PROCEDURE — G8420 CALC BMI NORM PARAMETERS: HCPCS | Performed by: FAMILY MEDICINE

## 2025-07-28 PROCEDURE — 1090F PRES/ABSN URINE INCON ASSESS: CPT | Performed by: FAMILY MEDICINE

## 2025-07-28 PROCEDURE — 3077F SYST BP >= 140 MM HG: CPT | Performed by: FAMILY MEDICINE

## 2025-07-28 PROCEDURE — 1159F MED LIST DOCD IN RCRD: CPT | Performed by: FAMILY MEDICINE

## 2025-07-28 PROCEDURE — 1036F TOBACCO NON-USER: CPT | Performed by: FAMILY MEDICINE

## 2025-07-28 PROCEDURE — G8427 DOCREV CUR MEDS BY ELIG CLIN: HCPCS | Performed by: FAMILY MEDICINE

## 2025-07-28 PROCEDURE — G8399 PT W/DXA RESULTS DOCUMENT: HCPCS | Performed by: FAMILY MEDICINE

## 2025-07-28 PROCEDURE — 1126F AMNT PAIN NOTED NONE PRSNT: CPT | Performed by: FAMILY MEDICINE

## 2025-07-28 RX ORDER — SERTRALINE HYDROCHLORIDE 25 MG/1
25 TABLET, FILM COATED ORAL DAILY
Qty: 90 TABLET | Refills: 1 | Status: SHIPPED | OUTPATIENT
Start: 2025-07-28

## 2025-07-28 RX ORDER — PANTOPRAZOLE SODIUM 40 MG/1
40 TABLET, DELAYED RELEASE ORAL 2 TIMES DAILY
COMMUNITY

## 2025-07-28 RX ORDER — HYDROXYZINE HYDROCHLORIDE 25 MG/1
TABLET, FILM COATED ORAL
COMMUNITY

## 2025-07-28 RX ORDER — DILTIAZEM HYDROCHLORIDE 240 MG/1
240 CAPSULE, EXTENDED RELEASE ORAL DAILY
COMMUNITY

## 2025-07-28 NOTE — PROGRESS NOTES
The patient, Lashon Vanegas, identity was verified by name and MRN.  Chief Complaint   Patient presents with    Follow-Up from Hospital     14th-18th. Suppose to have watchman but was rejected 3 times and in recovery passed out. Hemoglobin was low at 7 . This was the 16th.  Put on hydroxyzine in hospital family wants to know if she should be on setraline wants to know if she should stay on Pantoprazole     No LMP recorded. Patient has had a hysterectomy.  BP (!) 140/70   Pulse 75   Temp 97.3 °F (36.3 °C) (Temporal)   Resp 14   Ht 1.626 m (5' 4\")   Wt 62.1 kg (137 lb)   SpO2 99%   BMI 23.52 kg/m²       7/1/2025    11:40 AM   Amb Fall Risk Assessment and TUG Test   Do you feel unsteady or are you worried about falling?  yes    2 or more falls in past year? no    Fall with injury in past year? yes        Proxy-reported     No data recorded  \"Have you been to the ER, urgent care clinic since your last visit?  Hospitalized since your last visit?\"    YES - When: approximately 2  weeks ago.  Where and Why: Watchman procedure .    “Have you seen or consulted any other health care providers outside our system since your last visit?”    NO             Social History     Substance and Sexual Activity   Sexual Activity Not Currently    Partners: Male     Medication list reviewed and active medications noted. Patient is taking medications as directed.  See documentation in medication activity.  Allergies: allergy list reviewed, no new allergies added        No questionnaires available.                           Anne Marie Lizama LPN      
7.9    Prior to Admission medications    Medication Sig Start Date End Date Taking? Authorizing Provider   pantoprazole (PROTONIX) 40 MG tablet Take 1 tablet by mouth in the morning and at bedtime   Yes Joe Pérez MD   dilTIAZem (DILACOR XR) 240 MG extended release capsule Take 1 capsule by mouth daily   Yes Joe Pérez MD   sertraline (ZOLOFT) 25 MG tablet Take 1 tablet by mouth daily 7/28/25  Yes Chelo Bradford MD   cephALEXin (KEFLEX) 500 MG capsule Take 1 capsule by mouth 4/17/25  Yes Joe Pérez MD   ELIQUIS 5 MG TABS tablet Take 1 tablet by mouth 2 times daily 6/26/25  Yes Joe Pérez MD   nystatin (NYAMYC) 637364 UNIT/GM powder Apply topically 2 times daily Apply topically 4 times daily.   Yes Joe Pérez MD   valsartan (DIOVAN) 320 MG tablet Take 1 tablet by mouth daily 5/6/24  Yes Joe Pérez MD   diclofenac sodium (VOLTAREN) 1 % GEL Apply 2 g topically 4 times daily as needed for Pain 6/28/24  Yes Cynthia Gross MD   Calcium Carbonate-Vitamin D (CALCIUM-VITAMIN D3 PO) Take by mouth   Yes Automatic Reconciliation, Ar   pravastatin (PRAVACHOL) 20 MG tablet Take 1 tablet by mouth   Yes Automatic Reconciliation, Ar   spironolactone (ALDACTONE) 25 MG tablet Take 1 tablet by mouth 2 times daily   Yes Automatic Reconciliation, Ar   tretinoin (RETIN-A) 0.025 % cream APPLY A THIN LAYER TO AFFECTED AREA(S) OF THE FACE AS NEEDED 12/9/21  Yes Automatic Reconciliation, Ar   hydrOXYzine HCl (ATARAX) 25 MG tablet TAKE ONE TABLET BY MOUTH EVERY 6 HOURS AS NEEDED FOR ANXIETY  Patient not taking: Reported on 7/28/2025    Joe Pérez MD       Objective   Vitals:    07/28/25 1304   BP: (!) 140/70   Pulse: 75   Resp: 14   Temp: 97.3 °F (36.3 °C)   SpO2: 99%     Physical Exam  Constitutional:       General: She is not in acute distress.     Appearance: Normal appearance. She is normal weight. She is not ill-appearing.      Comments: Ambulating

## 2025-08-14 ENCOUNTER — LAB (OUTPATIENT)
Facility: CLINIC | Age: 80
End: 2025-08-14

## 2025-08-15 LAB
BASOPHILS # BLD AUTO: 0.1 X10E3/UL (ref 0–0.2)
BASOPHILS NFR BLD AUTO: 1 %
BUN SERPL-MCNC: 28 MG/DL (ref 8–27)
BUN/CREAT SERPL: 23 (ref 12–28)
CALCIUM SERPL-MCNC: 9.4 MG/DL (ref 8.7–10.3)
CHLORIDE SERPL-SCNC: 93 MMOL/L (ref 96–106)
CO2 SERPL-SCNC: 22 MMOL/L (ref 20–29)
CREAT SERPL-MCNC: 1.23 MG/DL (ref 0.57–1)
EGFRCR SERPLBLD CKD-EPI 2021: 44 ML/MIN/1.73
EOSINOPHIL # BLD AUTO: 0.1 X10E3/UL (ref 0–0.4)
EOSINOPHIL NFR BLD AUTO: 1 %
ERYTHROCYTE [DISTWIDTH] IN BLOOD BY AUTOMATED COUNT: 12.1 % (ref 11.7–15.4)
GLUCOSE SERPL-MCNC: 112 MG/DL (ref 70–99)
HCT VFR BLD AUTO: 30.7 % (ref 34–46.6)
HGB BLD-MCNC: 10 G/DL (ref 11.1–15.9)
IMM GRANULOCYTES # BLD AUTO: 0.1 X10E3/UL (ref 0–0.1)
IMM GRANULOCYTES NFR BLD AUTO: 1 %
LYMPHOCYTES # BLD AUTO: 1.1 X10E3/UL (ref 0.7–3.1)
LYMPHOCYTES NFR BLD AUTO: 15 %
MCH RBC QN AUTO: 31.2 PG (ref 26.6–33)
MCHC RBC AUTO-ENTMCNC: 32.6 G/DL (ref 31.5–35.7)
MCV RBC AUTO: 96 FL (ref 79–97)
MONOCYTES # BLD AUTO: 0.8 X10E3/UL (ref 0.1–0.9)
MONOCYTES NFR BLD AUTO: 11 %
NEUTROPHILS # BLD AUTO: 4.9 X10E3/UL (ref 1.4–7)
NEUTROPHILS NFR BLD AUTO: 71 %
PLATELET # BLD AUTO: 211 X10E3/UL (ref 150–450)
POTASSIUM SERPL-SCNC: 5.1 MMOL/L (ref 3.5–5.2)
RBC # BLD AUTO: 3.21 X10E6/UL (ref 3.77–5.28)
SODIUM SERPL-SCNC: 129 MMOL/L (ref 134–144)
TSH SERPL DL<=0.005 MIU/L-ACNC: 2.02 UIU/ML (ref 0.45–4.5)
WBC # BLD AUTO: 6.9 X10E3/UL (ref 3.4–10.8)